# Patient Record
Sex: MALE | Race: WHITE | Employment: OTHER | URBAN - NONMETROPOLITAN AREA
[De-identification: names, ages, dates, MRNs, and addresses within clinical notes are randomized per-mention and may not be internally consistent; named-entity substitution may affect disease eponyms.]

---

## 2017-11-07 ENCOUNTER — HOSPITAL ENCOUNTER (OUTPATIENT)
Age: 77
Discharge: HOME | End: 2017-11-07
Payer: MEDICARE

## 2017-11-07 LAB
ANION GAP SERPL CALCULATED.3IONS-SCNC: 18 MEQ/L (ref 8–16)
BASOPHILS # BLD: 0.4 %
BASOPHILS ABSOLUTE: 0 THOU/MM3 (ref 0–0.1)
BUN BLDV-MCNC: 15 MG/DL (ref 7–22)
CALCIUM SERPL-MCNC: 9.4 MG/DL (ref 8.5–10.5)
CHLORIDE BLD-SCNC: 105 MEQ/L (ref 98–111)
CHOLESTEROL, TOTAL: 162 MG/DL (ref 100–199)
CO2: 22 MEQ/L (ref 23–33)
CREAT SERPL-MCNC: 0.8 MG/DL (ref 0.4–1.2)
EOSINOPHIL # BLD: 1.6 %
EOSINOPHILS ABSOLUTE: 0.1 THOU/MM3 (ref 0–0.4)
GFR SERPL CREATININE-BSD FRML MDRD: > 90 ML/MIN/1.73M2
GLUCOSE BLD-MCNC: 96 MG/DL (ref 70–108)
HCT VFR BLD CALC: 41.5 % (ref 42–52)
HDLC SERPL-MCNC: 53 MG/DL
HEMOGLOBIN: 14.4 GM/DL (ref 14–18)
LDL CHOLESTEROL CALCULATED: 81 MG/DL
LYMPHOCYTES # BLD: 24.4 %
LYMPHOCYTES ABSOLUTE: 1.8 THOU/MM3 (ref 1–4.8)
MACROCYTES: ABNORMAL
MCH RBC QN AUTO: 35.3 PG (ref 27–31)
MCHC RBC AUTO-ENTMCNC: 34.7 GM/DL (ref 33–37)
MCV RBC AUTO: 101.7 FL (ref 80–94)
MONOCYTES # BLD: 9.2 %
MONOCYTES ABSOLUTE: 0.7 THOU/MM3 (ref 0.4–1.3)
NUCLEATED RED BLOOD CELLS: 0 /100 WBC
PDW BLD-RTO: 13.3 % (ref 11.5–14.5)
PLATELET # BLD: 188 THOU/MM3 (ref 130–400)
PMV BLD AUTO: 9 MCM (ref 7.4–10.4)
POTASSIUM SERPL-SCNC: 4.4 MEQ/L (ref 3.5–5.2)
RBC # BLD: 4.08 MILL/MM3 (ref 4.7–6.1)
SEG NEUTROPHILS: 64.4 %
SEGMENTED NEUTROPHILS ABSOLUTE COUNT: 4.8 THOU/MM3 (ref 1.8–7.7)
SODIUM BLD-SCNC: 145 MEQ/L (ref 135–145)
TRIGL SERPL-MCNC: 139 MG/DL (ref 0–199)
WBC # BLD: 7.5 THOU/MM3 (ref 4.8–10.8)

## 2017-11-07 PROCEDURE — 80048 BASIC METABOLIC PNL TOTAL CA: CPT

## 2017-11-07 PROCEDURE — 80061 LIPID PANEL: CPT

## 2017-11-07 PROCEDURE — 36415 COLL VENOUS BLD VENIPUNCTURE: CPT

## 2017-11-07 PROCEDURE — 85025 COMPLETE CBC W/AUTO DIFF WBC: CPT

## 2018-03-01 ENCOUNTER — HOSPITAL ENCOUNTER (OUTPATIENT)
Age: 78
Discharge: HOME | End: 2018-03-01
Payer: MEDICARE

## 2018-03-01 LAB
ANION GAP SERPL CALCULATED.3IONS-SCNC: 14 MEQ/L (ref 8–16)
BUN BLDV-MCNC: 18 MG/DL (ref 7–22)
CALCIUM SERPL-MCNC: 10 MG/DL (ref 8.5–10.5)
CHLORIDE BLD-SCNC: 100 MEQ/L (ref 98–111)
CO2: 27 MEQ/L (ref 23–33)
CREAT SERPL-MCNC: 0.8 MG/DL (ref 0.4–1.2)
EKG ATRIAL RATE: 86 BPM
EKG P AXIS: 63 DEGREES
EKG P-R INTERVAL: 164 MS
EKG Q-T INTERVAL: 374 MS
EKG QRS DURATION: 102 MS
EKG QTC CALCULATION (BAZETT): 447 MS
EKG R AXIS: -43 DEGREES
EKG T AXIS: 0 DEGREES
EKG VENTRICULAR RATE: 86 BPM
GFR SERPL CREATININE-BSD FRML MDRD: > 90 ML/MIN/1.73M2
GLUCOSE BLD-MCNC: 89 MG/DL (ref 70–108)
HCT VFR BLD CALC: 43.6 % (ref 42–52)
HEMOGLOBIN: 15.1 GM/DL (ref 14–18)
MCH RBC QN AUTO: 35.3 PG (ref 27–31)
MCHC RBC AUTO-ENTMCNC: 34.6 GM/DL (ref 33–37)
MCV RBC AUTO: 102 FL (ref 80–94)
PDW BLD-RTO: 12.9 % (ref 11.5–14.5)
PLATELET # BLD: 193 THOU/MM3 (ref 130–400)
PMV BLD AUTO: 8.6 FL (ref 7.4–10.4)
POTASSIUM SERPL-SCNC: 4.8 MEQ/L (ref 3.5–5.2)
RBC # BLD: 4.28 MILL/MM3 (ref 4.7–6.1)
SODIUM BLD-SCNC: 141 MEQ/L (ref 135–145)
WBC # BLD: 9.3 THOU/MM3 (ref 4.8–10.8)

## 2018-03-01 PROCEDURE — 36415 COLL VENOUS BLD VENIPUNCTURE: CPT

## 2018-03-01 PROCEDURE — 80048 BASIC METABOLIC PNL TOTAL CA: CPT

## 2018-03-01 PROCEDURE — 85027 COMPLETE CBC AUTOMATED: CPT

## 2018-03-01 PROCEDURE — 93010 ELECTROCARDIOGRAM REPORT: CPT | Performed by: NUCLEAR MEDICINE

## 2018-03-01 PROCEDURE — 93005 ELECTROCARDIOGRAM TRACING: CPT | Performed by: PHYSICIAN ASSISTANT

## 2018-03-08 RX ORDER — AMLODIPINE BESYLATE 5 MG/1
5 TABLET ORAL DAILY
COMMUNITY
End: 2022-02-01 | Stop reason: SDUPTHER

## 2018-03-08 RX ORDER — LOSARTAN POTASSIUM 100 MG/1
100 TABLET ORAL DAILY
COMMUNITY
End: 2022-02-01 | Stop reason: SDUPTHER

## 2018-03-08 NOTE — PROGRESS NOTES
In preparation for their surgical procedure above patient was screened for Obstructive Sleep Apnea (JOHN) using the STOP-Bang Questionnaire by the Pre-Admission Testing department. This is a pre-surgical screening tool for patient safety and serves as a recommendation, this WILL NOT cause cancellation of surgery. STOP-Bang Questionnaire  * Do you currently see a pulmonologist?  No     If yes STOP, do not complete. Patient follows with Dr. Magaly Perez (Optional):[id number]:  1. Do you snore loudly (able to be heard in the next room)? No    2. Do you often feel tired or sleepy during the daytime? No       3. Has anyone ever told you that you stop breathing during your sleep? No    4. Do you have or are you being treated for high blood pressure? Yes      5. BMI more than 35? BMI (Calculated): 26.9        No    6. Age over 48 years? 65 y.o. Yes    7. Neck Circumference greater than 17 inches for male or 16 inches for female? Measured           (visits only)            Not Applicable    8. Gender Male? Yes      TOTAL SCORE: 3    JOHN - Low Risk : Yes to 0 - 2 questions  JOHN - Intermediate Risk : Yes to 3 - 4 questions  JOHN - High Risk : Yes to 5 - 8 questions    Adapted from:   STOP Questionnaire: A Tool to Screen Patients for Obstructive Sleep Apnea   YINA Fink.C.P.C., Anthony Pitt M.B.B.S., Emil Dancer, M.D., Martha Daly. Teresita Saucedo, Ph.D., Windy Merino M.B.B.S., Garry Fitzgerald, M.Sc., Best Mccabe M.D., Christina Yañez. REUBEN McculloughC.P.C.    Anesthesiology 2008; 190:660-55 Copyright 2008, the [address] [employer], [address].   ----------------------------------------------------------------------------------------------------------------

## 2018-03-08 NOTE — PROGRESS NOTES
Patient instructed not to eat or drink anything after midnight the day before surgery. Take heart & blood pressure medications in the morning with a small sip of water. Please bring list of medications with the dosages & when you take them. If you do not  have a list bring the medications bottles with you. If having a MAC or general anesthetic you MUST have a . Bring photo ID & insurance information. Leave jewelry (watch ,rings, peircings) & other valuables, including extra cash, at home. Wear comfortable clean clothing. When showering or bathing the night before or morning of surgery please use  antibacterial soap.

## 2018-03-16 ENCOUNTER — ANESTHESIA EVENT (OUTPATIENT)
Dept: OPERATING ROOM | Age: 78
End: 2018-03-16
Payer: MEDICARE

## 2018-03-16 ENCOUNTER — ANESTHESIA (OUTPATIENT)
Dept: OPERATING ROOM | Age: 78
End: 2018-03-16
Payer: MEDICARE

## 2018-03-16 ENCOUNTER — HOSPITAL ENCOUNTER (OUTPATIENT)
Age: 78
Setting detail: OUTPATIENT SURGERY
Discharge: HOME | End: 2018-03-16
Attending: SPECIALIST | Admitting: SPECIALIST
Payer: MEDICARE

## 2018-03-16 VITALS
DIASTOLIC BLOOD PRESSURE: 69 MMHG | OXYGEN SATURATION: 97 % | RESPIRATION RATE: 16 BRPM | HEIGHT: 70 IN | HEART RATE: 60 BPM | TEMPERATURE: 97.6 F | BODY MASS INDEX: 28.09 KG/M2 | WEIGHT: 196.2 LBS | SYSTOLIC BLOOD PRESSURE: 148 MMHG

## 2018-03-16 VITALS
DIASTOLIC BLOOD PRESSURE: 87 MMHG | SYSTOLIC BLOOD PRESSURE: 138 MMHG | OXYGEN SATURATION: 99 % | RESPIRATION RATE: 3 BRPM

## 2018-03-16 PROCEDURE — 7100000000 HC PACU RECOVERY - FIRST 15 MIN: Performed by: SPECIALIST

## 2018-03-16 PROCEDURE — 6360000002 HC RX W HCPCS: Performed by: SPECIALIST

## 2018-03-16 PROCEDURE — 6360000002 HC RX W HCPCS

## 2018-03-16 PROCEDURE — 7100000011 HC PHASE II RECOVERY - ADDTL 15 MIN: Performed by: SPECIALIST

## 2018-03-16 PROCEDURE — 3700000000 HC ANESTHESIA ATTENDED CARE: Performed by: SPECIALIST

## 2018-03-16 PROCEDURE — 3700000001 HC ADD 15 MINUTES (ANESTHESIA): Performed by: SPECIALIST

## 2018-03-16 PROCEDURE — 6360000002 HC RX W HCPCS: Performed by: ANESTHESIOLOGY

## 2018-03-16 PROCEDURE — 88305 TISSUE EXAM BY PATHOLOGIST: CPT

## 2018-03-16 PROCEDURE — 2580000003 HC RX 258: Performed by: SPECIALIST

## 2018-03-16 PROCEDURE — 3600000012 HC SURGERY LEVEL 2 ADDTL 15MIN: Performed by: SPECIALIST

## 2018-03-16 PROCEDURE — 7100000001 HC PACU RECOVERY - ADDTL 15 MIN: Performed by: SPECIALIST

## 2018-03-16 PROCEDURE — 88311 DECALCIFY TISSUE: CPT

## 2018-03-16 PROCEDURE — 3600000002 HC SURGERY LEVEL 2 BASE: Performed by: SPECIALIST

## 2018-03-16 PROCEDURE — 7100000010 HC PHASE II RECOVERY - FIRST 15 MIN: Performed by: SPECIALIST

## 2018-03-16 RX ORDER — MIDAZOLAM HYDROCHLORIDE 1 MG/ML
INJECTION INTRAMUSCULAR; INTRAVENOUS PRN
Status: DISCONTINUED | OUTPATIENT
Start: 2018-03-16 | End: 2018-03-16 | Stop reason: SDUPTHER

## 2018-03-16 RX ORDER — PROPOFOL 10 MG/ML
INJECTION, EMULSION INTRAVENOUS PRN
Status: DISCONTINUED | OUTPATIENT
Start: 2018-03-16 | End: 2018-03-16 | Stop reason: SDUPTHER

## 2018-03-16 RX ORDER — FENTANYL CITRATE 50 UG/ML
INJECTION, SOLUTION INTRAMUSCULAR; INTRAVENOUS PRN
Status: DISCONTINUED | OUTPATIENT
Start: 2018-03-16 | End: 2018-03-16 | Stop reason: SDUPTHER

## 2018-03-16 RX ORDER — SODIUM CHLORIDE 9 MG/ML
INJECTION, SOLUTION INTRAVENOUS CONTINUOUS
Status: DISCONTINUED | OUTPATIENT
Start: 2018-03-16 | End: 2018-03-16 | Stop reason: HOSPADM

## 2018-03-16 RX ADMIN — CEFAZOLIN SODIUM 1 G: 1 INJECTION, SOLUTION INTRAVENOUS at 11:08

## 2018-03-16 RX ADMIN — PROPOFOL 110 MG: 10 INJECTION, EMULSION INTRAVENOUS at 11:04

## 2018-03-16 RX ADMIN — FENTANYL CITRATE 100 MCG: 50 INJECTION INTRAMUSCULAR; INTRAVENOUS at 11:04

## 2018-03-16 RX ADMIN — MIDAZOLAM HYDROCHLORIDE 2 MG: 1 INJECTION, SOLUTION INTRAMUSCULAR; INTRAVENOUS at 11:04

## 2018-03-16 RX ADMIN — SODIUM CHLORIDE: 9 INJECTION, SOLUTION INTRAVENOUS at 11:02

## 2018-03-16 ASSESSMENT — PULMONARY FUNCTION TESTS
PIF_VALUE: 13
PIF_VALUE: 14
PIF_VALUE: 9
PIF_VALUE: 40
PIF_VALUE: 14
PIF_VALUE: 13
PIF_VALUE: 0
PIF_VALUE: 10
PIF_VALUE: 13
PIF_VALUE: 14
PIF_VALUE: 15
PIF_VALUE: 14
PIF_VALUE: 0
PIF_VALUE: 15
PIF_VALUE: 14
PIF_VALUE: 15
PIF_VALUE: 0
PIF_VALUE: 15
PIF_VALUE: 41
PIF_VALUE: 0
PIF_VALUE: 13
PIF_VALUE: 1
PIF_VALUE: 22
PIF_VALUE: 14
PIF_VALUE: 13
PIF_VALUE: 16
PIF_VALUE: 15
PIF_VALUE: 15
PIF_VALUE: 0
PIF_VALUE: 13
PIF_VALUE: 14
PIF_VALUE: 15
PIF_VALUE: 0
PIF_VALUE: 14
PIF_VALUE: 29
PIF_VALUE: 13
PIF_VALUE: 0
PIF_VALUE: 14
PIF_VALUE: 11
PIF_VALUE: 14
PIF_VALUE: 14
PIF_VALUE: 27
PIF_VALUE: 14
PIF_VALUE: 24
PIF_VALUE: 15
PIF_VALUE: 14
PIF_VALUE: 9
PIF_VALUE: 18
PIF_VALUE: 21
PIF_VALUE: 14
PIF_VALUE: 10
PIF_VALUE: 15
PIF_VALUE: 15
PIF_VALUE: 33

## 2018-03-16 ASSESSMENT — PAIN SCALES - WONG BAKER: WONGBAKER_NUMERICALRESPONSE: 0

## 2018-03-16 ASSESSMENT — PAIN - FUNCTIONAL ASSESSMENT: PAIN_FUNCTIONAL_ASSESSMENT: 0-10

## 2018-03-16 ASSESSMENT — PAIN DESCRIPTION - DESCRIPTORS: DESCRIPTORS: ACHING

## 2018-03-16 NOTE — PROGRESS NOTES
1200-Patient arrived to PACU bay 1 via cart. Report received from [facility] and Dr. Gabriel Mehta. Patient with LMA in place. Vitals obtained and stable with respirations even and unlabored on room air. Dressing in place on scalp with chin wrap. No drainage noted. 1205-Patient opens eyes on command. LMA removed with no issues. Patient verbalized no pain or nausea at this time. Respirations remain even and unlabored on room air. Adriana ALVAREZ to bedside to assess tightness of dressing. Patient denies it being to tight. 1210-Patient awake and alert. Drowsy but answers questions appropriately. 1215-Patient remains awake and alert. Denies needs at this time. No complaints of pain or nausea. 1220-Patient vitals remains stable with respirations even and unlabored on room air. Resting with eyes closed. Does awake to verbal command. 1225-Patient with stable vital signs. Continues to deny pain and nausea at this time. 1230-Patient meets criteria to move to Phase II.   1235-Patient provided with snack and drink. Family brought to bedside. Denies needs at this time. Call light in reach. 1245-Patient resting in bed with family at bedside. Patient denies needs at this time. 1300-Patient meets criteria for discharge. IV removed with no complications. Patient getting dressed with family at bedside. 1315-Discharge instructions reviewed. Verbalized understanding. Patient ambulated to bathroom without difficulty.    1320-Patient discharged in stable condition with family

## 2018-03-16 NOTE — H&P
[address] [address]  History and Physical Update    Pt Name: Delmer Lopez  MRN: [id number]  YOB: 1940  Date of evaluation: 3/16/2018    I have examined the patient and reviewed the H&P/Consult and there are no changes to the patient or plans.       Julee Number  Electronically signed 3/16/2018 at 6:46 AM

## 2018-03-16 NOTE — OP NOTE
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 Lilibeth Pedraza PA-C    Estimated Blood Loss: 5ml     Complications: none immediately appreciated    Procedure: With the patient lying in the supine position and under adequate anesthesia per the anesthesia team.   The positive deep margin periosteum and thin layer of cortical bone was excised (using scalpel and chisel) widely at 4cm and sent to pathology for permanent pathologic evaluation. A Bacitracin adaptic and Xeroform and moist cotton ball tie over bolster was secured using the tails of the silk sutures. Final dressings was a head wrap. Patient is to return to office next week to review pathology report and hopefully his closure will occur next. The patient tolerated the procedure well and remained hemodynamically stable throughout the procedure and was quite comfortable throughout the operative course.     Clinical staging for cancer cases:  Ct  Theresa Villaseñor  Electronically signed by me on 3/16/2018 at 2:14 PM

## 2018-03-16 NOTE — ANESTHESIA PRE PROCEDURE
Encounters:   03/16/18 (!) 171/80       NPO Status: Time of last liquid consumption: 0800 (sip with med)                        Time of last solid consumption: 2330                        Date of last liquid consumption: 03/16/18                        Date of last solid food consumption: 03/15/18    BMI:   Wt Readings from Last 3 Encounters:   03/16/18 196 lb 3.2 oz (89 kg)     Body mass index is 28.15 kg/m². CBC:   Lab Results   Component Value Date    WBC 9.3 03/01/2018    RBC 4.28 03/01/2018    HGB 15.1 03/01/2018    HCT 43.6 03/01/2018    .0 03/01/2018    RDW 12.9 03/01/2018     03/01/2018       CMP:   Lab Results   Component Value Date     03/01/2018    K 4.8 03/01/2018     03/01/2018    CO2 27 03/01/2018    BUN 18 03/01/2018    CREATININE 0.8 03/01/2018    LABGLOM >90 03/01/2018    GLUCOSE 89 03/01/2018    PROT 7.1 09/27/2013    CALCIUM 10.0 03/01/2018    BILITOT 0.7 09/27/2013    ALKPHOS 72 09/27/2013    AST 23 09/27/2013    ALT 13 09/27/2013       POC Tests: No results for input(s): POCGLU, POCNA, POCK, POCCL, POCBUN, POCHEMO, POCHCT in the last 72 hours. Coags: No results found for: PROTIME, INR, APTT    HCG (If Applicable): No results found for: PREGTESTUR, PREGSERUM, HCG, HCGQUANT     ABGs: No results found for: PHART, PO2ART, SCT8LJF, GYM3IKY, BEART, A6NUXTZH     Type & Screen (If Applicable):  No results found for: LABABO, LABRH    Anesthesia Evaluation    Airway: Mallampati: II       Mouth opening: > = 3 FB Dental:          Pulmonary:       (-) COPD                           Cardiovascular:    (+) hypertension:,     (-) CAD      Rhythm: regular                      Neuro/Psych:               GI/Hepatic/Renal:             Endo/Other:        (-) diabetes mellitus               Abdominal:           Vascular:                                        Anesthesia Plan      general     ASA 2       Induction: intravenous.       Anesthetic plan and risks discussed with

## 2018-03-21 ENCOUNTER — ANESTHESIA (OUTPATIENT)
Dept: OPERATING ROOM | Age: 78
DRG: 578 | End: 2018-03-21
Payer: MEDICARE

## 2018-03-21 ENCOUNTER — HOSPITAL ENCOUNTER (INPATIENT)
Age: 78
LOS: 1 days | Discharge: HOME | DRG: 578 | End: 2018-03-22
Attending: SPECIALIST | Admitting: SPECIALIST
Payer: MEDICARE

## 2018-03-21 ENCOUNTER — ANESTHESIA EVENT (OUTPATIENT)
Dept: OPERATING ROOM | Age: 78
DRG: 578 | End: 2018-03-21
Payer: MEDICARE

## 2018-03-21 VITALS
OXYGEN SATURATION: 98 % | TEMPERATURE: 98.6 F | DIASTOLIC BLOOD PRESSURE: 59 MMHG | SYSTOLIC BLOOD PRESSURE: 115 MMHG | RESPIRATION RATE: 19 BRPM

## 2018-03-21 PROBLEM — C44.42 SQUAMOUS CELL CANCER OF SKIN OF CROWN: Status: ACTIVE | Noted: 2018-03-21

## 2018-03-21 PROCEDURE — 7100000000 HC PACU RECOVERY - FIRST 15 MIN: Performed by: SPECIALIST

## 2018-03-21 PROCEDURE — 2500000003 HC RX 250 WO HCPCS: Performed by: SPECIALIST

## 2018-03-21 PROCEDURE — 3600000012 HC SURGERY LEVEL 2 ADDTL 15MIN: Performed by: SPECIALIST

## 2018-03-21 PROCEDURE — 6360000002 HC RX W HCPCS: Performed by: PHYSICIAN ASSISTANT

## 2018-03-21 PROCEDURE — 3700000001 HC ADD 15 MINUTES (ANESTHESIA): Performed by: SPECIALIST

## 2018-03-21 PROCEDURE — 6360000002 HC RX W HCPCS: Performed by: NURSE ANESTHETIST, CERTIFIED REGISTERED

## 2018-03-21 PROCEDURE — 1200000000 HC SEMI PRIVATE

## 2018-03-21 PROCEDURE — 6360000002 HC RX W HCPCS

## 2018-03-21 PROCEDURE — 3700000000 HC ANESTHESIA ATTENDED CARE: Performed by: SPECIALIST

## 2018-03-21 PROCEDURE — 2580000003 HC RX 258: Performed by: SPECIALIST

## 2018-03-21 PROCEDURE — 7100000001 HC PACU RECOVERY - ADDTL 15 MIN: Performed by: SPECIALIST

## 2018-03-21 PROCEDURE — 6360000002 HC RX W HCPCS: Performed by: SPECIALIST

## 2018-03-21 PROCEDURE — 2500000003 HC RX 250 WO HCPCS

## 2018-03-21 PROCEDURE — 3600000002 HC SURGERY LEVEL 2 BASE: Performed by: SPECIALIST

## 2018-03-21 PROCEDURE — 7100000011 HC PHASE II RECOVERY - ADDTL 15 MIN: Performed by: SPECIALIST

## 2018-03-21 PROCEDURE — 2500000003 HC RX 250 WO HCPCS: Performed by: NURSE ANESTHETIST, CERTIFIED REGISTERED

## 2018-03-21 PROCEDURE — 6370000000 HC RX 637 (ALT 250 FOR IP): Performed by: PHYSICIAN ASSISTANT

## 2018-03-21 PROCEDURE — 7100000010 HC PHASE II RECOVERY - FIRST 15 MIN: Performed by: SPECIALIST

## 2018-03-21 RX ORDER — EPHEDRINE SULFATE 50 MG/ML
INJECTION INTRAVENOUS PRN
Status: DISCONTINUED | OUTPATIENT
Start: 2018-03-21 | End: 2018-03-21 | Stop reason: SDUPTHER

## 2018-03-21 RX ORDER — HYDROCODONE BITARTRATE AND ACETAMINOPHEN 5; 325 MG/1; MG/1
1 TABLET ORAL EVERY 4 HOURS PRN
Status: DISCONTINUED | OUTPATIENT
Start: 2018-03-21 | End: 2018-03-22 | Stop reason: HOSPADM

## 2018-03-21 RX ORDER — NEOSTIGMINE METHYLSULFATE 1 MG/ML
INJECTION, SOLUTION INTRAVENOUS PRN
Status: DISCONTINUED | OUTPATIENT
Start: 2018-03-21 | End: 2018-03-21 | Stop reason: SDUPTHER

## 2018-03-21 RX ORDER — PROPOFOL 10 MG/ML
INJECTION, EMULSION INTRAVENOUS PRN
Status: DISCONTINUED | OUTPATIENT
Start: 2018-03-21 | End: 2018-03-21 | Stop reason: SDUPTHER

## 2018-03-21 RX ORDER — SODIUM CHLORIDE 9 MG/ML
INJECTION, SOLUTION INTRAVENOUS CONTINUOUS
Status: DISCONTINUED | OUTPATIENT
Start: 2018-03-21 | End: 2018-03-22 | Stop reason: HOSPADM

## 2018-03-21 RX ORDER — SUCCINYLCHOLINE CHLORIDE 20 MG/ML
INJECTION INTRAMUSCULAR; INTRAVENOUS PRN
Status: DISCONTINUED | OUTPATIENT
Start: 2018-03-21 | End: 2018-03-21 | Stop reason: SDUPTHER

## 2018-03-21 RX ORDER — IBUPROFEN 200 MG
200 TABLET ORAL EVERY 8 HOURS PRN
Status: ON HOLD | COMMUNITY
End: 2018-03-22 | Stop reason: HOSPADM

## 2018-03-21 RX ORDER — FENTANYL CITRATE 50 UG/ML
INJECTION, SOLUTION INTRAMUSCULAR; INTRAVENOUS PRN
Status: DISCONTINUED | OUTPATIENT
Start: 2018-03-21 | End: 2018-03-21 | Stop reason: SDUPTHER

## 2018-03-21 RX ORDER — ONDANSETRON 2 MG/ML
INJECTION INTRAMUSCULAR; INTRAVENOUS PRN
Status: DISCONTINUED | OUTPATIENT
Start: 2018-03-21 | End: 2018-03-21 | Stop reason: SDUPTHER

## 2018-03-21 RX ORDER — GLYCOPYRROLATE 1 MG/5 ML
SYRINGE (ML) INTRAVENOUS PRN
Status: DISCONTINUED | OUTPATIENT
Start: 2018-03-21 | End: 2018-03-21 | Stop reason: SDUPTHER

## 2018-03-21 RX ORDER — ACETAMINOPHEN 325 MG/1
650 TABLET ORAL EVERY 6 HOURS PRN
COMMUNITY
End: 2018-04-10

## 2018-03-21 RX ORDER — HYDROCODONE BITARTRATE AND ACETAMINOPHEN 5; 325 MG/1; MG/1
2 TABLET ORAL EVERY 4 HOURS PRN
Status: DISCONTINUED | OUTPATIENT
Start: 2018-03-21 | End: 2018-03-22 | Stop reason: HOSPADM

## 2018-03-21 RX ORDER — ROCURONIUM BROMIDE 10 MG/ML
INJECTION, SOLUTION INTRAVENOUS PRN
Status: DISCONTINUED | OUTPATIENT
Start: 2018-03-21 | End: 2018-03-21 | Stop reason: SDUPTHER

## 2018-03-21 RX ORDER — LIDOCAINE HYDROCHLORIDE AND EPINEPHRINE 10; 10 MG/ML; UG/ML
INJECTION, SOLUTION INFILTRATION; PERINEURAL PRN
Status: DISCONTINUED | OUTPATIENT
Start: 2018-03-21 | End: 2018-03-22 | Stop reason: HOSPADM

## 2018-03-21 RX ORDER — DEXAMETHASONE SODIUM PHOSPHATE 4 MG/ML
INJECTION, SOLUTION INTRA-ARTICULAR; INTRALESIONAL; INTRAMUSCULAR; INTRAVENOUS; SOFT TISSUE PRN
Status: DISCONTINUED | OUTPATIENT
Start: 2018-03-21 | End: 2018-03-21 | Stop reason: SDUPTHER

## 2018-03-21 RX ADMIN — Medication 10 MG: at 15:50

## 2018-03-21 RX ADMIN — PHENYLEPHRINE HYDROCHLORIDE 100 MCG: 10 INJECTION INTRAVENOUS at 15:25

## 2018-03-21 RX ADMIN — EPHEDRINE SULFATE 10 MG: 50 INJECTION, SOLUTION INTRAVENOUS at 15:13

## 2018-03-21 RX ADMIN — PHENYLEPHRINE HYDROCHLORIDE 200 MCG: 10 INJECTION INTRAVENOUS at 15:00

## 2018-03-21 RX ADMIN — CEFAZOLIN SODIUM 1 G: 1 INJECTION, SOLUTION INTRAVENOUS at 23:11

## 2018-03-21 RX ADMIN — FENTANYL CITRATE 100 MCG: 50 INJECTION INTRAMUSCULAR; INTRAVENOUS at 14:45

## 2018-03-21 RX ADMIN — EPHEDRINE SULFATE 10 MG: 50 INJECTION, SOLUTION INTRAVENOUS at 15:25

## 2018-03-21 RX ADMIN — ONDANSETRON 4 MG: 2 INJECTION INTRAMUSCULAR; INTRAVENOUS at 14:49

## 2018-03-21 RX ADMIN — PHENYLEPHRINE HYDROCHLORIDE 200 MCG: 10 INJECTION INTRAVENOUS at 14:57

## 2018-03-21 RX ADMIN — HYDROCODONE BITARTRATE AND ACETAMINOPHEN 1 TABLET: 5; 325 TABLET ORAL at 17:59

## 2018-03-21 RX ADMIN — DEXAMETHASONE SODIUM PHOSPHATE 8 MG: 4 INJECTION, SOLUTION INTRAMUSCULAR; INTRAVENOUS at 14:49

## 2018-03-21 RX ADMIN — PROPOFOL 140 MG: 10 INJECTION, EMULSION INTRAVENOUS at 14:45

## 2018-03-21 RX ADMIN — CEFAZOLIN SODIUM 1 G: 1 INJECTION, SOLUTION INTRAVENOUS at 14:49

## 2018-03-21 RX ADMIN — HYDROMORPHONE HYDROCHLORIDE 0.5 MG: 1 INJECTION, SOLUTION INTRAMUSCULAR; INTRAVENOUS; SUBCUTANEOUS at 17:38

## 2018-03-21 RX ADMIN — NEOSTIGMINE METHYLSULFATE 3 MG: 1 INJECTION, SOLUTION INTRAVENOUS at 17:00

## 2018-03-21 RX ADMIN — SUCCINYLCHOLINE CHLORIDE 160 MG: 20 INJECTION, SOLUTION INTRAMUSCULAR; INTRAVENOUS at 14:46

## 2018-03-21 RX ADMIN — Medication 0.4 MG: at 17:00

## 2018-03-21 RX ADMIN — EPHEDRINE SULFATE 10 MG: 50 INJECTION, SOLUTION INTRAVENOUS at 15:00

## 2018-03-21 RX ADMIN — Medication 0.2 MG: at 15:00

## 2018-03-21 RX ADMIN — SODIUM CHLORIDE: 9 INJECTION, SOLUTION INTRAVENOUS at 14:45

## 2018-03-21 RX ADMIN — Medication 25 MG: at 15:02

## 2018-03-21 RX ADMIN — EPHEDRINE SULFATE 10 MG: 50 INJECTION, SOLUTION INTRAVENOUS at 16:35

## 2018-03-21 ASSESSMENT — PULMONARY FUNCTION TESTS
PIF_VALUE: 18
PIF_VALUE: 18
PIF_VALUE: 17
PIF_VALUE: 22
PIF_VALUE: 0
PIF_VALUE: 19
PIF_VALUE: 16
PIF_VALUE: 17
PIF_VALUE: 19
PIF_VALUE: 18
PIF_VALUE: 19
PIF_VALUE: 17
PIF_VALUE: 16
PIF_VALUE: 17
PIF_VALUE: 18
PIF_VALUE: 17
PIF_VALUE: 18
PIF_VALUE: 4
PIF_VALUE: 18
PIF_VALUE: 15
PIF_VALUE: 18
PIF_VALUE: 3
PIF_VALUE: 17
PIF_VALUE: 18
PIF_VALUE: 17
PIF_VALUE: 18
PIF_VALUE: 17
PIF_VALUE: 18
PIF_VALUE: 16
PIF_VALUE: 19
PIF_VALUE: 17
PIF_VALUE: 19
PIF_VALUE: 17
PIF_VALUE: 19
PIF_VALUE: 19
PIF_VALUE: 20
PIF_VALUE: 17
PIF_VALUE: 18
PIF_VALUE: 18
PIF_VALUE: 17
PIF_VALUE: 43
PIF_VALUE: 17
PIF_VALUE: 19
PIF_VALUE: 18
PIF_VALUE: 17
PIF_VALUE: 18
PIF_VALUE: 19
PIF_VALUE: 17
PIF_VALUE: 19
PIF_VALUE: 17
PIF_VALUE: 18
PIF_VALUE: 18
PIF_VALUE: 19
PIF_VALUE: 16
PIF_VALUE: 18
PIF_VALUE: 17
PIF_VALUE: 17
PIF_VALUE: 18
PIF_VALUE: 17
PIF_VALUE: 15
PIF_VALUE: 19
PIF_VALUE: 18
PIF_VALUE: 17
PIF_VALUE: 16
PIF_VALUE: 18
PIF_VALUE: 19
PIF_VALUE: 19
PIF_VALUE: 18
PIF_VALUE: 19
PIF_VALUE: 19
PIF_VALUE: 14
PIF_VALUE: 18
PIF_VALUE: 18
PIF_VALUE: 16
PIF_VALUE: 17
PIF_VALUE: 18
PIF_VALUE: 19
PIF_VALUE: 19
PIF_VALUE: 17
PIF_VALUE: 17
PIF_VALUE: 20
PIF_VALUE: 18
PIF_VALUE: 19
PIF_VALUE: 18
PIF_VALUE: 19
PIF_VALUE: 17
PIF_VALUE: 0
PIF_VALUE: 17
PIF_VALUE: 18
PIF_VALUE: 19
PIF_VALUE: 19
PIF_VALUE: 17
PIF_VALUE: 18
PIF_VALUE: 24
PIF_VALUE: 19
PIF_VALUE: 17
PIF_VALUE: 18
PIF_VALUE: 19
PIF_VALUE: 18
PIF_VALUE: 19
PIF_VALUE: 20
PIF_VALUE: 19
PIF_VALUE: 17
PIF_VALUE: 18
PIF_VALUE: 19
PIF_VALUE: 19
PIF_VALUE: 17
PIF_VALUE: 18
PIF_VALUE: 3
PIF_VALUE: 19
PIF_VALUE: 18
PIF_VALUE: 18
PIF_VALUE: 17
PIF_VALUE: 18
PIF_VALUE: 18
PIF_VALUE: 19
PIF_VALUE: 19
PIF_VALUE: 18
PIF_VALUE: 17
PIF_VALUE: 19
PIF_VALUE: 18
PIF_VALUE: 19
PIF_VALUE: 18
PIF_VALUE: 8
PIF_VALUE: 17
PIF_VALUE: 18

## 2018-03-21 ASSESSMENT — PAIN SCALES - GENERAL
PAINLEVEL_OUTOF10: 0
PAINLEVEL_OUTOF10: 7
PAINLEVEL_OUTOF10: 3
PAINLEVEL_OUTOF10: 5
PAINLEVEL_OUTOF10: 3

## 2018-03-21 ASSESSMENT — PAIN - FUNCTIONAL ASSESSMENT: PAIN_FUNCTIONAL_ASSESSMENT: 0-10

## 2018-03-21 ASSESSMENT — PAIN DESCRIPTION - DESCRIPTORS: DESCRIPTORS: ACHING

## 2018-03-21 ASSESSMENT — PAIN DESCRIPTION - LOCATION: LOCATION: HEAD

## 2018-03-21 ASSESSMENT — PAIN DESCRIPTION - PAIN TYPE: TYPE: ACUTE PAIN;SURGICAL PAIN

## 2018-03-21 NOTE — ANESTHESIA POSTPROCEDURE EVALUATION
Department of Anesthesiology  Postprocedure Note    Patient: Mat Sommers  MRN: [id number]  YOB: 1940  Date of evaluation: 3/21/2018  Time:  5:37 PM     Procedure Summary     Date:  03/21/18 Room / Location:  [facility] OR [address] [place] [address]    Anesthesia Start:  9890 Anesthesia Stop:  8891    Procedure:  MOHS REPAIR FOR SQUAMOUS CELL CARCINOMA OF APEX OF SCALP (N/A Face) Diagnosis:  (SQUAMOUS CELL CARCINOMA OF APEX OF SCALP)    Surgeon:  Terra Trevino MD Responsible Provider:  Alysa Oropeza MD    Anesthesia Type:  MAC ASA Status:  2          Anesthesia Type: MAC    Jose Phase I: Jose Score: 8    Jose Phase II:      Last vitals: Reviewed and per EMR flowsheets. Anesthesia Post Evaluation    Patient location during evaluation: PACU  Patient participation: complete - patient participated  Level of consciousness: awake and alert  Airway patency: patent  Nausea & Vomiting: no nausea and no vomiting  Complications: no  Cardiovascular status: hemodynamically stable  Respiratory status: acceptable  Hydration status: euvolemic      [facility] [facility]  POST-ANESTHESIA NOTE       Name:  Mat Sommers                                         Age:  65 y.o.   MRN:  [id number]      Last Vitals:  /72   Pulse 84   Temp 97 °F (36.1 °C) (Temporal)   Resp 10   Ht 5' 10\" (1.778 m)   Wt 195 lb (88.5 kg)   SpO2 98%   BMI 27.98 kg/m²   Patient Vitals for the past 4 hrs:   BP Temp Temp src Pulse Resp SpO2   03/21/18 1730 135/72 - - 84 10 98 %   03/21/18 1725 134/68 - - 84 16 98 %   03/21/18 1720 133/67 - - 83 13 99 %   03/21/18 1715 139/68 - - 91 16 98 %   03/21/18 1709 (!) 146/70 97 °F (36.1 °C) Temporal 95 17 92 %       Level of Consciousness:  Awake    Respiratory:  Stable    Oxygen Saturation:  Stable    Cardiovascular:  Stable    Hydration:  Adequate    PONV:  Stable    Post-op Pain:  Adequate analgesia    Post-op Assessment:  No apparent anesthetic complications    Additional

## 2018-03-21 NOTE — ANESTHESIA PRE PROCEDURE
Department of Anesthesiology  Preprocedure Note       Name:  Camillo Crigler   Age:  65 y.o.  :  1940                                          MRN:  [id number]         Date:  3/21/2018      Surgeon: Deloris Ho):  Travis Mackey MD    Procedure: Procedure(s):  MOHS REPAIR FOR SQUAMOUS CELL CARCINOMA OF APEX OF SCALP    Medications prior to admission:   Prior to Admission medications    Medication Sig Start Date End Date Taking? Authorizing Provider   amLODIPine (NORVASC) 5 MG tablet Take 5 mg by mouth daily   Yes Historical Provider, MD   losartan (COZAAR) 100 MG tablet Take 100 mg by mouth daily   Yes Historical Provider, MD       Current medications:    Current Facility-Administered Medications   Medication Dose Route Frequency Provider Last Rate Last Dose    ceFAZolin (ANCEF) 1 g in dextrose 5 % 50 mL IVPB (premix)  1 g Intravenous On Call to Nakita Gilmore MD        0.9 % sodium chloride infusion   Intravenous Continuous Travis Mackey MD           Allergies:  No Known Allergies    Problem List:  There is no problem list on file for this patient.       Past Medical History:        Diagnosis Date    Arthritis     Cancer [facility])     kidney, right - surgery & radiation    Cancer ([facility] [address]     skin    Hypertension        Past Surgical History:        Procedure Laterality Date    COLONOSCOPY      last one     KIDNEY REMOVAL      right    MOHS SURGERY  2018    MOHS repair SCC at apex of scalp    [place] OFFICE/OUTPT VISIT,PROCEDURE ONLY N/A 3/16/2018    SCC OF APEX OF SCALP radical removal of bone performed by Travis Mackey MD at [facility]         Social History:    Social History   Substance Use Topics    Smoking status: Never Smoker    Smokeless tobacco: Never Used    Alcohol use Yes      Comment: social                                Counseling given: Not Answered      Vital Signs (Current):   Vitals:    18 1330   BP: (!) 171/79   Pulse: 76

## 2018-03-22 VITALS
TEMPERATURE: 98.9 F | OXYGEN SATURATION: 94 % | HEIGHT: 70 IN | BODY MASS INDEX: 27.92 KG/M2 | WEIGHT: 195 LBS | SYSTOLIC BLOOD PRESSURE: 130 MMHG | RESPIRATION RATE: 18 BRPM | HEART RATE: 88 BPM | DIASTOLIC BLOOD PRESSURE: 63 MMHG

## 2018-03-22 LAB
ANION GAP SERPL CALCULATED.3IONS-SCNC: 15 MEQ/L (ref 8–16)
BASOPHILS # BLD: 0.4 %
BASOPHILS ABSOLUTE: 0.1 THOU/MM3 (ref 0–0.1)
BUN BLDV-MCNC: 17 MG/DL (ref 7–22)
CALCIUM IONIZED: 1.1 MMOL/L (ref 1.12–1.32)
CALCIUM SERPL-MCNC: 8.6 MG/DL (ref 8.5–10.5)
CHLORIDE BLD-SCNC: 97 MEQ/L (ref 98–111)
CO2: 21 MEQ/L (ref 23–33)
CREAT SERPL-MCNC: 0.8 MG/DL (ref 0.4–1.2)
EOSINOPHIL # BLD: 0 %
EOSINOPHILS ABSOLUTE: 0 THOU/MM3 (ref 0–0.4)
GFR SERPL CREATININE-BSD FRML MDRD: > 90 ML/MIN/1.73M2
GLUCOSE BLD-MCNC: 152 MG/DL (ref 70–108)
HCT VFR BLD CALC: 33.3 % (ref 42–52)
HEMOGLOBIN: 11.4 GM/DL (ref 14–18)
LYMPHOCYTES # BLD: 5 %
LYMPHOCYTES ABSOLUTE: 0.8 THOU/MM3 (ref 1–4.8)
MACROCYTES: ABNORMAL
MCH RBC QN AUTO: 34.7 PG (ref 27–31)
MCHC RBC AUTO-ENTMCNC: 34.3 GM/DL (ref 33–37)
MCV RBC AUTO: 101.3 FL (ref 80–94)
MONOCYTES # BLD: 7.3 %
MONOCYTES ABSOLUTE: 1.2 THOU/MM3 (ref 0.4–1.3)
NUCLEATED RED BLOOD CELLS: 0 /100 WBC
PDW BLD-RTO: 12.7 % (ref 11.5–14.5)
PLATELET # BLD: 194 THOU/MM3 (ref 130–400)
PMV BLD AUTO: 7.8 FL (ref 7.4–10.4)
POTASSIUM SERPL-SCNC: 4.6 MEQ/L (ref 3.5–5.2)
RBC # BLD: 3.29 MILL/MM3 (ref 4.7–6.1)
SEG NEUTROPHILS: 87.3 %
SEGMENTED NEUTROPHILS ABSOLUTE COUNT: 14.8 THOU/MM3 (ref 1.8–7.7)
SODIUM BLD-SCNC: 133 MEQ/L (ref 135–145)
WBC # BLD: 16.9 THOU/MM3 (ref 4.8–10.8)

## 2018-03-22 PROCEDURE — 85025 COMPLETE CBC W/AUTO DIFF WBC: CPT

## 2018-03-22 PROCEDURE — 80048 BASIC METABOLIC PNL TOTAL CA: CPT

## 2018-03-22 PROCEDURE — 82330 ASSAY OF CALCIUM: CPT

## 2018-03-22 PROCEDURE — 6360000002 HC RX W HCPCS: Performed by: PHYSICIAN ASSISTANT

## 2018-03-22 PROCEDURE — 0JX00ZC TRANSFER SCALP SUBCUTANEOUS TISSUE AND FASCIA WITH SKIN, SUBCUTANEOUS TISSUE AND FASCIA, OPEN APPROACH: ICD-10-PCS | Performed by: INTERNAL MEDICINE

## 2018-03-22 PROCEDURE — 36415 COLL VENOUS BLD VENIPUNCTURE: CPT

## 2018-03-22 RX ADMIN — CEFAZOLIN SODIUM 1 G: 1 INJECTION, SOLUTION INTRAVENOUS at 09:34

## 2018-03-22 ASSESSMENT — PAIN SCALES - GENERAL: PAINLEVEL_OUTOF10: 2

## 2018-03-22 ASSESSMENT — PAIN DESCRIPTION - LOCATION: LOCATION: HEAD

## 2018-03-22 ASSESSMENT — PAIN DESCRIPTION - PAIN TYPE: TYPE: ACUTE PAIN;SURGICAL PAIN

## 2018-03-23 ENCOUNTER — ANESTHESIA (OUTPATIENT)
Dept: OPERATING ROOM | Age: 78
End: 2018-03-23
Payer: MEDICARE

## 2018-03-23 ENCOUNTER — HOSPITAL ENCOUNTER (OUTPATIENT)
Age: 78
Setting detail: OUTPATIENT SURGERY
Discharge: HOME | End: 2018-03-23
Attending: SPECIALIST | Admitting: SPECIALIST
Payer: MEDICARE

## 2018-03-23 ENCOUNTER — ANESTHESIA EVENT (OUTPATIENT)
Dept: OPERATING ROOM | Age: 78
End: 2018-03-23
Payer: MEDICARE

## 2018-03-23 VITALS
WEIGHT: 193.6 LBS | BODY MASS INDEX: 27.72 KG/M2 | RESPIRATION RATE: 10 BRPM | DIASTOLIC BLOOD PRESSURE: 70 MMHG | TEMPERATURE: 97 F | HEIGHT: 70 IN | HEART RATE: 80 BPM | SYSTOLIC BLOOD PRESSURE: 128 MMHG | OXYGEN SATURATION: 96 %

## 2018-03-23 VITALS
RESPIRATION RATE: 12 BRPM | DIASTOLIC BLOOD PRESSURE: 58 MMHG | SYSTOLIC BLOOD PRESSURE: 100 MMHG | OXYGEN SATURATION: 96 %

## 2018-03-23 PROCEDURE — 6370000000 HC RX 637 (ALT 250 FOR IP): Performed by: SPECIALIST

## 2018-03-23 PROCEDURE — 2500000003 HC RX 250 WO HCPCS

## 2018-03-23 PROCEDURE — 3700000001 HC ADD 15 MINUTES (ANESTHESIA): Performed by: SPECIALIST

## 2018-03-23 PROCEDURE — 7100000000 HC PACU RECOVERY - FIRST 15 MIN: Performed by: SPECIALIST

## 2018-03-23 PROCEDURE — 6360000002 HC RX W HCPCS: Performed by: SPECIALIST

## 2018-03-23 PROCEDURE — 3600000012 HC SURGERY LEVEL 2 ADDTL 15MIN: Performed by: SPECIALIST

## 2018-03-23 PROCEDURE — 7100000011 HC PHASE II RECOVERY - ADDTL 15 MIN: Performed by: SPECIALIST

## 2018-03-23 PROCEDURE — 6360000002 HC RX W HCPCS: Performed by: NURSE ANESTHETIST, CERTIFIED REGISTERED

## 2018-03-23 PROCEDURE — 2580000003 HC RX 258: Performed by: NURSE ANESTHETIST, CERTIFIED REGISTERED

## 2018-03-23 PROCEDURE — 3700000000 HC ANESTHESIA ATTENDED CARE: Performed by: SPECIALIST

## 2018-03-23 PROCEDURE — 2500000003 HC RX 250 WO HCPCS: Performed by: NURSE ANESTHETIST, CERTIFIED REGISTERED

## 2018-03-23 PROCEDURE — 7100000001 HC PACU RECOVERY - ADDTL 15 MIN: Performed by: SPECIALIST

## 2018-03-23 PROCEDURE — 6360000002 HC RX W HCPCS

## 2018-03-23 PROCEDURE — 7100000010 HC PHASE II RECOVERY - FIRST 15 MIN: Performed by: SPECIALIST

## 2018-03-23 PROCEDURE — 6360000002 HC RX W HCPCS: Performed by: ANESTHESIOLOGY

## 2018-03-23 PROCEDURE — 6370000000 HC RX 637 (ALT 250 FOR IP)

## 2018-03-23 PROCEDURE — 3600000002 HC SURGERY LEVEL 2 BASE: Performed by: SPECIALIST

## 2018-03-23 RX ORDER — MEPERIDINE HYDROCHLORIDE 25 MG/ML
12.5 INJECTION INTRAMUSCULAR; INTRAVENOUS; SUBCUTANEOUS EVERY 5 MIN PRN
Status: DISCONTINUED | OUTPATIENT
Start: 2018-03-23 | End: 2018-03-23 | Stop reason: HOSPADM

## 2018-03-23 RX ORDER — LIDOCAINE HYDROCHLORIDE 20 MG/ML
INJECTION, SOLUTION INFILTRATION; PERINEURAL PRN
Status: DISCONTINUED | OUTPATIENT
Start: 2018-03-23 | End: 2018-03-23 | Stop reason: SDUPTHER

## 2018-03-23 RX ORDER — ONDANSETRON 2 MG/ML
4 INJECTION INTRAMUSCULAR; INTRAVENOUS
Status: DISCONTINUED | OUTPATIENT
Start: 2018-03-23 | End: 2018-03-23 | Stop reason: HOSPADM

## 2018-03-23 RX ORDER — CEFADROXIL 500 MG/1
500 CAPSULE ORAL 2 TIMES DAILY
Status: ON HOLD | COMMUNITY
End: 2018-04-03 | Stop reason: ALTCHOICE

## 2018-03-23 RX ORDER — GINSENG 100 MG
CAPSULE ORAL PRN
Status: DISCONTINUED | OUTPATIENT
Start: 2018-03-23 | End: 2018-03-23 | Stop reason: HOSPADM

## 2018-03-23 RX ORDER — LABETALOL HYDROCHLORIDE 5 MG/ML
5 INJECTION, SOLUTION INTRAVENOUS EVERY 10 MIN PRN
Status: DISCONTINUED | OUTPATIENT
Start: 2018-03-23 | End: 2018-03-23 | Stop reason: HOSPADM

## 2018-03-23 RX ORDER — SODIUM CHLORIDE 9 MG/ML
INJECTION, SOLUTION INTRAVENOUS CONTINUOUS PRN
Status: DISCONTINUED | OUTPATIENT
Start: 2018-03-23 | End: 2018-03-23 | Stop reason: SDUPTHER

## 2018-03-23 RX ORDER — FENTANYL CITRATE 50 UG/ML
INJECTION, SOLUTION INTRAMUSCULAR; INTRAVENOUS PRN
Status: DISCONTINUED | OUTPATIENT
Start: 2018-03-23 | End: 2018-03-23 | Stop reason: SDUPTHER

## 2018-03-23 RX ORDER — EPHEDRINE SULFATE 50 MG/ML
INJECTION INTRAVENOUS PRN
Status: DISCONTINUED | OUTPATIENT
Start: 2018-03-23 | End: 2018-03-23 | Stop reason: SDUPTHER

## 2018-03-23 RX ORDER — SUCCINYLCHOLINE CHLORIDE 20 MG/ML
INJECTION INTRAMUSCULAR; INTRAVENOUS PRN
Status: DISCONTINUED | OUTPATIENT
Start: 2018-03-23 | End: 2018-03-23 | Stop reason: SDUPTHER

## 2018-03-23 RX ORDER — FENTANYL CITRATE 50 UG/ML
50 INJECTION, SOLUTION INTRAMUSCULAR; INTRAVENOUS EVERY 5 MIN PRN
Status: DISCONTINUED | OUTPATIENT
Start: 2018-03-23 | End: 2018-03-23 | Stop reason: HOSPADM

## 2018-03-23 RX ORDER — PROPOFOL 10 MG/ML
INJECTION, EMULSION INTRAVENOUS PRN
Status: DISCONTINUED | OUTPATIENT
Start: 2018-03-23 | End: 2018-03-23 | Stop reason: SDUPTHER

## 2018-03-23 RX ORDER — HYDROCODONE BITARTRATE AND ACETAMINOPHEN 5; 325 MG/1; MG/1
TABLET ORAL
Status: COMPLETED
Start: 2018-03-23 | End: 2018-03-23

## 2018-03-23 RX ORDER — HYDROCODONE BITARTRATE AND ACETAMINOPHEN 5; 325 MG/1; MG/1
1 TABLET ORAL ONCE
Status: COMPLETED | OUTPATIENT
Start: 2018-03-23 | End: 2018-03-23

## 2018-03-23 RX ORDER — GENTAMICIN SULFATE 40 MG/ML
INJECTION, SOLUTION INTRAMUSCULAR; INTRAVENOUS PRN
Status: DISCONTINUED | OUTPATIENT
Start: 2018-03-23 | End: 2018-03-23 | Stop reason: HOSPADM

## 2018-03-23 RX ADMIN — PHENYLEPHRINE HYDROCHLORIDE 200 MCG: 10 INJECTION INTRAVENOUS at 15:31

## 2018-03-23 RX ADMIN — SUCCINYLCHOLINE CHLORIDE 120 MG: 20 INJECTION, SOLUTION INTRAMUSCULAR; INTRAVENOUS at 14:55

## 2018-03-23 RX ADMIN — PHENYLEPHRINE HYDROCHLORIDE 100 MCG: 10 INJECTION INTRAVENOUS at 15:55

## 2018-03-23 RX ADMIN — HYDROCODONE BITARTRATE AND ACETAMINOPHEN 1 TABLET: 5; 325 TABLET ORAL at 17:52

## 2018-03-23 RX ADMIN — PHENYLEPHRINE HYDROCHLORIDE 100 MCG: 10 INJECTION INTRAVENOUS at 15:14

## 2018-03-23 RX ADMIN — EPHEDRINE SULFATE 10 MG: 50 INJECTION, SOLUTION INTRAVENOUS at 15:05

## 2018-03-23 RX ADMIN — FENTANYL CITRATE 50 MCG: 50 INJECTION INTRAMUSCULAR; INTRAVENOUS at 17:08

## 2018-03-23 RX ADMIN — PROPOFOL 150 MG: 10 INJECTION, EMULSION INTRAVENOUS at 14:37

## 2018-03-23 RX ADMIN — FENTANYL CITRATE 50 MCG: 50 INJECTION INTRAMUSCULAR; INTRAVENOUS at 14:55

## 2018-03-23 RX ADMIN — PHENYLEPHRINE HYDROCHLORIDE 100 MCG: 10 INJECTION INTRAVENOUS at 15:16

## 2018-03-23 RX ADMIN — PHENYLEPHRINE HYDROCHLORIDE 150 MCG: 10 INJECTION INTRAVENOUS at 15:22

## 2018-03-23 RX ADMIN — LIDOCAINE HYDROCHLORIDE 60 MG: 20 INJECTION, SOLUTION INFILTRATION; PERINEURAL at 14:37

## 2018-03-23 RX ADMIN — PROPOFOL 50 MG: 10 INJECTION, EMULSION INTRAVENOUS at 14:54

## 2018-03-23 RX ADMIN — CEFAZOLIN SODIUM 1 G: 1 INJECTION, SOLUTION INTRAVENOUS at 14:46

## 2018-03-23 RX ADMIN — PHENYLEPHRINE HYDROCHLORIDE 100 MCG: 10 INJECTION INTRAVENOUS at 14:46

## 2018-03-23 RX ADMIN — FENTANYL CITRATE 50 MCG: 50 INJECTION INTRAMUSCULAR; INTRAVENOUS at 14:37

## 2018-03-23 RX ADMIN — FENTANYL CITRATE 50 MCG: 50 INJECTION INTRAMUSCULAR; INTRAVENOUS at 16:35

## 2018-03-23 RX ADMIN — FENTANYL CITRATE 50 MCG: 50 INJECTION INTRAMUSCULAR; INTRAVENOUS at 15:10

## 2018-03-23 RX ADMIN — PHENYLEPHRINE HYDROCHLORIDE 100 MCG: 10 INJECTION INTRAVENOUS at 14:57

## 2018-03-23 RX ADMIN — SODIUM CHLORIDE: 9 INJECTION, SOLUTION INTRAVENOUS at 14:34

## 2018-03-23 ASSESSMENT — PULMONARY FUNCTION TESTS
PIF_VALUE: 21
PIF_VALUE: 20
PIF_VALUE: 5
PIF_VALUE: 20
PIF_VALUE: 20
PIF_VALUE: 24
PIF_VALUE: 21
PIF_VALUE: 4
PIF_VALUE: 21
PIF_VALUE: 21
PIF_VALUE: 4
PIF_VALUE: 31
PIF_VALUE: 25
PIF_VALUE: 2
PIF_VALUE: 1
PIF_VALUE: 23
PIF_VALUE: 3
PIF_VALUE: 1
PIF_VALUE: 5
PIF_VALUE: 21
PIF_VALUE: 20
PIF_VALUE: 4
PIF_VALUE: 20
PIF_VALUE: 3
PIF_VALUE: 20
PIF_VALUE: 21
PIF_VALUE: 4
PIF_VALUE: 29
PIF_VALUE: 21
PIF_VALUE: 19
PIF_VALUE: 20
PIF_VALUE: 19
PIF_VALUE: 19
PIF_VALUE: 21
PIF_VALUE: 22
PIF_VALUE: 0
PIF_VALUE: 20
PIF_VALUE: 20
PIF_VALUE: 21
PIF_VALUE: 19
PIF_VALUE: 4
PIF_VALUE: 20
PIF_VALUE: 21
PIF_VALUE: 19
PIF_VALUE: 20
PIF_VALUE: 19
PIF_VALUE: 19
PIF_VALUE: 21
PIF_VALUE: 20
PIF_VALUE: 21
PIF_VALUE: 1
PIF_VALUE: 19
PIF_VALUE: 2
PIF_VALUE: 3
PIF_VALUE: 21
PIF_VALUE: 20
PIF_VALUE: 22
PIF_VALUE: 4
PIF_VALUE: 0
PIF_VALUE: 20
PIF_VALUE: 22
PIF_VALUE: 22
PIF_VALUE: 0
PIF_VALUE: 21
PIF_VALUE: 21
PIF_VALUE: 20
PIF_VALUE: 21
PIF_VALUE: 24
PIF_VALUE: 24
PIF_VALUE: 4
PIF_VALUE: 1
PIF_VALUE: 20
PIF_VALUE: 21
PIF_VALUE: 1
PIF_VALUE: 23
PIF_VALUE: 2
PIF_VALUE: 0
PIF_VALUE: 22
PIF_VALUE: 2
PIF_VALUE: 20
PIF_VALUE: 19
PIF_VALUE: 1
PIF_VALUE: 21
PIF_VALUE: 21
PIF_VALUE: 20
PIF_VALUE: 19
PIF_VALUE: 19
PIF_VALUE: 6
PIF_VALUE: 0
PIF_VALUE: 21
PIF_VALUE: 20
PIF_VALUE: 21
PIF_VALUE: 0
PIF_VALUE: 1
PIF_VALUE: 22
PIF_VALUE: 4
PIF_VALUE: 21
PIF_VALUE: 28
PIF_VALUE: 20
PIF_VALUE: 21
PIF_VALUE: 4
PIF_VALUE: 19
PIF_VALUE: 4
PIF_VALUE: 21
PIF_VALUE: 2
PIF_VALUE: 4
PIF_VALUE: 20
PIF_VALUE: 20
PIF_VALUE: 1
PIF_VALUE: 20
PIF_VALUE: 4
PIF_VALUE: 13
PIF_VALUE: 20
PIF_VALUE: 20
PIF_VALUE: 19
PIF_VALUE: 20
PIF_VALUE: 20
PIF_VALUE: 19
PIF_VALUE: 20
PIF_VALUE: 22
PIF_VALUE: 11
PIF_VALUE: 22
PIF_VALUE: 19
PIF_VALUE: 3
PIF_VALUE: 5
PIF_VALUE: 20
PIF_VALUE: 19
PIF_VALUE: 2
PIF_VALUE: 23
PIF_VALUE: 1

## 2018-03-23 ASSESSMENT — PAIN DESCRIPTION - DESCRIPTORS: DESCRIPTORS: OTHER (COMMENT)

## 2018-03-23 ASSESSMENT — PAIN SCALES - GENERAL
PAINLEVEL_OUTOF10: 5
PAINLEVEL_OUTOF10: 7

## 2018-03-23 ASSESSMENT — PAIN - FUNCTIONAL ASSESSMENT: PAIN_FUNCTIONAL_ASSESSMENT: 0-10

## 2018-03-23 ASSESSMENT — PAIN SCALES - WONG BAKER: WONGBAKER_NUMERICALRESPONSE: 0

## 2018-03-23 NOTE — ANESTHESIA PRE PROCEDURE
Department of Anesthesiology  Preprocedure Note       Name:  Nisha Castellanos   Age:  65 y.o.  :  1940                                          MRN:  [id number]         Date:  3/23/2018      Surgeon: Indra Jose):  Mima Velasquez MD    Procedure: Procedure(s):  CLOSURE OF MOHS OF THE SCALP    Medications prior to admission:   Prior to Admission medications    Medication Sig Start Date End Date Taking?  Authorizing Provider   cefadroxil (DURICEF) 500 MG capsule Take 500 mg by mouth 2 times daily   Yes Historical Provider, MD   acetaminophen (TYLENOL) 325 MG tablet Take 650 mg by mouth every 6 hours as needed for Pain   Yes Historical Provider, MD   amLODIPine (NORVASC) 5 MG tablet Take 5 mg by mouth daily   Yes Historical Provider, MD   losartan (COZAAR) 100 MG tablet Take 100 mg by mouth daily   Yes Historical Provider, MD       Current medications:    Current Facility-Administered Medications   Medication Dose Route Frequency Provider Last Rate Last Dose    ceFAZolin (ANCEF) 1 g in dextrose 5 % 50 mL IVPB (premix)  1 g Intravenous On Call to 56 Michelle Albert MD           Allergies:  No Known Allergies    Problem List:    Patient Active Problem List   Diagnosis Code    Squamous cell cancer of skin of crown C44.42       Past Medical History:        Diagnosis Date    Arthritis     Cancer ([facility] 1977    kidney, right - surgery & radiation    Cancer ([facility]     skin    GERD (gastroesophageal reflux disease)     Hypertension        Past Surgical History:        Procedure Laterality Date    COLONOSCOPY      last one     KIDNEY REMOVAL  1977    right    MOHS SURGERY  2018    MOHS repair SCC at apex of scalp    MOHS SURGERY N/A 2018    Mohs repair of SCC of apex scalp    [place] OFFICE/OUTPT VISIT,PROCEDURE ONLY N/A 3/16/2018    SCC OF APEX OF SCALP radical removal of bone performed by Mima Velasquez MD at [address] [place] OFFICE/OUTPT VISIT,PROCEDURE ONLY N/A 3/21/2018    MOHS

## 2018-04-03 ENCOUNTER — HOSPITAL ENCOUNTER (INPATIENT)
Age: 78
LOS: 3 days | Discharge: HOME | DRG: 863 | End: 2018-04-06
Attending: INTERNAL MEDICINE | Admitting: INTERNAL MEDICINE
Payer: MEDICARE

## 2018-04-03 ENCOUNTER — HOSPITAL ENCOUNTER (OUTPATIENT)
Age: 78
Setting detail: SPECIMEN
Discharge: HOME | DRG: 863 | End: 2018-04-03
Payer: MEDICARE

## 2018-04-03 DIAGNOSIS — L03.811 CELLULITIS OF SCALP: Primary | ICD-10-CM

## 2018-04-03 PROBLEM — T81.328A: Status: ACTIVE | Noted: 2018-04-03

## 2018-04-03 PROBLEM — I96 MYOCUTANEOUS FLAP NECROSIS (HCC): Status: ACTIVE | Noted: 2018-04-03

## 2018-04-03 PROBLEM — T84.89XA MYOCUTANEOUS FLAP NECROSIS (HCC): Status: ACTIVE | Noted: 2018-04-03

## 2018-04-03 PROBLEM — T81.32XA: Status: ACTIVE | Noted: 2018-04-03

## 2018-04-03 LAB
ALBUMIN SERPL-MCNC: 3.7 G/DL (ref 3.5–5.1)
ALP BLD-CCNC: 99 U/L (ref 38–126)
ALT SERPL-CCNC: 8 U/L (ref 11–66)
ANION GAP SERPL CALCULATED.3IONS-SCNC: 11 MEQ/L (ref 8–16)
AST SERPL-CCNC: 23 U/L (ref 5–40)
BILIRUB SERPL-MCNC: 0.3 MG/DL (ref 0.3–1.2)
BILIRUBIN DIRECT: < 0.2 MG/DL (ref 0–0.3)
BUN BLDV-MCNC: 18 MG/DL (ref 7–22)
CALCIUM SERPL-MCNC: 9.5 MG/DL (ref 8.5–10.5)
CHLORIDE BLD-SCNC: 101 MEQ/L (ref 98–111)
CO2: 29 MEQ/L (ref 23–33)
CREAT SERPL-MCNC: 0.9 MG/DL (ref 0.4–1.2)
GFR SERPL CREATININE-BSD FRML MDRD: 81 ML/MIN/1.73M2
GLUCOSE BLD-MCNC: 110 MG/DL (ref 70–108)
HCT VFR BLD CALC: 35.2 % (ref 42–52)
HEMOGLOBIN: 11.9 GM/DL (ref 14–18)
MCH RBC QN AUTO: 34.3 PG (ref 27–31)
MCHC RBC AUTO-ENTMCNC: 33.9 GM/DL (ref 33–37)
MCV RBC AUTO: 101.1 FL (ref 80–94)
PDW BLD-RTO: 12.6 % (ref 11.5–14.5)
PLATELET # BLD: 492 THOU/MM3 (ref 130–400)
PMV BLD AUTO: 7.1 FL (ref 7.4–10.4)
POTASSIUM SERPL-SCNC: 5.3 MEQ/L (ref 3.5–5.2)
RBC # BLD: 3.48 MILL/MM3 (ref 4.7–6.1)
SODIUM BLD-SCNC: 141 MEQ/L (ref 135–145)
TOTAL PROTEIN: 7.4 G/DL (ref 6.1–8)
WBC # BLD: 9.5 THOU/MM3 (ref 4.8–10.8)

## 2018-04-03 PROCEDURE — 87070 CULTURE OTHR SPECIMN AEROBIC: CPT

## 2018-04-03 PROCEDURE — 87205 SMEAR GRAM STAIN: CPT

## 2018-04-03 PROCEDURE — 87106 FUNGI IDENTIFICATION YEAST: CPT

## 2018-04-03 PROCEDURE — 80053 COMPREHEN METABOLIC PANEL: CPT

## 2018-04-03 PROCEDURE — 87186 SC STD MICRODIL/AGAR DIL: CPT

## 2018-04-03 PROCEDURE — 87075 CULTR BACTERIA EXCEPT BLOOD: CPT

## 2018-04-03 PROCEDURE — 1200000000 HC SEMI PRIVATE

## 2018-04-03 PROCEDURE — 87184 SC STD DISK METHOD PER PLATE: CPT

## 2018-04-03 PROCEDURE — 87077 CULTURE AEROBIC IDENTIFY: CPT

## 2018-04-03 PROCEDURE — 82248 BILIRUBIN DIRECT: CPT

## 2018-04-03 PROCEDURE — 6370000000 HC RX 637 (ALT 250 FOR IP): Performed by: INTERNAL MEDICINE

## 2018-04-03 PROCEDURE — 36415 COLL VENOUS BLD VENIPUNCTURE: CPT

## 2018-04-03 PROCEDURE — 6360000002 HC RX W HCPCS: Performed by: INTERNAL MEDICINE

## 2018-04-03 PROCEDURE — 2580000003 HC RX 258: Performed by: INTERNAL MEDICINE

## 2018-04-03 PROCEDURE — 87147 CULTURE TYPE IMMUNOLOGIC: CPT

## 2018-04-03 PROCEDURE — 85027 COMPLETE CBC AUTOMATED: CPT

## 2018-04-03 RX ORDER — DOXYCYCLINE HYCLATE 100 MG
100 TABLET ORAL 2 TIMES DAILY
Status: ON HOLD | COMMUNITY
Start: 2018-03-26 | End: 2018-04-06 | Stop reason: HOSPADM

## 2018-04-03 RX ORDER — CIPROFLOXACIN 500 MG/1
500 TABLET, FILM COATED ORAL 2 TIMES DAILY
Status: DISCONTINUED | OUTPATIENT
Start: 2018-04-03 | End: 2018-04-03

## 2018-04-03 RX ORDER — ACETAMINOPHEN 325 MG/1
650 TABLET ORAL EVERY 6 HOURS PRN
Status: DISCONTINUED | OUTPATIENT
Start: 2018-04-03 | End: 2018-04-06 | Stop reason: HOSPADM

## 2018-04-03 RX ORDER — SODIUM CHLORIDE 0.9 % (FLUSH) 0.9 %
10 SYRINGE (ML) INJECTION EVERY 12 HOURS SCHEDULED
Status: DISCONTINUED | OUTPATIENT
Start: 2018-04-03 | End: 2018-04-06 | Stop reason: HOSPADM

## 2018-04-03 RX ORDER — AMLODIPINE BESYLATE 5 MG/1
5 TABLET ORAL DAILY
Status: DISCONTINUED | OUTPATIENT
Start: 2018-04-03 | End: 2018-04-06 | Stop reason: HOSPADM

## 2018-04-03 RX ORDER — LOSARTAN POTASSIUM 100 MG/1
100 TABLET ORAL DAILY
Status: DISCONTINUED | OUTPATIENT
Start: 2018-04-03 | End: 2018-04-06 | Stop reason: HOSPADM

## 2018-04-03 RX ORDER — CIPROFLOXACIN 500 MG/1
500 TABLET, FILM COATED ORAL 2 TIMES DAILY
Status: ON HOLD | COMMUNITY
Start: 2018-03-26 | End: 2018-04-06 | Stop reason: HOSPADM

## 2018-04-03 RX ORDER — DOXYCYCLINE HYCLATE 100 MG
100 TABLET ORAL 2 TIMES DAILY
Status: DISCONTINUED | OUTPATIENT
Start: 2018-04-03 | End: 2018-04-03

## 2018-04-03 RX ORDER — SODIUM CHLORIDE 0.9 % (FLUSH) 0.9 %
10 SYRINGE (ML) INJECTION PRN
Status: DISCONTINUED | OUTPATIENT
Start: 2018-04-03 | End: 2018-04-06 | Stop reason: HOSPADM

## 2018-04-03 RX ORDER — LIDOCAINE HYDROCHLORIDE 10 MG/ML
5 INJECTION, SOLUTION EPIDURAL; INFILTRATION; INTRACAUDAL; PERINEURAL ONCE
Status: DISCONTINUED | OUTPATIENT
Start: 2018-04-03 | End: 2018-04-06 | Stop reason: HOSPADM

## 2018-04-03 RX ADMIN — PIPERACILLIN SODIUM,TAZOBACTAM SODIUM 3.38 G: 3; .375 INJECTION, POWDER, FOR SOLUTION INTRAVENOUS at 19:33

## 2018-04-03 RX ADMIN — AMLODIPINE BESYLATE 5 MG: 5 TABLET ORAL at 21:10

## 2018-04-03 RX ADMIN — LOSARTAN POTASSIUM 100 MG: 100 TABLET, FILM COATED ORAL at 21:10

## 2018-04-03 RX ADMIN — ENOXAPARIN SODIUM 40 MG: 40 INJECTION SUBCUTANEOUS at 21:12

## 2018-04-03 RX ADMIN — Medication 10 ML: at 23:40

## 2018-04-03 ASSESSMENT — PAIN SCALES - GENERAL: PAINLEVEL_OUTOF10: 0

## 2018-04-04 ENCOUNTER — APPOINTMENT (OUTPATIENT)
Dept: GENERAL RADIOLOGY | Age: 78
DRG: 863 | End: 2018-04-04
Attending: INTERNAL MEDICINE
Payer: MEDICARE

## 2018-04-04 LAB
ANION GAP SERPL CALCULATED.3IONS-SCNC: 16 MEQ/L (ref 8–16)
BUN BLDV-MCNC: 16 MG/DL (ref 7–22)
CALCIUM SERPL-MCNC: 8.8 MG/DL (ref 8.5–10.5)
CHLORIDE BLD-SCNC: 99 MEQ/L (ref 98–111)
CO2: 23 MEQ/L (ref 23–33)
CREAT SERPL-MCNC: 1 MG/DL (ref 0.4–1.2)
GFR SERPL CREATININE-BSD FRML MDRD: 72 ML/MIN/1.73M2
GLUCOSE BLD-MCNC: 188 MG/DL (ref 70–108)
POTASSIUM SERPL-SCNC: 4 MEQ/L (ref 3.5–5.2)
SODIUM BLD-SCNC: 138 MEQ/L (ref 135–145)

## 2018-04-04 PROCEDURE — 71045 X-RAY EXAM CHEST 1 VIEW: CPT

## 2018-04-04 PROCEDURE — 36415 COLL VENOUS BLD VENIPUNCTURE: CPT

## 2018-04-04 PROCEDURE — 76937 US GUIDE VASCULAR ACCESS: CPT

## 2018-04-04 PROCEDURE — 6370000000 HC RX 637 (ALT 250 FOR IP): Performed by: INTERNAL MEDICINE

## 2018-04-04 PROCEDURE — 1200000000 HC SEMI PRIVATE

## 2018-04-04 PROCEDURE — 80048 BASIC METABOLIC PNL TOTAL CA: CPT

## 2018-04-04 PROCEDURE — 02H633Z INSERTION OF INFUSION DEVICE INTO RIGHT ATRIUM, PERCUTANEOUS APPROACH: ICD-10-PCS | Performed by: RADIOLOGY

## 2018-04-04 PROCEDURE — A4212 NON CORING NEEDLE OR STYLET: HCPCS

## 2018-04-04 PROCEDURE — 36569 INSJ PICC 5 YR+ W/O IMAGING: CPT

## 2018-04-04 PROCEDURE — C1751 CATH, INF, PER/CENT/MIDLINE: HCPCS

## 2018-04-04 PROCEDURE — 6360000002 HC RX W HCPCS: Performed by: INTERNAL MEDICINE

## 2018-04-04 PROCEDURE — 2580000003 HC RX 258: Performed by: INTERNAL MEDICINE

## 2018-04-04 RX ADMIN — PIPERACILLIN SODIUM,TAZOBACTAM SODIUM 3.38 G: 3; .375 INJECTION, POWDER, FOR SOLUTION INTRAVENOUS at 13:32

## 2018-04-04 RX ADMIN — VANCOMYCIN HYDROCHLORIDE 1250 MG: 10 INJECTION, POWDER, LYOPHILIZED, FOR SOLUTION INTRAVENOUS at 00:58

## 2018-04-04 RX ADMIN — LOSARTAN POTASSIUM 100 MG: 100 TABLET, FILM COATED ORAL at 08:26

## 2018-04-04 RX ADMIN — VANCOMYCIN HYDROCHLORIDE 1250 MG: 10 INJECTION, POWDER, LYOPHILIZED, FOR SOLUTION INTRAVENOUS at 13:06

## 2018-04-04 RX ADMIN — ENOXAPARIN SODIUM 40 MG: 40 INJECTION SUBCUTANEOUS at 13:07

## 2018-04-04 RX ADMIN — PIPERACILLIN SODIUM,TAZOBACTAM SODIUM 3.38 G: 3; .375 INJECTION, POWDER, FOR SOLUTION INTRAVENOUS at 18:07

## 2018-04-04 RX ADMIN — PIPERACILLIN SODIUM,TAZOBACTAM SODIUM 3.38 G: 3; .375 INJECTION, POWDER, FOR SOLUTION INTRAVENOUS at 04:22

## 2018-04-04 RX ADMIN — Medication 10 ML: at 20:20

## 2018-04-04 RX ADMIN — AMLODIPINE BESYLATE 5 MG: 5 TABLET ORAL at 08:26

## 2018-04-04 ASSESSMENT — PAIN SCALES - GENERAL: PAINLEVEL_OUTOF10: 0

## 2018-04-05 PROCEDURE — 6370000000 HC RX 637 (ALT 250 FOR IP): Performed by: INTERNAL MEDICINE

## 2018-04-05 PROCEDURE — 6370000000 HC RX 637 (ALT 250 FOR IP): Performed by: NURSE PRACTITIONER

## 2018-04-05 PROCEDURE — 2580000003 HC RX 258: Performed by: INTERNAL MEDICINE

## 2018-04-05 PROCEDURE — 1200000000 HC SEMI PRIVATE

## 2018-04-05 PROCEDURE — 6360000002 HC RX W HCPCS: Performed by: INTERNAL MEDICINE

## 2018-04-05 RX ORDER — FLUCONAZOLE 200 MG/1
200 TABLET ORAL DAILY
Status: DISCONTINUED | OUTPATIENT
Start: 2018-04-05 | End: 2018-04-06 | Stop reason: HOSPADM

## 2018-04-05 RX ADMIN — LOSARTAN POTASSIUM 100 MG: 100 TABLET, FILM COATED ORAL at 08:55

## 2018-04-05 RX ADMIN — FLUCONAZOLE 200 MG: 200 TABLET ORAL at 12:24

## 2018-04-05 RX ADMIN — VANCOMYCIN HYDROCHLORIDE 1250 MG: 10 INJECTION, POWDER, LYOPHILIZED, FOR SOLUTION INTRAVENOUS at 14:15

## 2018-04-05 RX ADMIN — PIPERACILLIN SODIUM,TAZOBACTAM SODIUM 3.38 G: 3; .375 INJECTION, POWDER, FOR SOLUTION INTRAVENOUS at 12:37

## 2018-04-05 RX ADMIN — VANCOMYCIN HYDROCHLORIDE 1250 MG: 10 INJECTION, POWDER, LYOPHILIZED, FOR SOLUTION INTRAVENOUS at 01:58

## 2018-04-05 RX ADMIN — PIPERACILLIN SODIUM,TAZOBACTAM SODIUM 3.38 G: 3; .375 INJECTION, POWDER, FOR SOLUTION INTRAVENOUS at 19:39

## 2018-04-05 RX ADMIN — Medication 10 ML: at 08:55

## 2018-04-05 RX ADMIN — ENOXAPARIN SODIUM 40 MG: 40 INJECTION SUBCUTANEOUS at 08:58

## 2018-04-05 RX ADMIN — AMLODIPINE BESYLATE 5 MG: 5 TABLET ORAL at 08:55

## 2018-04-05 RX ADMIN — PIPERACILLIN SODIUM,TAZOBACTAM SODIUM 3.38 G: 3; .375 INJECTION, POWDER, FOR SOLUTION INTRAVENOUS at 03:53

## 2018-04-05 RX ADMIN — Medication 10 ML: at 19:39

## 2018-04-06 VITALS
WEIGHT: 193 LBS | HEART RATE: 76 BPM | RESPIRATION RATE: 16 BRPM | SYSTOLIC BLOOD PRESSURE: 122 MMHG | HEIGHT: 70 IN | TEMPERATURE: 97.6 F | BODY MASS INDEX: 27.63 KG/M2 | DIASTOLIC BLOOD PRESSURE: 73 MMHG | OXYGEN SATURATION: 96 %

## 2018-04-06 LAB
CREAT SERPL-MCNC: 1 MG/DL (ref 0.4–1.2)
GFR SERPL CREATININE-BSD FRML MDRD: 72 ML/MIN/1.73M2
VANCOMYCIN TROUGH: 20 UG/ML (ref 5–15)

## 2018-04-06 PROCEDURE — 6360000002 HC RX W HCPCS: Performed by: NURSE PRACTITIONER

## 2018-04-06 PROCEDURE — 6360000002 HC RX W HCPCS: Performed by: INTERNAL MEDICINE

## 2018-04-06 PROCEDURE — 6370000000 HC RX 637 (ALT 250 FOR IP): Performed by: NURSE PRACTITIONER

## 2018-04-06 PROCEDURE — 6370000000 HC RX 637 (ALT 250 FOR IP): Performed by: INTERNAL MEDICINE

## 2018-04-06 PROCEDURE — 82565 ASSAY OF CREATININE: CPT

## 2018-04-06 PROCEDURE — 80202 ASSAY OF VANCOMYCIN: CPT

## 2018-04-06 PROCEDURE — 36592 COLLECT BLOOD FROM PICC: CPT

## 2018-04-06 PROCEDURE — 2580000003 HC RX 258: Performed by: NURSE PRACTITIONER

## 2018-04-06 PROCEDURE — 36415 COLL VENOUS BLD VENIPUNCTURE: CPT

## 2018-04-06 PROCEDURE — 2580000003 HC RX 258: Performed by: INTERNAL MEDICINE

## 2018-04-06 RX ORDER — CIPROFLOXACIN 750 MG/1
750 TABLET, FILM COATED ORAL EVERY 12 HOURS SCHEDULED
Qty: 28 TABLET | Refills: 0 | Status: SHIPPED | OUTPATIENT
Start: 2018-04-06 | End: 2018-04-20

## 2018-04-06 RX ORDER — FLUCONAZOLE 200 MG/1
200 TABLET ORAL DAILY
Qty: 7 TABLET | Refills: 0 | Status: SHIPPED | OUTPATIENT
Start: 2018-04-06 | End: 2018-04-13

## 2018-04-06 RX ADMIN — AMLODIPINE BESYLATE 5 MG: 5 TABLET ORAL at 09:20

## 2018-04-06 RX ADMIN — PIPERACILLIN SODIUM,TAZOBACTAM SODIUM 3.38 G: 3; .375 INJECTION, POWDER, FOR SOLUTION INTRAVENOUS at 04:44

## 2018-04-06 RX ADMIN — ENOXAPARIN SODIUM 40 MG: 40 INJECTION SUBCUTANEOUS at 09:24

## 2018-04-06 RX ADMIN — CEFTRIAXONE SODIUM 2 G: 2 INJECTION, POWDER, FOR SOLUTION INTRAMUSCULAR; INTRAVENOUS at 12:29

## 2018-04-06 RX ADMIN — VANCOMYCIN HYDROCHLORIDE 1250 MG: 10 INJECTION, POWDER, LYOPHILIZED, FOR SOLUTION INTRAVENOUS at 02:10

## 2018-04-06 RX ADMIN — Medication 10 ML: at 09:19

## 2018-04-06 RX ADMIN — LOSARTAN POTASSIUM 100 MG: 100 TABLET, FILM COATED ORAL at 09:20

## 2018-04-06 RX ADMIN — FLUCONAZOLE 200 MG: 200 TABLET ORAL at 09:20

## 2018-04-06 ASSESSMENT — PAIN SCALES - GENERAL: PAINLEVEL_OUTOF10: 0

## 2018-04-07 ENCOUNTER — HOSPITAL ENCOUNTER (OUTPATIENT)
Dept: GENERAL RADIOLOGY | Age: 78
Discharge: HOME | End: 2018-04-07
Payer: MEDICARE

## 2018-04-07 VITALS
HEART RATE: 77 BPM | OXYGEN SATURATION: 96 % | RESPIRATION RATE: 18 BRPM | TEMPERATURE: 98.1 F | SYSTOLIC BLOOD PRESSURE: 114 MMHG | DIASTOLIC BLOOD PRESSURE: 58 MMHG

## 2018-04-07 DIAGNOSIS — L03.811 CELLULITIS OF SCALP: ICD-10-CM

## 2018-04-07 LAB
AEROBIC CULTURE: ABNORMAL
GRAM STAIN RESULT: ABNORMAL
ORGANISM: ABNORMAL

## 2018-04-07 PROCEDURE — G0463 HOSPITAL OUTPT CLINIC VISIT: HCPCS

## 2018-04-07 PROCEDURE — 96365 THER/PROPH/DIAG IV INF INIT: CPT

## 2018-04-07 PROCEDURE — 6360000002 HC RX W HCPCS: Performed by: NURSE PRACTITIONER

## 2018-04-07 PROCEDURE — 2580000003 HC RX 258: Performed by: NURSE PRACTITIONER

## 2018-04-07 RX ORDER — 0.9 % SODIUM CHLORIDE 0.9 %
10 VIAL (ML) INJECTION PRN
Status: CANCELLED | OUTPATIENT
Start: 2018-04-07

## 2018-04-07 RX ORDER — 0.9 % SODIUM CHLORIDE 0.9 %
10 VIAL (ML) INJECTION PRN
Status: DISCONTINUED | OUTPATIENT
Start: 2018-04-07 | End: 2018-04-08 | Stop reason: HOSPADM

## 2018-04-07 RX ADMIN — CEFTRIAXONE SODIUM 2 G: 1 INJECTION, POWDER, FOR SOLUTION INTRAMUSCULAR; INTRAVENOUS at 11:12

## 2018-04-07 RX ADMIN — Medication 10 ML: at 11:41

## 2018-04-07 RX ADMIN — Medication 10 ML: at 11:12

## 2018-04-07 NOTE — PROGRESS NOTES
Ambulatory to [facility] room 1 for outpt IV antibiotics and dressing change. Vitals obtained, stable. Denies needs or concerns. Medicated as ordered.

## 2018-04-07 NOTE — PROGRESS NOTES
__met__ Safety   GOAL: Patient will have ID or Allergy band on in the Urgent Care       (Environmental)   [place] to environment   Ensure ID band is correct and in place/ allergy band as needed   Assess for fall risk   Initiate fall precautions as applicable (fall band, side rails, etc.)   Call light within reach   Bed in low position/ wheels locked    __met__ Pain   GOAL:  Patient pain will be under control while here in the Urgent Care      Assess pain level and characteristics   Administer analgesics as ordered   Assess effectiveness of pain management and report to MD as needed    _met__ Knowledge Deficit:   GOAL: Patient will be educated on the medication they are receiving here in the Urgent Care   Assess baseline knowledge   Provide teaching at level of understanding   Provide teaching via preferred learning method   Evaluate teaching effectiveness    __met__ Hemodynamic/Respiratory Status:   GOAL: Patient vital signs will be assessed and monitored in the Urgent Care       (Pre and Post Procedure Monitoring)   Assess/Monitor vital signs and LOC   Assess Baseline SpO2 prior to any sedation   Obtain weight/height   Assess vital signs/ LOC until patient meets discharge criteria   Monitor procedure site and notify MD of any issues    __met__ Infection-Risk of Central Venous Catheter:   GOAL: Patient will be monitored for infection while in the Urgent Care   Monitor for infection signs and symptoms (catheter site redness, temperature elevation, etc)   Assess for infection risks   Educate regarding infection prevention   Manage central venous catheter (flushes/ dressing changes per protocol)    CARE PLAN END DATE: 04/07/2018

## 2018-04-07 NOTE — PROGRESS NOTES
Old dressing removed from head. Brownish/green/yellow drainage noted to old dressing. Some drainage on skin, yellowish in color. No smell noted. Eschar area cleansed with betadine. Adaptic dressing applied to posterior head and top of head. Followed by 4x4 gauze and kerlix around head. Tube gauze applied over top of kerlix. No concerns.

## 2018-04-07 NOTE — PROGRESS NOTES
IV antibiotic completed. Tolerated well. Vitals obtained, stable. Denies needs or concerns. Ambulatory to exit with no problems.

## 2018-04-08 ENCOUNTER — HOSPITAL ENCOUNTER (OUTPATIENT)
Dept: GENERAL RADIOLOGY | Age: 78
Discharge: HOME | End: 2018-04-08
Payer: MEDICARE

## 2018-04-08 VITALS
SYSTOLIC BLOOD PRESSURE: 117 MMHG | OXYGEN SATURATION: 97 % | HEART RATE: 87 BPM | RESPIRATION RATE: 18 BRPM | DIASTOLIC BLOOD PRESSURE: 60 MMHG | TEMPERATURE: 98.3 F

## 2018-04-08 DIAGNOSIS — L03.811 CELLULITIS OF SCALP: ICD-10-CM

## 2018-04-08 LAB
AEROBIC CULTURE: ABNORMAL
ANAEROBIC CULTURE: ABNORMAL
GRAM STAIN RESULT: ABNORMAL
ORGANISM: ABNORMAL

## 2018-04-08 PROCEDURE — 96365 THER/PROPH/DIAG IV INF INIT: CPT

## 2018-04-08 PROCEDURE — 6360000002 HC RX W HCPCS: Performed by: NURSE PRACTITIONER

## 2018-04-08 PROCEDURE — 2580000003 HC RX 258: Performed by: NURSE PRACTITIONER

## 2018-04-08 PROCEDURE — G0463 HOSPITAL OUTPT CLINIC VISIT: HCPCS

## 2018-04-08 RX ORDER — 0.9 % SODIUM CHLORIDE 0.9 %
10 VIAL (ML) INJECTION PRN
Status: CANCELLED | OUTPATIENT
Start: 2018-04-08

## 2018-04-08 RX ORDER — 0.9 % SODIUM CHLORIDE 0.9 %
10 VIAL (ML) INJECTION PRN
Status: DISCONTINUED | OUTPATIENT
Start: 2018-04-08 | End: 2018-04-09 | Stop reason: HOSPADM

## 2018-04-08 RX ADMIN — CEFTRIAXONE 2 G: 1 INJECTION, POWDER, FOR SOLUTION INTRAMUSCULAR; INTRAVENOUS at 12:02

## 2018-04-08 RX ADMIN — Medication 10 ML: at 12:02

## 2018-04-08 RX ADMIN — Medication 10 ML: at 12:30

## 2018-04-08 NOTE — PROGRESS NOTES
IV infusion completed. Tolerated well. Vitals obtained, stable. Denies needs or concerns.  Ambulatory to exit with no problems

## 2018-04-08 NOTE — PROGRESS NOTES
Old dressing removed from head. Brownish/green/yellow drainage noted to old dressing. Some drainage on skin, yellowish in color. No smell noted. Eschar area cleansed with betadine. Adaptic dressing applied to posterior head as ordered. Followed by 4x4 gauze and kerlix around head. Tube gauze applied over top of kerlix. No concerns.

## 2018-04-08 NOTE — PROGRESS NOTES
__met__ Safety   GOAL: Patient will have ID or Allergy band on in the Urgent Care       (Environmental)   [place] to environment   Ensure ID band is correct and in place/ allergy band as needed   Assess for fall risk   Initiate fall precautions as applicable (fall band, side rails, etc.)   Call light within reach   Bed in low position/ wheels locked    __met__ Pain   GOAL:  Patient pain will be under control while here in the Urgent Care      Assess pain level and characteristics   Administer analgesics as ordered   Assess effectiveness of pain management and report to MD as needed    _met__ Knowledge Deficit:   GOAL: Patient will be educated on the medication they are receiving here in the Urgent Care   Assess baseline knowledge   Provide teaching at level of understanding   Provide teaching via preferred learning method   Evaluate teaching effectiveness    __met__ Hemodynamic/Respiratory Status:   GOAL: Patient vital signs will be assessed and monitored in the Urgent Care       (Pre and Post Procedure Monitoring)   Assess/Monitor vital signs and LOC   Assess Baseline SpO2 prior to any sedation   Obtain weight/height   Assess vital signs/ LOC until patient meets discharge criteria   Monitor procedure site and notify MD of any issues    __met__ Infection-Risk of Central Venous Catheter:   GOAL: Patient will be monitored for infection while in the Urgent Care   Monitor for infection signs and symptoms (catheter site redness, temperature elevation, etc)   Assess for infection risks   Educate regarding infection prevention   Manage central venous catheter (flushes/ dressing changes per protocol)    CARE PLAN END DATE: 04/08/2018

## 2018-04-08 NOTE — PROGRESS NOTES
Ambulatory to [facility] room 1 for outpt IV antibiotics and dressing change. Vitals obtained, stable. Denies needs or pain. Medicated as ordered.

## 2018-04-09 ENCOUNTER — HOSPITAL ENCOUNTER (OUTPATIENT)
Dept: GENERAL RADIOLOGY | Age: 78
Discharge: HOME | End: 2018-04-09
Payer: MEDICARE

## 2018-04-09 ENCOUNTER — HOSPITAL ENCOUNTER (OUTPATIENT)
Dept: HYPERBARIC MEDICINE | Age: 78
Discharge: HOME | End: 2018-04-09
Payer: MEDICARE

## 2018-04-09 ENCOUNTER — APPOINTMENT (OUTPATIENT)
Dept: HYPERBARIC MEDICINE | Age: 78
End: 2018-04-09
Payer: MEDICARE

## 2018-04-09 VITALS
SYSTOLIC BLOOD PRESSURE: 143 MMHG | HEART RATE: 72 BPM | TEMPERATURE: 97.2 F | DIASTOLIC BLOOD PRESSURE: 63 MMHG | OXYGEN SATURATION: 97 % | RESPIRATION RATE: 16 BRPM

## 2018-04-09 VITALS
WEIGHT: 193 LBS | HEIGHT: 70 IN | SYSTOLIC BLOOD PRESSURE: 167 MMHG | DIASTOLIC BLOOD PRESSURE: 76 MMHG | BODY MASS INDEX: 27.63 KG/M2 | TEMPERATURE: 96 F | OXYGEN SATURATION: 99 % | RESPIRATION RATE: 18 BRPM | HEART RATE: 72 BPM

## 2018-04-09 DIAGNOSIS — L03.811 CELLULITIS OF SCALP: ICD-10-CM

## 2018-04-09 LAB
ALBUMIN SERPL-MCNC: 3.6 G/DL (ref 3.5–5.1)
ALP BLD-CCNC: 87 U/L (ref 38–126)
ALT SERPL-CCNC: 13 U/L (ref 11–66)
ANION GAP SERPL CALCULATED.3IONS-SCNC: 17 MEQ/L (ref 8–16)
AST SERPL-CCNC: 23 U/L (ref 5–40)
BILIRUB SERPL-MCNC: 0.3 MG/DL (ref 0.3–1.2)
BUN BLDV-MCNC: 15 MG/DL (ref 7–22)
CALCIUM SERPL-MCNC: 8.9 MG/DL (ref 8.5–10.5)
CHLORIDE BLD-SCNC: 101 MEQ/L (ref 98–111)
CO2: 21 MEQ/L (ref 23–33)
CREAT SERPL-MCNC: 0.8 MG/DL (ref 0.4–1.2)
GFR SERPL CREATININE-BSD FRML MDRD: > 90 ML/MIN/1.73M2
GLUCOSE BLD-MCNC: 147 MG/DL (ref 70–108)
HCT VFR BLD CALC: 32.9 % (ref 42–52)
HEMOGLOBIN: 11 GM/DL (ref 14–18)
MCH RBC QN AUTO: 34.3 PG (ref 27–31)
MCHC RBC AUTO-ENTMCNC: 33.5 GM/DL (ref 33–37)
MCV RBC AUTO: 102.5 FL (ref 80–94)
PDW BLD-RTO: 13.6 % (ref 11.5–14.5)
PLATELET # BLD: 286 THOU/MM3 (ref 130–400)
PMV BLD AUTO: 8.1 FL (ref 7.4–10.4)
POTASSIUM SERPL-SCNC: 4 MEQ/L (ref 3.5–5.2)
RBC # BLD: 3.21 MILL/MM3 (ref 4.7–6.1)
SODIUM BLD-SCNC: 139 MEQ/L (ref 135–145)
TOTAL PROTEIN: 6.4 G/DL (ref 6.1–8)
WBC # BLD: 9.3 THOU/MM3 (ref 4.8–10.8)

## 2018-04-09 PROCEDURE — 85027 COMPLETE CBC AUTOMATED: CPT

## 2018-04-09 PROCEDURE — 6360000002 HC RX W HCPCS: Performed by: NURSE PRACTITIONER

## 2018-04-09 PROCEDURE — 36415 COLL VENOUS BLD VENIPUNCTURE: CPT

## 2018-04-09 PROCEDURE — 96365 THER/PROPH/DIAG IV INF INIT: CPT

## 2018-04-09 PROCEDURE — 2580000003 HC RX 258: Performed by: NURSE PRACTITIONER

## 2018-04-09 PROCEDURE — G0463 HOSPITAL OUTPT CLINIC VISIT: HCPCS

## 2018-04-09 PROCEDURE — 80053 COMPREHEN METABOLIC PANEL: CPT

## 2018-04-09 PROCEDURE — G0277 HBOT, FULL BODY CHAMBER, 30M: HCPCS

## 2018-04-09 RX ORDER — 0.9 % SODIUM CHLORIDE 0.9 %
10 VIAL (ML) INJECTION PRN
Status: CANCELLED | OUTPATIENT
Start: 2018-04-09

## 2018-04-09 RX ORDER — 0.9 % SODIUM CHLORIDE 0.9 %
10 VIAL (ML) INJECTION PRN
Status: DISCONTINUED | OUTPATIENT
Start: 2018-04-09 | End: 2018-04-10 | Stop reason: HOSPADM

## 2018-04-09 RX ADMIN — Medication 10 ML: at 10:15

## 2018-04-09 RX ADMIN — Medication 10 ML: at 10:52

## 2018-04-09 RX ADMIN — CEFTRIAXONE SODIUM 2 G: 1 INJECTION, POWDER, FOR SOLUTION INTRAMUSCULAR; INTRAVENOUS at 10:24

## 2018-04-09 ASSESSMENT — PAIN SCALES - GENERAL
PAINLEVEL_OUTOF10: 0
PAINLEVEL_OUTOF10: 0

## 2018-04-09 NOTE — PROGRESS NOTES
No change in patients condition. No adverse medication reaction noted with meds given. Discharge instructions discussed with pt and pt verbalized understanding of info given. Pt left stable and ambulated to exit.

## 2018-04-09 NOTE — PROGRESS NOTES
__met__ Safety   GOAL: Patient will have ID or Allergy band on in the Urgent Care       (Environmental)   [place] to environment   Ensure ID band is correct and in place/ allergy band as needed   Assess for fall risk   Initiate fall precautions as applicable (fall band, side rails, etc.)   Call light within reach   Bed in low position/ wheels locked    __met__ Pain   GOAL:  Patient pain will be under control while here in the Urgent Care      Assess pain level and characteristics   Administer analgesics as ordered   Assess effectiveness of pain management and report to MD as needed    _met__ Knowledge Deficit:   GOAL: Patient will be educated on the medication they are receiving here in the Urgent Care   Assess baseline knowledge   Provide teaching at level of understanding   Provide teaching via preferred learning method   Evaluate teaching effectiveness    __met__ Hemodynamic/Respiratory Status:   GOAL: Patient vital signs will be assessed and monitored in the Urgent Care       (Pre and Post Procedure Monitoring)   Assess/Monitor vital signs and LOC   Assess Baseline SpO2 prior to any sedation   Obtain weight/height   Assess vital signs/ LOC until patient meets discharge criteria   Monitor procedure site and notify MD of any issues    __met__ Infection-Risk of Central Venous Catheter:   GOAL: Patient will be monitored for infection while in the Urgent Care   Monitor for infection signs and symptoms (catheter site redness, temperature elevation, etc)   Assess for infection risks   Educate regarding infection prevention   Manage central venous catheter (flushes/ dressing changes per protocol)    CARE PLAN END DATE: 04/09/18

## 2018-04-09 NOTE — PROGRESS NOTES
Pt ambulated to triage room for assessment and care. Pulse is regular. Respirations are regular and unlabored. Mucous membranes are pink and moist. Alert and oriented to x 3. No c/o nausea or vomiting. ROM within pt limits. Calm and cooperative.

## 2018-04-09 NOTE — PROGRESS NOTES
Dressing to scalp was removed, mod amount of old, dried pink drainage to the adaptic dressing and 4 x 4 gauze pads on the back of scalp. Eschar wound to the top and sides of the scalp were wiped clean with betadine swabs x 2. Pt tolerated well. Adaptic dressing placed atop of the back of scalp wound, then placed 2,   4 x 4 gauze pads over top and wrapped head with Kerlix dressing and Stocking-net to hold dressing firmly in place.

## 2018-04-10 ENCOUNTER — HOSPITAL ENCOUNTER (OUTPATIENT)
Dept: HYPERBARIC MEDICINE | Age: 78
Discharge: HOME | End: 2018-04-10
Payer: MEDICARE

## 2018-04-10 ENCOUNTER — HOSPITAL ENCOUNTER (OUTPATIENT)
Dept: GENERAL RADIOLOGY | Age: 78
Discharge: HOME | End: 2018-04-10
Payer: MEDICARE

## 2018-04-10 VITALS
SYSTOLIC BLOOD PRESSURE: 147 MMHG | RESPIRATION RATE: 18 BRPM | HEART RATE: 77 BPM | DIASTOLIC BLOOD PRESSURE: 67 MMHG | OXYGEN SATURATION: 99 % | TEMPERATURE: 96.6 F

## 2018-04-10 VITALS
SYSTOLIC BLOOD PRESSURE: 124 MMHG | TEMPERATURE: 97.7 F | DIASTOLIC BLOOD PRESSURE: 59 MMHG | HEART RATE: 64 BPM | RESPIRATION RATE: 18 BRPM | OXYGEN SATURATION: 96 %

## 2018-04-10 DIAGNOSIS — L03.811 CELLULITIS OF SCALP: ICD-10-CM

## 2018-04-10 PROCEDURE — 6360000002 HC RX W HCPCS: Performed by: NURSE PRACTITIONER

## 2018-04-10 PROCEDURE — G0463 HOSPITAL OUTPT CLINIC VISIT: HCPCS

## 2018-04-10 PROCEDURE — G0277 HBOT, FULL BODY CHAMBER, 30M: HCPCS

## 2018-04-10 PROCEDURE — 2580000003 HC RX 258: Performed by: NURSE PRACTITIONER

## 2018-04-10 RX ORDER — 0.9 % SODIUM CHLORIDE 0.9 %
10 VIAL (ML) INJECTION PRN
Status: DISCONTINUED | OUTPATIENT
Start: 2018-04-10 | End: 2018-04-11 | Stop reason: HOSPADM

## 2018-04-10 RX ORDER — 0.9 % SODIUM CHLORIDE 0.9 %
10 VIAL (ML) INJECTION PRN
Status: CANCELLED | OUTPATIENT
Start: 2018-04-10

## 2018-04-10 RX ADMIN — CEFTRIAXONE SODIUM 2 G: 1 INJECTION, POWDER, FOR SOLUTION INTRAMUSCULAR; INTRAVENOUS at 10:05

## 2018-04-10 RX ADMIN — Medication 10 ML: at 10:05

## 2018-04-10 RX ADMIN — Medication 10 ML: at 10:39

## 2018-04-10 ASSESSMENT — PAIN SCALES - GENERAL
PAINLEVEL_OUTOF10: 0
PAINLEVEL_OUTOF10: 0

## 2018-04-10 NOTE — PROGRESS NOTES
__met__ Safety   GOAL: Patient will have ID or Allergy band on in the Urgent Care       (Environmental)   [place] to environment   Ensure ID band is correct and in place/ allergy band as needed   Assess for fall risk   Initiate fall precautions as applicable (fall band, side rails, etc.)   Call light within reach   Bed in low position/ wheels locked    __met__ Pain   GOAL:  Patient pain will be under control while here in the Urgent Care      Assess pain level and characteristics   Administer analgesics as ordered   Assess effectiveness of pain management and report to MD as needed    _met__ Knowledge Deficit:   GOAL: Patient will be educated on the medication they are receiving here in the Urgent Care   Assess baseline knowledge   Provide teaching at level of understanding   Provide teaching via preferred learning method   Evaluate teaching effectiveness    __met__ Hemodynamic/Respiratory Status:   GOAL: Patient vital signs will be assessed and monitored in the Urgent Care       (Pre and Post Procedure Monitoring)   Assess/Monitor vital signs and LOC   Assess Baseline SpO2 prior to any sedation   Obtain weight/height   Assess vital signs/ LOC until patient meets discharge criteria   Monitor procedure site and notify MD of any issues    __met__ Infection-Risk of Central Venous Catheter:   GOAL: Patient will be monitored for infection while in the Urgent Care   Monitor for infection signs and symptoms (catheter site redness, temperature elevation, etc)   Assess for infection risks   Educate regarding infection prevention   Manage central venous catheter (flushes/ dressing changes per protocol)    CARE PLAN END DATE: 04/10/2018

## 2018-04-10 NOTE — PROGRESS NOTES
Dressing to scalp was removed, small amount of old, dried pink drainage to the adaptic dressing and 4 x 4 gauze pads on the back of scalp. Eschar wound to the top and sides of the scalp were wiped clean with betadine swabs x 2. Pt tolerated well. Adaptic dressing placed atop of the back of scalp wound, then placed 2,   4 x 4 gauze pads over top and wrapped head with Kerlix dressing and Stocking-net to hold dressing firmly in place.

## 2018-04-10 NOTE — PROGRESS NOTES
IV infusion completed. Tolerated well. Vitals obtained, stable. Denies needs.  Ambulatory to exit with wife with no problems

## 2018-04-10 NOTE — PROGRESS NOTES
Ambulatory to [address] [place] [address] room for outpt IV antibiotics and dressing change. Vitals obtianed, stable. Denies needs or concerns. Medicated as ordered.

## 2018-04-11 ENCOUNTER — HOSPITAL ENCOUNTER (OUTPATIENT)
Dept: GENERAL RADIOLOGY | Age: 78
Discharge: HOME | End: 2018-04-11
Payer: MEDICARE

## 2018-04-11 ENCOUNTER — HOSPITAL ENCOUNTER (OUTPATIENT)
Dept: HYPERBARIC MEDICINE | Age: 78
Discharge: HOME | End: 2018-04-11
Payer: MEDICARE

## 2018-04-11 VITALS
HEART RATE: 72 BPM | DIASTOLIC BLOOD PRESSURE: 70 MMHG | TEMPERATURE: 96.3 F | RESPIRATION RATE: 18 BRPM | OXYGEN SATURATION: 99 % | SYSTOLIC BLOOD PRESSURE: 149 MMHG

## 2018-04-11 VITALS
RESPIRATION RATE: 18 BRPM | TEMPERATURE: 97.8 F | DIASTOLIC BLOOD PRESSURE: 65 MMHG | SYSTOLIC BLOOD PRESSURE: 142 MMHG | OXYGEN SATURATION: 98 % | HEART RATE: 68 BPM

## 2018-04-11 DIAGNOSIS — L03.811 CELLULITIS OF SCALP: ICD-10-CM

## 2018-04-11 PROCEDURE — G0277 HBOT, FULL BODY CHAMBER, 30M: HCPCS

## 2018-04-11 PROCEDURE — 2580000003 HC RX 258

## 2018-04-11 PROCEDURE — G0463 HOSPITAL OUTPT CLINIC VISIT: HCPCS

## 2018-04-11 PROCEDURE — 96365 THER/PROPH/DIAG IV INF INIT: CPT

## 2018-04-11 PROCEDURE — 6360000002 HC RX W HCPCS

## 2018-04-11 RX ORDER — DEXTROSE MONOHYDRATE 50 MG/ML
INJECTION, SOLUTION INTRAVENOUS
Status: COMPLETED
Start: 2018-04-11 | End: 2018-04-11

## 2018-04-11 RX ORDER — CEFTRIAXONE 1 G/1
INJECTION, POWDER, FOR SOLUTION INTRAMUSCULAR; INTRAVENOUS
Status: COMPLETED
Start: 2018-04-11 | End: 2018-04-11

## 2018-04-11 RX ORDER — 0.9 % SODIUM CHLORIDE 0.9 %
10 VIAL (ML) INJECTION PRN
Status: CANCELLED | OUTPATIENT
Start: 2018-04-11

## 2018-04-11 RX ADMIN — CEFTRIAXONE SODIUM 2 G: 1 INJECTION, POWDER, FOR SOLUTION INTRAMUSCULAR; INTRAVENOUS at 10:25

## 2018-04-11 RX ADMIN — DEXTROSE MONOHYDRATE 50 ML: 5 INJECTION INTRAVENOUS at 10:55

## 2018-04-11 ASSESSMENT — PAIN SCALES - GENERAL: PAINLEVEL_OUTOF10: 0

## 2018-04-11 NOTE — OP NOTE
PICC dressing changed per order. Mild skin redness noted while taking off occlusive dressing. Did not reapply 4x4  to monitor skin breakdown/sensitivity. Catheter intact infusing and flushed easily.

## 2018-04-12 ENCOUNTER — HOSPITAL ENCOUNTER (OUTPATIENT)
Dept: HYPERBARIC MEDICINE | Age: 78
Discharge: HOME | End: 2018-04-12
Payer: MEDICARE

## 2018-04-12 ENCOUNTER — HOSPITAL ENCOUNTER (OUTPATIENT)
Dept: GENERAL RADIOLOGY | Age: 78
Discharge: HOME | End: 2018-04-12
Payer: MEDICARE

## 2018-04-12 VITALS
OXYGEN SATURATION: 100 % | HEART RATE: 93 BPM | RESPIRATION RATE: 16 BRPM | DIASTOLIC BLOOD PRESSURE: 64 MMHG | SYSTOLIC BLOOD PRESSURE: 140 MMHG | TEMPERATURE: 95.7 F

## 2018-04-12 VITALS
SYSTOLIC BLOOD PRESSURE: 138 MMHG | OXYGEN SATURATION: 99 % | DIASTOLIC BLOOD PRESSURE: 65 MMHG | BODY MASS INDEX: 27.63 KG/M2 | HEIGHT: 70 IN | TEMPERATURE: 97.1 F | RESPIRATION RATE: 16 BRPM | HEART RATE: 71 BPM | WEIGHT: 193 LBS

## 2018-04-12 DIAGNOSIS — L03.811 CELLULITIS OF SCALP: ICD-10-CM

## 2018-04-12 LAB
ANION GAP SERPL CALCULATED.3IONS-SCNC: 11 MEQ/L (ref 8–16)
BUN BLDV-MCNC: 19 MG/DL (ref 7–22)
CALCIUM SERPL-MCNC: 8.6 MG/DL (ref 8.5–10.5)
CHLORIDE BLD-SCNC: 102 MEQ/L (ref 98–111)
CO2: 25 MEQ/L (ref 23–33)
CREAT SERPL-MCNC: 0.8 MG/DL (ref 0.4–1.2)
GFR SERPL CREATININE-BSD FRML MDRD: > 90 ML/MIN/1.73M2
GLUCOSE BLD-MCNC: 131 MG/DL (ref 70–108)
HCT VFR BLD CALC: 31.9 % (ref 42–52)
HEMOGLOBIN: 10.9 GM/DL (ref 14–18)
MCH RBC QN AUTO: 34.6 PG (ref 27–31)
MCHC RBC AUTO-ENTMCNC: 34.3 GM/DL (ref 33–37)
MCV RBC AUTO: 100.8 FL (ref 80–94)
PDW BLD-RTO: 13.5 % (ref 11.5–14.5)
PLATELET # BLD: 252 THOU/MM3 (ref 130–400)
PMV BLD AUTO: 8.7 FL (ref 7.4–10.4)
POTASSIUM SERPL-SCNC: 4.1 MEQ/L (ref 3.5–5.2)
RBC # BLD: 3.16 MILL/MM3 (ref 4.7–6.1)
SODIUM BLD-SCNC: 138 MEQ/L (ref 135–145)
WBC # BLD: 7.3 THOU/MM3 (ref 4.8–10.8)

## 2018-04-12 PROCEDURE — 80048 BASIC METABOLIC PNL TOTAL CA: CPT

## 2018-04-12 PROCEDURE — G0277 HBOT, FULL BODY CHAMBER, 30M: HCPCS

## 2018-04-12 PROCEDURE — G0463 HOSPITAL OUTPT CLINIC VISIT: HCPCS

## 2018-04-12 PROCEDURE — 96365 THER/PROPH/DIAG IV INF INIT: CPT

## 2018-04-12 PROCEDURE — 36415 COLL VENOUS BLD VENIPUNCTURE: CPT

## 2018-04-12 PROCEDURE — 85027 COMPLETE CBC AUTOMATED: CPT

## 2018-04-12 PROCEDURE — 6360000002 HC RX W HCPCS: Performed by: NURSE PRACTITIONER

## 2018-04-12 PROCEDURE — 2580000003 HC RX 258: Performed by: NURSE PRACTITIONER

## 2018-04-12 RX ORDER — 0.9 % SODIUM CHLORIDE 0.9 %
10 VIAL (ML) INJECTION PRN
Status: CANCELLED | OUTPATIENT
Start: 2018-04-12

## 2018-04-12 RX ORDER — DEXTROSE MONOHYDRATE 50 MG/ML
INJECTION, SOLUTION INTRAVENOUS
Status: DISPENSED
Start: 2018-04-12 | End: 2018-04-12

## 2018-04-12 RX ORDER — 0.9 % SODIUM CHLORIDE 0.9 %
10 VIAL (ML) INJECTION PRN
Status: DISCONTINUED | OUTPATIENT
Start: 2018-04-12 | End: 2018-04-13 | Stop reason: HOSPADM

## 2018-04-12 RX ADMIN — Medication 10 ML: at 10:51

## 2018-04-12 RX ADMIN — Medication 10 ML: at 10:18

## 2018-04-12 RX ADMIN — CEFTRIAXONE SODIUM 2 G: 1 INJECTION, POWDER, FOR SOLUTION INTRAMUSCULAR; INTRAVENOUS at 10:18

## 2018-04-12 ASSESSMENT — PAIN SCALES - GENERAL
PAINLEVEL_OUTOF10: 0
PAINLEVEL_OUTOF10: 0

## 2018-04-12 NOTE — PROGRESS NOTES
__met__ Safety   GOAL: Patient will have ID or Allergy band on in the Urgent Care       (Environmental)   [place] to environment   Ensure ID band is correct and in place/ allergy band as needed   Assess for fall risk   Initiate fall precautions as applicable (fall band, side rails, etc.)   Call light within reach   Bed in low position/ wheels locked    __met__ Pain   GOAL:  Patient pain will be under control while here in the Urgent Care      Assess pain level and characteristics   Administer analgesics as ordered   Assess effectiveness of pain management and report to MD as needed    _met__ Knowledge Deficit:   GOAL: Patient will be educated on the medication they are receiving here in the Urgent Care   Assess baseline knowledge   Provide teaching at level of understanding   Provide teaching via preferred learning method   Evaluate teaching effectiveness    __met__ Hemodynamic/Respiratory Status:   GOAL: Patient vital signs will be assessed and monitored in the Urgent Care       (Pre and Post Procedure Monitoring)   Assess/Monitor vital signs and LOC   Assess Baseline SpO2 prior to any sedation   Obtain weight/height   Assess vital signs/ LOC until patient meets discharge criteria   Monitor procedure site and notify MD of any issues    __met__ Infection-Risk of Central Venous Catheter:   GOAL: Patient will be monitored for infection while in the Urgent Care   Monitor for infection signs and symptoms (catheter site redness, temperature elevation, etc)   Assess for infection risks   Educate regarding infection prevention   Manage central venous catheter (flushes/ dressing changes per protocol)    CARE PLAN END DATE:   4-12-18

## 2018-04-13 ENCOUNTER — HOSPITAL ENCOUNTER (OUTPATIENT)
Dept: HYPERBARIC MEDICINE | Age: 78
Discharge: HOME | End: 2018-04-13
Payer: MEDICARE

## 2018-04-13 ENCOUNTER — HOSPITAL ENCOUNTER (OUTPATIENT)
Dept: GENERAL RADIOLOGY | Age: 78
Discharge: HOME | End: 2018-04-13
Payer: MEDICARE

## 2018-04-13 VITALS
DIASTOLIC BLOOD PRESSURE: 63 MMHG | RESPIRATION RATE: 18 BRPM | OXYGEN SATURATION: 99 % | TEMPERATURE: 96.7 F | HEART RATE: 65 BPM | SYSTOLIC BLOOD PRESSURE: 137 MMHG

## 2018-04-13 VITALS
TEMPERATURE: 96.6 F | SYSTOLIC BLOOD PRESSURE: 149 MMHG | HEART RATE: 68 BPM | DIASTOLIC BLOOD PRESSURE: 65 MMHG | RESPIRATION RATE: 18 BRPM | OXYGEN SATURATION: 99 %

## 2018-04-13 DIAGNOSIS — L03.811 CELLULITIS OF SCALP: ICD-10-CM

## 2018-04-13 PROCEDURE — 96365 THER/PROPH/DIAG IV INF INIT: CPT

## 2018-04-13 PROCEDURE — 2580000003 HC RX 258: Performed by: NURSE PRACTITIONER

## 2018-04-13 PROCEDURE — 6360000002 HC RX W HCPCS: Performed by: NURSE PRACTITIONER

## 2018-04-13 PROCEDURE — G0277 HBOT, FULL BODY CHAMBER, 30M: HCPCS

## 2018-04-13 RX ORDER — 0.9 % SODIUM CHLORIDE 0.9 %
10 VIAL (ML) INJECTION PRN
Status: CANCELLED | OUTPATIENT
Start: 2018-04-13

## 2018-04-13 RX ORDER — 0.9 % SODIUM CHLORIDE 0.9 %
10 VIAL (ML) INJECTION PRN
Status: DISCONTINUED | OUTPATIENT
Start: 2018-04-13 | End: 2018-04-14 | Stop reason: HOSPADM

## 2018-04-13 RX ADMIN — Medication 10 ML: at 09:56

## 2018-04-13 RX ADMIN — Medication 10 ML: at 09:28

## 2018-04-13 RX ADMIN — CEFTRIAXONE SODIUM 2 G: 2 INJECTION, POWDER, FOR SOLUTION INTRAMUSCULAR; INTRAVENOUS at 09:30

## 2018-04-13 ASSESSMENT — PAIN SCALES - GENERAL
PAINLEVEL_OUTOF10: 0
PAINLEVEL_OUTOF10: 0

## 2018-04-13 NOTE — PROGRESS NOTES
__met__ Safety   GOAL: Patient will have ID or Allergy band on in the Urgent Care       (Environmental)   [place] to environment   Ensure ID band is correct and in place/ allergy band as needed   Assess for fall risk   Initiate fall precautions as applicable (fall band, side rails, etc.)   Call light within reach   Bed in low position/ wheels locked    __met__ Pain   GOAL:  Patient pain will be under control while here in the Urgent Care      Assess pain level and characteristics   Administer analgesics as ordered   Assess effectiveness of pain management and report to MD as needed    _met__ Knowledge Deficit:   GOAL: Patient will be educated on the medication they are receiving here in the Urgent Care   Assess baseline knowledge   Provide teaching at level of understanding   Provide teaching via preferred learning method   Evaluate teaching effectiveness    __met__ Hemodynamic/Respiratory Status:   GOAL: Patient vital signs will be assessed and monitored in the Urgent Care       (Pre and Post Procedure Monitoring)   Assess/Monitor vital signs and LOC   Assess Baseline SpO2 prior to any sedation   Obtain weight/height   Assess vital signs/ LOC until patient meets discharge criteria   Monitor procedure site and notify MD of any issues    __met__ Infection-Risk of Central Venous Catheter:   GOAL: Patient will be monitored for infection while in the Urgent Care   Monitor for infection signs and symptoms (catheter site redness, temperature elevation, etc)   Assess for infection risks   Educate regarding infection prevention   Manage central venous catheter (flushes/ dressing changes per protocol)    CARE PLAN END DATE: 04/13/2018

## 2018-04-13 NOTE — PROGRESS NOTES
No adverse medication reaction noted with meds given. Discharge instructions discussed with pt and pt verbalized understanding of info given. Pt left stable and ambulated to exit.

## 2018-04-14 ENCOUNTER — HOSPITAL ENCOUNTER (OUTPATIENT)
Dept: GENERAL RADIOLOGY | Age: 78
Discharge: HOME | End: 2018-04-14
Payer: MEDICARE

## 2018-04-14 VITALS
OXYGEN SATURATION: 98 % | TEMPERATURE: 97.7 F | DIASTOLIC BLOOD PRESSURE: 62 MMHG | BODY MASS INDEX: 27.69 KG/M2 | SYSTOLIC BLOOD PRESSURE: 122 MMHG | RESPIRATION RATE: 18 BRPM | WEIGHT: 193 LBS | HEART RATE: 70 BPM

## 2018-04-14 DIAGNOSIS — L03.811 CELLULITIS OF SCALP: ICD-10-CM

## 2018-04-14 PROCEDURE — 2580000003 HC RX 258: Performed by: NURSE PRACTITIONER

## 2018-04-14 PROCEDURE — G0463 HOSPITAL OUTPT CLINIC VISIT: HCPCS

## 2018-04-14 PROCEDURE — 96365 THER/PROPH/DIAG IV INF INIT: CPT

## 2018-04-14 PROCEDURE — 6360000002 HC RX W HCPCS: Performed by: NURSE PRACTITIONER

## 2018-04-14 RX ORDER — 0.9 % SODIUM CHLORIDE 0.9 %
10 VIAL (ML) INJECTION PRN
Status: DISCONTINUED | OUTPATIENT
Start: 2018-04-14 | End: 2018-04-15 | Stop reason: HOSPADM

## 2018-04-14 RX ORDER — 0.9 % SODIUM CHLORIDE 0.9 %
10 VIAL (ML) INJECTION PRN
Status: CANCELLED | OUTPATIENT
Start: 2018-04-14

## 2018-04-14 RX ADMIN — Medication 10 ML: at 11:04

## 2018-04-14 RX ADMIN — Medication 10 ML: at 11:40

## 2018-04-14 RX ADMIN — CEFTRIAXONE SODIUM 2 G: 1 INJECTION, POWDER, FOR SOLUTION INTRAMUSCULAR; INTRAVENOUS at 11:03

## 2018-04-14 NOTE — PROGRESS NOTES
Infusion completed. Patient tolerated well. No adverse reactions noted at this time. Vital signs remain within normal limits. Discharge instructions reviewed with patient. Patient verbalized understanding. Ambulated to private transportation at this time.

## 2018-04-14 NOTE — PROGRESS NOTES
Patient ambulated to room for antibiotic infusion and dressing change. Denies concerns. Denies pain. Right arm PICC dressing remains clean, dry, and intact. Flushes with ease. No redness, edema, or discharge noted to PICC site. Dressing to head changed, as ordered. Wound beds remain pink, moist. Sutures remain well aligned. No drainage noted at this time.

## 2018-04-14 NOTE — PROGRESS NOTES
_Met__ Safety   GOAL: Patient will not experience fall during visit. (Environmental)   [place] to environment  [employer] ID band is correct and in place/ allergy band as needed   Assess for fall risk   Initiate fall precautions as applicable (fall band, side rails, etc.)   Call light within reach   Bed in low position/ wheels locked    __Met__ Pain   GOAL:   Patient will verbalize controlled pain.  Assess pain level and characteristics   Administer analgesics as ordered   Assess effectiveness of pain management and report to MD as needed    __Met__ Knowledge Deficit:   GOAL:  Patient will verbalize understanding of therapy plan.  Assess baseline knowledge   Provide teaching at level of understanding   Provide teaching via preferred learning method   Evaluate teaching effectiveness    __Met__ Hemodynamic/Respiratory Status:   GOAL:  Vital signs remain within normal limits prior and post treatment. (Pre and Post Procedure Monitoring)   Assess/Monitor vital signs and LOC   Assess Baseline SpO2 prior to any sedation   Obtain weight/height   Assess vital signs/ LOC until patient meets discharge criteria   Monitor procedure site and notify MD of any issues    __Met__ Infection-Risk of Central Venous Catheter:   GOAL:  Prevent infection to PICC line.     Monitor for infection signs and symptoms (catheter site redness, temperature elevation, etc)   Assess for infection risks   Educate regarding infection prevention   Manage central venous catheter (flushes/ dressing changes per protocol)    CARE PLAN END DATE:     4/27/2018

## 2018-04-15 ENCOUNTER — HOSPITAL ENCOUNTER (OUTPATIENT)
Dept: GENERAL RADIOLOGY | Age: 78
Discharge: HOME | End: 2018-04-15
Payer: MEDICARE

## 2018-04-15 VITALS
BODY MASS INDEX: 27.69 KG/M2 | WEIGHT: 193 LBS | RESPIRATION RATE: 16 BRPM | SYSTOLIC BLOOD PRESSURE: 130 MMHG | OXYGEN SATURATION: 98 % | TEMPERATURE: 97.1 F | HEART RATE: 68 BPM | DIASTOLIC BLOOD PRESSURE: 72 MMHG

## 2018-04-15 DIAGNOSIS — L03.811 CELLULITIS OF SCALP: ICD-10-CM

## 2018-04-15 PROCEDURE — 6360000002 HC RX W HCPCS: Performed by: NURSE PRACTITIONER

## 2018-04-15 PROCEDURE — G0463 HOSPITAL OUTPT CLINIC VISIT: HCPCS

## 2018-04-15 PROCEDURE — 2580000003 HC RX 258: Performed by: NURSE PRACTITIONER

## 2018-04-15 PROCEDURE — 96365 THER/PROPH/DIAG IV INF INIT: CPT

## 2018-04-15 RX ORDER — 0.9 % SODIUM CHLORIDE 0.9 %
10 VIAL (ML) INJECTION PRN
Status: CANCELLED | OUTPATIENT
Start: 2018-04-15

## 2018-04-15 RX ORDER — 0.9 % SODIUM CHLORIDE 0.9 %
10 VIAL (ML) INJECTION PRN
Status: DISCONTINUED | OUTPATIENT
Start: 2018-04-15 | End: 2018-04-16 | Stop reason: HOSPADM

## 2018-04-15 RX ADMIN — CEFTRIAXONE SODIUM 2 G: 2 INJECTION, POWDER, FOR SOLUTION INTRAMUSCULAR; INTRAVENOUS at 10:37

## 2018-04-15 RX ADMIN — Medication 10 ML: at 10:39

## 2018-04-15 RX ADMIN — Medication 10 ML: at 11:12

## 2018-04-15 NOTE — PROGRESS NOTES
met__ Safety  GOAL: Patient will have ID or Allergy band on in the Urgent Care  (Environmental)  · [place] to environment  · Ensure ID band is correct and in place/ allergy band as needed  · Assess for fall risk  · Initiate fall precautions as applicable (fall band, side rails, etc.)  · Call light within reach  · Bed in low position/ wheels locked     __met__ Pain  GOAL: Patient pain will be under control while here in the Urgent Care   · Assess pain level and characteristics  · Administer analgesics as ordered  · Assess effectiveness of pain management and report to MD as needed     _met__ Knowledge Deficit:  GOAL: Patient will be educated on the medication they are receiving here in the Urgent Care  · Assess baseline knowledge  · Provide teaching at level of understanding  · Provide teaching via preferred learning method  · Evaluate teaching effectiveness     __met__ Hemodynamic/Respiratory Status:  GOAL: Patient vital signs will be assessed and monitored in the Urgent Care  (Pre and Post Procedure Monitoring)  · Assess/Monitor vital signs and LOC  · Assess Baseline SpO2 prior to any sedation  · Obtain weight/height  · Assess vital signs/ LOC until patient meets discharge criteria  · Monitor procedure site and notify MD of any issues     __met__ Infection-Risk of Central Venous Catheter:  GOAL: Patient will be monitored for infection while in the Urgent Care  · Monitor for infection signs and symptoms (catheter site redness, temperature elevation, etc)  · Assess for infection risks  · Educate regarding infection prevention  · Manage central venous catheter (flushes/ dressing changes per protocol)     CARE PLAN END DATE:

## 2018-04-15 NOTE — PROGRESS NOTES
Old dressing removed, small amount of yellow green drainage noted on old dressing with small amount tan drainage also. Adaptic applied over open area, sutures intact, well approximated, kerlix applied over top of adaptic, tape to hold in place with zach dressing over top. Pt tolerates well.

## 2018-04-16 ENCOUNTER — HOSPITAL ENCOUNTER (OUTPATIENT)
Dept: GENERAL RADIOLOGY | Age: 78
Discharge: HOME | End: 2018-04-16
Payer: MEDICARE

## 2018-04-16 ENCOUNTER — HOSPITAL ENCOUNTER (OUTPATIENT)
Dept: HYPERBARIC MEDICINE | Age: 78
Discharge: HOME | End: 2018-04-16
Payer: MEDICARE

## 2018-04-16 VITALS
HEART RATE: 73 BPM | RESPIRATION RATE: 16 BRPM | OXYGEN SATURATION: 98 % | SYSTOLIC BLOOD PRESSURE: 139 MMHG | TEMPERATURE: 95.5 F | DIASTOLIC BLOOD PRESSURE: 65 MMHG

## 2018-04-16 VITALS
TEMPERATURE: 97.1 F | HEART RATE: 60 BPM | SYSTOLIC BLOOD PRESSURE: 130 MMHG | DIASTOLIC BLOOD PRESSURE: 61 MMHG | OXYGEN SATURATION: 97 % | RESPIRATION RATE: 18 BRPM

## 2018-04-16 DIAGNOSIS — L03.811 CELLULITIS OF SCALP: ICD-10-CM

## 2018-04-16 DIAGNOSIS — T86.828 SKIN FLAP NECROSIS (HCC): ICD-10-CM

## 2018-04-16 DIAGNOSIS — I96 SKIN FLAP NECROSIS (HCC): ICD-10-CM

## 2018-04-16 PROCEDURE — 6360000002 HC RX W HCPCS: Performed by: NURSE PRACTITIONER

## 2018-04-16 PROCEDURE — 2580000003 HC RX 258: Performed by: NURSE PRACTITIONER

## 2018-04-16 PROCEDURE — G0463 HOSPITAL OUTPT CLINIC VISIT: HCPCS

## 2018-04-16 PROCEDURE — 99183 HYPERBARIC OXYGEN THERAPY: CPT | Performed by: NURSE PRACTITIONER

## 2018-04-16 PROCEDURE — 96365 THER/PROPH/DIAG IV INF INIT: CPT

## 2018-04-16 PROCEDURE — G0277 HBOT, FULL BODY CHAMBER, 30M: HCPCS

## 2018-04-16 RX ORDER — 0.9 % SODIUM CHLORIDE 0.9 %
10 VIAL (ML) INJECTION PRN
Status: CANCELLED | OUTPATIENT
Start: 2018-04-16

## 2018-04-16 RX ADMIN — CEFTRIAXONE SODIUM 2 G: 2 INJECTION, POWDER, FOR SOLUTION INTRAMUSCULAR; INTRAVENOUS at 10:18

## 2018-04-16 ASSESSMENT — PAIN SCALES - GENERAL
PAINLEVEL_OUTOF10: 0
PAINLEVEL_OUTOF10: 0

## 2018-04-16 NOTE — OP NOTE
Patient head dressing changed per order. Both spots on posterior head appear to be draining thick yellow mucopurulent discharge. Patient denies pain or discomfort.

## 2018-04-17 ENCOUNTER — HOSPITAL ENCOUNTER (OUTPATIENT)
Dept: HYPERBARIC MEDICINE | Age: 78
Discharge: HOME | End: 2018-04-17
Payer: MEDICARE

## 2018-04-17 ENCOUNTER — HOSPITAL ENCOUNTER (OUTPATIENT)
Dept: GENERAL RADIOLOGY | Age: 78
Discharge: HOME | End: 2018-04-17
Payer: MEDICARE

## 2018-04-17 VITALS
RESPIRATION RATE: 16 BRPM | OXYGEN SATURATION: 98 % | TEMPERATURE: 97.8 F | SYSTOLIC BLOOD PRESSURE: 124 MMHG | HEART RATE: 65 BPM | DIASTOLIC BLOOD PRESSURE: 59 MMHG

## 2018-04-17 VITALS
DIASTOLIC BLOOD PRESSURE: 69 MMHG | RESPIRATION RATE: 16 BRPM | SYSTOLIC BLOOD PRESSURE: 146 MMHG | HEART RATE: 65 BPM | OXYGEN SATURATION: 100 % | TEMPERATURE: 95.7 F

## 2018-04-17 DIAGNOSIS — I96 SKIN FLAP NECROSIS (HCC): ICD-10-CM

## 2018-04-17 DIAGNOSIS — L03.811 CELLULITIS OF SCALP: ICD-10-CM

## 2018-04-17 DIAGNOSIS — T86.828 SKIN FLAP NECROSIS (HCC): ICD-10-CM

## 2018-04-17 PROCEDURE — G0277 HBOT, FULL BODY CHAMBER, 30M: HCPCS

## 2018-04-17 PROCEDURE — 2580000003 HC RX 258: Performed by: NURSE PRACTITIONER

## 2018-04-17 PROCEDURE — 6360000002 HC RX W HCPCS: Performed by: NURSE PRACTITIONER

## 2018-04-17 PROCEDURE — 96365 THER/PROPH/DIAG IV INF INIT: CPT

## 2018-04-17 RX ORDER — 0.9 % SODIUM CHLORIDE 0.9 %
10 VIAL (ML) INJECTION PRN
Status: CANCELLED | OUTPATIENT
Start: 2018-04-17

## 2018-04-17 RX ORDER — 0.9 % SODIUM CHLORIDE 0.9 %
10 VIAL (ML) INJECTION PRN
Status: DISCONTINUED | OUTPATIENT
Start: 2018-04-17 | End: 2018-04-18 | Stop reason: HOSPADM

## 2018-04-17 RX ADMIN — Medication 10 ML: at 10:42

## 2018-04-17 RX ADMIN — Medication 10 ML: at 10:13

## 2018-04-17 RX ADMIN — CEFTRIAXONE SODIUM 2 G: 2 INJECTION, POWDER, FOR SOLUTION INTRAMUSCULAR; INTRAVENOUS at 10:15

## 2018-04-17 ASSESSMENT — PAIN SCALES - GENERAL
PAINLEVEL_OUTOF10: 0
PAINLEVEL_OUTOF10: 0

## 2018-04-17 NOTE — PROGRESS NOTES
__met__ Safety   GOAL: Patient will have ID or Allergy band on in the Urgent Care       (Environmental)   [place] to environment   Ensure ID band is correct and in place/ allergy band as needed   Assess for fall risk   Initiate fall precautions as applicable (fall band, side rails, etc.)   Call light within reach   Bed in low position/ wheels locked    __met__ Pain   GOAL:  Patient pain will be under control while here in the Urgent Care      Assess pain level and characteristics   Administer analgesics as ordered   Assess effectiveness of pain management and report to MD as needed    _met__ Knowledge Deficit:   GOAL: Patient will be educated on the medication they are receiving here in the Urgent Care   Assess baseline knowledge   Provide teaching at level of understanding   Provide teaching via preferred learning method   Evaluate teaching effectiveness    __met__ Hemodynamic/Respiratory Status:   GOAL: Patient vital signs will be assessed and monitored in the Urgent Care       (Pre and Post Procedure Monitoring)   Assess/Monitor vital signs and LOC   Assess Baseline SpO2 prior to any sedation   Obtain weight/height   Assess vital signs/ LOC until patient meets discharge criteria   Monitor procedure site and notify MD of any issues    __met__ Infection-Risk of Central Venous Catheter:   GOAL: Patient will be monitored for infection while in the Urgent Care   Monitor for infection signs and symptoms (catheter site redness, temperature elevation, etc)   Assess for infection risks   Educate regarding infection prevention   Manage central venous catheter (flushes/ dressing changes per protocol)    CARE PLAN END DATE: 04/17/2018

## 2018-04-17 NOTE — PROGRESS NOTES
IV infusion completed. Tolerated well. Vitals obtained, stable. Denies needs or concerns. Getting dressing changed at Dr Maurice Mcgraw office today. Ambulatory to exit with no problems.

## 2018-04-17 NOTE — PROGRESS NOTES
Ambulatory to [facility] needing IV antibiotic infusion. Vitals obtained, stable. Denies needs or concerns. Pt states he is going to see Dr To Chapa this afternoon and he will take the dressing off there and change it. Requests to have dressing change done there today. No other complaints. Medicated as ordered.

## 2018-04-18 ENCOUNTER — HOSPITAL ENCOUNTER (OUTPATIENT)
Dept: HYPERBARIC MEDICINE | Age: 78
Discharge: HOME | End: 2018-04-18
Payer: MEDICARE

## 2018-04-18 ENCOUNTER — HOSPITAL ENCOUNTER (OUTPATIENT)
Dept: GENERAL RADIOLOGY | Age: 78
Discharge: HOME | End: 2018-04-18
Payer: MEDICARE

## 2018-04-18 VITALS
TEMPERATURE: 96.3 F | SYSTOLIC BLOOD PRESSURE: 147 MMHG | HEART RATE: 65 BPM | DIASTOLIC BLOOD PRESSURE: 67 MMHG | RESPIRATION RATE: 16 BRPM | OXYGEN SATURATION: 100 %

## 2018-04-18 VITALS
DIASTOLIC BLOOD PRESSURE: 67 MMHG | OXYGEN SATURATION: 97 % | TEMPERATURE: 97.6 F | HEART RATE: 71 BPM | SYSTOLIC BLOOD PRESSURE: 123 MMHG | RESPIRATION RATE: 16 BRPM

## 2018-04-18 DIAGNOSIS — T86.828 SKIN FLAP NECROSIS (HCC): ICD-10-CM

## 2018-04-18 DIAGNOSIS — L03.811 CELLULITIS OF SCALP: ICD-10-CM

## 2018-04-18 DIAGNOSIS — I96 SKIN FLAP NECROSIS (HCC): ICD-10-CM

## 2018-04-18 PROCEDURE — 2580000003 HC RX 258: Performed by: NURSE PRACTITIONER

## 2018-04-18 PROCEDURE — 6360000002 HC RX W HCPCS: Performed by: NURSE PRACTITIONER

## 2018-04-18 PROCEDURE — G0277 HBOT, FULL BODY CHAMBER, 30M: HCPCS

## 2018-04-18 PROCEDURE — 96365 THER/PROPH/DIAG IV INF INIT: CPT

## 2018-04-18 PROCEDURE — G0463 HOSPITAL OUTPT CLINIC VISIT: HCPCS

## 2018-04-18 RX ORDER — 0.9 % SODIUM CHLORIDE 0.9 %
10 VIAL (ML) INJECTION PRN
Status: CANCELLED | OUTPATIENT
Start: 2018-04-18

## 2018-04-18 RX ORDER — 0.9 % SODIUM CHLORIDE 0.9 %
10 VIAL (ML) INJECTION PRN
Status: DISCONTINUED | OUTPATIENT
Start: 2018-04-18 | End: 2018-04-19 | Stop reason: HOSPADM

## 2018-04-18 RX ADMIN — Medication 10 ML: at 10:04

## 2018-04-18 RX ADMIN — Medication 10 ML: at 10:37

## 2018-04-18 RX ADMIN — CEFTRIAXONE SODIUM 2 G: 2 INJECTION, POWDER, FOR SOLUTION INTRAMUSCULAR; INTRAVENOUS at 10:04

## 2018-04-18 ASSESSMENT — PAIN SCALES - GENERAL
PAINLEVEL_OUTOF10: 0
PAINLEVEL_OUTOF10: 0

## 2018-04-18 NOTE — PROGRESS NOTES
__met__ Safety   GOAL: Patient will have ID or Allergy band on in the Urgent Care       (Environmental)   [place] to environment   Ensure ID band is correct and in place/ allergy band as needed   Assess for fall risk   Initiate fall precautions as applicable (fall band, side rails, etc.)   Call light within reach   Bed in low position/ wheels locked    __met__ Pain   GOAL:  Patient pain will be under control while here in the Urgent Care      Assess pain level and characteristics   Administer analgesics as ordered   Assess effectiveness of pain management and report to MD as needed    _met__ Knowledge Deficit:   GOAL: Patient will be educated on the medication they are receiving here in the Urgent Care   Assess baseline knowledge   Provide teaching at level of understanding   Provide teaching via preferred learning method   Evaluate teaching effectiveness    __met__ Hemodynamic/Respiratory Status:   GOAL: Patient vital signs will be assessed and monitored in the Urgent Care       (Pre and Post Procedure Monitoring)   Assess/Monitor vital signs and LOC   Assess Baseline SpO2 prior to any sedation   Obtain weight/height   Assess vital signs/ LOC until patient meets discharge criteria   Monitor procedure site and notify MD of any issues    __met__ Infection-Risk of Central Venous Catheter:   GOAL: Patient will be monitored for infection while in the Urgent Care   Monitor for infection signs and symptoms (catheter site redness, temperature elevation, etc)   Assess for infection risks   Educate regarding infection prevention   Manage central venous catheter (flushes/ dressing changes per protocol)    CARE PLAN END DATE:

## 2018-04-18 NOTE — PROGRESS NOTES
Scalp dressing changed without difficulty. Small amount of sero-sanguinous drainage noted. Frontal areas painted with betadine and adaptic replaced. Covered with 4x4's and wrapped with kerlix. Pt tolerated well without pain.

## 2018-04-19 ENCOUNTER — HOSPITAL ENCOUNTER (OUTPATIENT)
Dept: GENERAL RADIOLOGY | Age: 78
Discharge: HOME | End: 2018-04-19
Payer: MEDICARE

## 2018-04-19 ENCOUNTER — HOSPITAL ENCOUNTER (OUTPATIENT)
Dept: HYPERBARIC MEDICINE | Age: 78
Discharge: HOME | End: 2018-04-19
Payer: MEDICARE

## 2018-04-19 VITALS
HEART RATE: 60 BPM | RESPIRATION RATE: 16 BRPM | SYSTOLIC BLOOD PRESSURE: 144 MMHG | TEMPERATURE: 96.2 F | DIASTOLIC BLOOD PRESSURE: 69 MMHG | OXYGEN SATURATION: 99 %

## 2018-04-19 VITALS
OXYGEN SATURATION: 99 % | SYSTOLIC BLOOD PRESSURE: 116 MMHG | RESPIRATION RATE: 16 BRPM | DIASTOLIC BLOOD PRESSURE: 69 MMHG | TEMPERATURE: 96.8 F | HEART RATE: 68 BPM

## 2018-04-19 DIAGNOSIS — I96 SKIN FLAP NECROSIS (HCC): ICD-10-CM

## 2018-04-19 DIAGNOSIS — T86.828 SKIN FLAP NECROSIS (HCC): ICD-10-CM

## 2018-04-19 DIAGNOSIS — L03.811 CELLULITIS OF SCALP: ICD-10-CM

## 2018-04-19 PROCEDURE — 2580000003 HC RX 258: Performed by: NURSE PRACTITIONER

## 2018-04-19 PROCEDURE — 6360000002 HC RX W HCPCS: Performed by: NURSE PRACTITIONER

## 2018-04-19 PROCEDURE — 99183 HYPERBARIC OXYGEN THERAPY: CPT | Performed by: NURSE PRACTITIONER

## 2018-04-19 PROCEDURE — G0277 HBOT, FULL BODY CHAMBER, 30M: HCPCS

## 2018-04-19 PROCEDURE — 96365 THER/PROPH/DIAG IV INF INIT: CPT

## 2018-04-19 PROCEDURE — G0463 HOSPITAL OUTPT CLINIC VISIT: HCPCS

## 2018-04-19 RX ORDER — 0.9 % SODIUM CHLORIDE 0.9 %
10 VIAL (ML) INJECTION PRN
Status: DISCONTINUED | OUTPATIENT
Start: 2018-04-19 | End: 2018-04-20 | Stop reason: HOSPADM

## 2018-04-19 RX ORDER — 0.9 % SODIUM CHLORIDE 0.9 %
10 VIAL (ML) INJECTION PRN
Status: CANCELLED | OUTPATIENT
Start: 2018-04-19

## 2018-04-19 RX ADMIN — Medication 10 ML: at 10:16

## 2018-04-19 RX ADMIN — Medication 10 ML: at 10:46

## 2018-04-19 RX ADMIN — CEFTRIAXONE SODIUM 2 G: 2 INJECTION, POWDER, FOR SOLUTION INTRAMUSCULAR; INTRAVENOUS at 10:16

## 2018-04-19 ASSESSMENT — PAIN SCALES - GENERAL
PAINLEVEL_OUTOF10: 0
PAINLEVEL_OUTOF10: 0

## 2018-04-19 NOTE — OP NOTE
Patients medications would not mix correctly and draw back into the D5W bag. Tubing created to many air bubbles to start infusion. Medications and tubing was discarded and started over without incident.

## 2018-04-19 NOTE — PROGRESS NOTES
__met__ Safety   GOAL: Patient will have ID or Allergy band on in the Urgent Care       (Environmental)   [place] to environment   Ensure ID band is correct and in place/ allergy band as needed   Assess for fall risk   Initiate fall precautions as applicable (fall band, side rails, etc.)   Call light within reach   Bed in low position/ wheels locked    __met__ Pain   GOAL:  Patient pain will be under control while here in the Urgent Care      Assess pain level and characteristics   Administer analgesics as ordered   Assess effectiveness of pain management and report to MD as needed    _met__ Knowledge Deficit:   GOAL: Patient will be educated on the medication they are receiving here in the Urgent Care   Assess baseline knowledge   Provide teaching at level of understanding   Provide teaching via preferred learning method   Evaluate teaching effectiveness    __met__ Hemodynamic/Respiratory Status:   GOAL: Patient vital signs will be assessed and monitored in the Urgent Care       (Pre and Post Procedure Monitoring)   Assess/Monitor vital signs and LOC   Assess Baseline SpO2 prior to any sedation   Obtain weight/height   Assess vital signs/ LOC until patient meets discharge criteria   Monitor procedure site and notify MD of any issues    __met__ Infection-Risk of Central Venous Catheter:   GOAL: Patient will be monitored for infection while in the Urgent Care   Monitor for infection signs and symptoms (catheter site redness, temperature elevation, etc)   Assess for infection risks   Educate regarding infection prevention   Manage central venous catheter (flushes/ dressing changes per protocol)    CARE PLAN END DATE:

## 2018-04-19 NOTE — PROGRESS NOTES
Scalp dressing removed. Scant amount of yellowish drainage noted. Wounds painted with betadine and covered with adaptic and 4x4's. Wrapped with kerlix.

## 2018-04-20 ENCOUNTER — HOSPITAL ENCOUNTER (OUTPATIENT)
Dept: GENERAL RADIOLOGY | Age: 78
Discharge: HOME | End: 2018-04-20
Payer: MEDICARE

## 2018-04-20 ENCOUNTER — HOSPITAL ENCOUNTER (OUTPATIENT)
Dept: HYPERBARIC MEDICINE | Age: 78
Discharge: HOME | End: 2018-04-20
Payer: MEDICARE

## 2018-04-20 VITALS
SYSTOLIC BLOOD PRESSURE: 148 MMHG | OXYGEN SATURATION: 100 % | DIASTOLIC BLOOD PRESSURE: 67 MMHG | HEART RATE: 64 BPM | RESPIRATION RATE: 16 BRPM | TEMPERATURE: 95.9 F

## 2018-04-20 VITALS
TEMPERATURE: 96.8 F | SYSTOLIC BLOOD PRESSURE: 122 MMHG | DIASTOLIC BLOOD PRESSURE: 58 MMHG | HEART RATE: 62 BPM | OXYGEN SATURATION: 98 % | RESPIRATION RATE: 14 BRPM

## 2018-04-20 DIAGNOSIS — L03.811 CELLULITIS OF SCALP: ICD-10-CM

## 2018-04-20 DIAGNOSIS — I96 SKIN FLAP NECROSIS (HCC): ICD-10-CM

## 2018-04-20 DIAGNOSIS — T86.828 SKIN FLAP NECROSIS (HCC): ICD-10-CM

## 2018-04-20 LAB
ALBUMIN SERPL-MCNC: 3.7 G/DL (ref 3.5–5.1)
ALP BLD-CCNC: 90 U/L (ref 38–126)
ALT SERPL-CCNC: 8 U/L (ref 11–66)
ANION GAP SERPL CALCULATED.3IONS-SCNC: 12 MEQ/L (ref 8–16)
AST SERPL-CCNC: 19 U/L (ref 5–40)
BILIRUB SERPL-MCNC: 0.3 MG/DL (ref 0.3–1.2)
BUN BLDV-MCNC: 19 MG/DL (ref 7–22)
CALCIUM SERPL-MCNC: 8.7 MG/DL (ref 8.5–10.5)
CHLORIDE BLD-SCNC: 101 MEQ/L (ref 98–111)
CO2: 25 MEQ/L (ref 23–33)
CREAT SERPL-MCNC: 0.8 MG/DL (ref 0.4–1.2)
GFR SERPL CREATININE-BSD FRML MDRD: > 90 ML/MIN/1.73M2
GLUCOSE BLD-MCNC: 145 MG/DL (ref 70–108)
HCT VFR BLD CALC: 34.9 % (ref 42–52)
HEMOGLOBIN: 11.9 GM/DL (ref 14–18)
MCH RBC QN AUTO: 34.6 PG (ref 27–31)
MCHC RBC AUTO-ENTMCNC: 34.2 GM/DL (ref 33–37)
MCV RBC AUTO: 101.4 FL (ref 80–94)
PDW BLD-RTO: 13.4 % (ref 11.5–14.5)
PLATELET # BLD: 227 THOU/MM3 (ref 130–400)
PMV BLD AUTO: 8.5 FL (ref 7.4–10.4)
POTASSIUM SERPL-SCNC: 4.4 MEQ/L (ref 3.5–5.2)
RBC # BLD: 3.44 MILL/MM3 (ref 4.7–6.1)
SODIUM BLD-SCNC: 138 MEQ/L (ref 135–145)
TOTAL PROTEIN: 6.4 G/DL (ref 6.1–8)
WBC # BLD: 6.9 THOU/MM3 (ref 4.8–10.8)

## 2018-04-20 PROCEDURE — 6360000002 HC RX W HCPCS: Performed by: NURSE PRACTITIONER

## 2018-04-20 PROCEDURE — 36415 COLL VENOUS BLD VENIPUNCTURE: CPT

## 2018-04-20 PROCEDURE — 2580000003 HC RX 258: Performed by: NURSE PRACTITIONER

## 2018-04-20 PROCEDURE — 85027 COMPLETE CBC AUTOMATED: CPT

## 2018-04-20 PROCEDURE — 96365 THER/PROPH/DIAG IV INF INIT: CPT

## 2018-04-20 PROCEDURE — 80053 COMPREHEN METABOLIC PANEL: CPT

## 2018-04-20 PROCEDURE — G0277 HBOT, FULL BODY CHAMBER, 30M: HCPCS

## 2018-04-20 RX ORDER — 0.9 % SODIUM CHLORIDE 0.9 %
10 VIAL (ML) INJECTION PRN
Status: DISCONTINUED | OUTPATIENT
Start: 2018-04-20 | End: 2018-04-21 | Stop reason: HOSPADM

## 2018-04-20 RX ORDER — 0.9 % SODIUM CHLORIDE 0.9 %
10 VIAL (ML) INJECTION PRN
Status: CANCELLED | OUTPATIENT
Start: 2018-04-20

## 2018-04-20 RX ADMIN — Medication 10 ML: at 10:07

## 2018-04-20 RX ADMIN — CEFTRIAXONE SODIUM 2 G: 2 INJECTION, POWDER, FOR SOLUTION INTRAMUSCULAR; INTRAVENOUS at 10:08

## 2018-04-20 RX ADMIN — Medication 10 ML: at 10:43

## 2018-04-20 ASSESSMENT — PAIN SCALES - GENERAL
PAINLEVEL_OUTOF10: 0
PAINLEVEL_OUTOF10: 0

## 2018-04-20 NOTE — PROGRESS NOTES
__met__ Safety   GOAL: Patient will have ID or Allergy band on in the Urgent Care       (Environmental)   [place] to environment   Ensure ID band is correct and in place/ allergy band as needed   Assess for fall risk   Initiate fall precautions as applicable (fall band, side rails, etc.)   Call light within reach   Bed in low position/ wheels locked    __met__ Pain   GOAL:  Patient pain will be under control while here in the Urgent Care      Assess pain level and characteristics   Administer analgesics as ordered   Assess effectiveness of pain management and report to MD as needed    _met__ Knowledge Deficit:   GOAL: Patient will be educated on the medication they are receiving here in the Urgent Care   Assess baseline knowledge   Provide teaching at level of understanding   Provide teaching via preferred learning method   Evaluate teaching effectiveness    __met__ Hemodynamic/Respiratory Status:   GOAL: Patient vital signs will be assessed and monitored in the Urgent Care       (Pre and Post Procedure Monitoring)   Assess/Monitor vital signs and LOC   Assess Baseline SpO2 prior to any sedation   Obtain weight/height   Assess vital signs/ LOC until patient meets discharge criteria   Monitor procedure site and notify MD of any issues    __met__ Infection-Risk of Central Venous Catheter:   GOAL: Patient will be monitored for infection while in the Urgent Care   Monitor for infection signs and symptoms (catheter site redness, temperature elevation, etc)   Assess for infection risks   Educate regarding infection prevention   Manage central venous catheter (flushes/ dressing changes per protocol)    CARE PLAN END DATE:

## 2018-04-21 ENCOUNTER — HOSPITAL ENCOUNTER (OUTPATIENT)
Dept: GENERAL RADIOLOGY | Age: 78
Discharge: HOME | End: 2018-04-21
Payer: MEDICARE

## 2018-04-21 VITALS
SYSTOLIC BLOOD PRESSURE: 126 MMHG | HEIGHT: 70 IN | TEMPERATURE: 97.7 F | RESPIRATION RATE: 16 BRPM | WEIGHT: 193 LBS | OXYGEN SATURATION: 96 % | BODY MASS INDEX: 27.63 KG/M2 | HEART RATE: 83 BPM | DIASTOLIC BLOOD PRESSURE: 59 MMHG

## 2018-04-21 DIAGNOSIS — L03.811 CELLULITIS OF SCALP: ICD-10-CM

## 2018-04-21 PROCEDURE — 6360000002 HC RX W HCPCS: Performed by: NURSE PRACTITIONER

## 2018-04-21 PROCEDURE — A6402 STERILE GAUZE <= 16 SQ IN: HCPCS

## 2018-04-21 PROCEDURE — 96365 THER/PROPH/DIAG IV INF INIT: CPT

## 2018-04-21 PROCEDURE — G0463 HOSPITAL OUTPT CLINIC VISIT: HCPCS

## 2018-04-21 PROCEDURE — 2580000003 HC RX 258: Performed by: NURSE PRACTITIONER

## 2018-04-21 RX ORDER — 0.9 % SODIUM CHLORIDE 0.9 %
10 VIAL (ML) INJECTION PRN
Status: CANCELLED | OUTPATIENT
Start: 2018-04-21

## 2018-04-21 RX ORDER — 0.9 % SODIUM CHLORIDE 0.9 %
10 VIAL (ML) INJECTION PRN
Status: DISCONTINUED | OUTPATIENT
Start: 2018-04-21 | End: 2018-04-22 | Stop reason: HOSPADM

## 2018-04-21 RX ADMIN — CEFTRIAXONE SODIUM 2 G: 2 INJECTION, POWDER, FOR SOLUTION INTRAMUSCULAR; INTRAVENOUS at 14:30

## 2018-04-21 RX ADMIN — Medication 10 ML: at 15:03

## 2018-04-21 RX ADMIN — Medication 10 ML: at 14:30

## 2018-04-21 NOTE — PROGRESS NOTES
Scalp dressing removed small amount of greenish yellow drainage noted. Betadine applied covered with adaptic and 4x4's wrapped with kerlix.

## 2018-04-21 NOTE — PROGRESS NOTES
Infusion complete pt discharged to home ambulatory stable with wife. Vitals remain within normal limits. No concerns voiced at this time.

## 2018-04-21 NOTE — PROGRESS NOTES
_Met__ Safety   GOAL: Patient will not experience fall during visit. (Environmental)   [place] to environment  [employer] ID band is correct and in place/ allergy band as needed   Assess for fall risk   Initiate fall precautions as applicable (fall band, side rails, etc.)   Call light within reach   Bed in low position/ wheels locked    __Met__ Pain   GOAL:   Patient will verbalize controlled pain.  Assess pain level and characteristics   Administer analgesics as ordered   Assess effectiveness of pain management and report to MD as needed    __Met__ Knowledge Deficit:   GOAL:  Patient will verbalize understanding of therapy plan.  Assess baseline knowledge   Provide teaching at level of understanding   Provide teaching via preferred learning method   Evaluate teaching effectiveness    __Met__ Hemodynamic/Respiratory Status:   GOAL:  Vital signs remain within normal limits prior and post treatment. (Pre and Post Procedure Monitoring)   Assess/Monitor vital signs and LOC   Assess Baseline SpO2 prior to any sedation   Obtain weight/height   Assess vital signs/ LOC until patient meets discharge criteria   Monitor procedure site and notify MD of any issues    __Met__ Infection-Risk of Central Venous Catheter:   GOAL:  Prevent infection to PICC line.     Monitor for infection signs and symptoms (catheter site redness, temperature elevation, etc)   Assess for infection risks   Educate regarding infection prevention   Manage central venous catheter (flushes/ dressing changes per protocol)    CARE PLAN END DATE:

## 2018-04-22 ENCOUNTER — HOSPITAL ENCOUNTER (OUTPATIENT)
Dept: GENERAL RADIOLOGY | Age: 78
Discharge: HOME | End: 2018-04-22
Payer: MEDICARE

## 2018-04-22 VITALS
RESPIRATION RATE: 18 BRPM | DIASTOLIC BLOOD PRESSURE: 64 MMHG | TEMPERATURE: 97.6 F | SYSTOLIC BLOOD PRESSURE: 124 MMHG | HEART RATE: 74 BPM | OXYGEN SATURATION: 97 %

## 2018-04-22 DIAGNOSIS — L03.811 CELLULITIS OF SCALP: ICD-10-CM

## 2018-04-22 PROCEDURE — 6360000002 HC RX W HCPCS: Performed by: NURSE PRACTITIONER

## 2018-04-22 PROCEDURE — G0463 HOSPITAL OUTPT CLINIC VISIT: HCPCS

## 2018-04-22 PROCEDURE — 96365 THER/PROPH/DIAG IV INF INIT: CPT

## 2018-04-22 PROCEDURE — 2580000003 HC RX 258: Performed by: NURSE PRACTITIONER

## 2018-04-22 RX ORDER — 0.9 % SODIUM CHLORIDE 0.9 %
10 VIAL (ML) INJECTION PRN
Status: DISCONTINUED | OUTPATIENT
Start: 2018-04-22 | End: 2018-04-23 | Stop reason: HOSPADM

## 2018-04-22 RX ORDER — 0.9 % SODIUM CHLORIDE 0.9 %
10 VIAL (ML) INJECTION PRN
Status: CANCELLED | OUTPATIENT
Start: 2018-04-22

## 2018-04-22 RX ADMIN — CEFTRIAXONE SODIUM 2 G: 2 INJECTION, POWDER, FOR SOLUTION INTRAMUSCULAR; INTRAVENOUS at 10:41

## 2018-04-22 NOTE — OP NOTE
Dressing Change per order. Wound is clean and fleshy little drainage. Patient denies pain or discomfort at this time.

## 2018-04-22 NOTE — OP NOTE
Patient infusion complete, line flushed and capped per order. Patient denies pain or discomfort at this time. Discharged in stable condition.

## 2018-04-23 ENCOUNTER — HOSPITAL ENCOUNTER (OUTPATIENT)
Dept: HYPERBARIC MEDICINE | Age: 78
Discharge: HOME | End: 2018-04-23
Payer: MEDICARE

## 2018-04-23 ENCOUNTER — HOSPITAL ENCOUNTER (OUTPATIENT)
Dept: GENERAL RADIOLOGY | Age: 78
Discharge: HOME | End: 2018-04-23
Payer: MEDICARE

## 2018-04-23 VITALS
RESPIRATION RATE: 18 BRPM | SYSTOLIC BLOOD PRESSURE: 149 MMHG | DIASTOLIC BLOOD PRESSURE: 65 MMHG | TEMPERATURE: 97.3 F | OXYGEN SATURATION: 95 % | HEART RATE: 73 BPM

## 2018-04-23 VITALS
RESPIRATION RATE: 16 BRPM | OXYGEN SATURATION: 100 % | TEMPERATURE: 96 F | DIASTOLIC BLOOD PRESSURE: 66 MMHG | SYSTOLIC BLOOD PRESSURE: 153 MMHG | HEART RATE: 66 BPM

## 2018-04-23 DIAGNOSIS — I96 SKIN FLAP NECROSIS (HCC): ICD-10-CM

## 2018-04-23 DIAGNOSIS — L03.811 CELLULITIS OF SCALP: ICD-10-CM

## 2018-04-23 DIAGNOSIS — T86.828 SKIN FLAP NECROSIS (HCC): ICD-10-CM

## 2018-04-23 PROCEDURE — 2580000003 HC RX 258: Performed by: NURSE PRACTITIONER

## 2018-04-23 PROCEDURE — G0463 HOSPITAL OUTPT CLINIC VISIT: HCPCS

## 2018-04-23 PROCEDURE — 96365 THER/PROPH/DIAG IV INF INIT: CPT

## 2018-04-23 PROCEDURE — 6360000002 HC RX W HCPCS: Performed by: NURSE PRACTITIONER

## 2018-04-23 PROCEDURE — G0277 HBOT, FULL BODY CHAMBER, 30M: HCPCS

## 2018-04-23 RX ORDER — 0.9 % SODIUM CHLORIDE 0.9 %
10 VIAL (ML) INJECTION PRN
Status: DISCONTINUED | OUTPATIENT
Start: 2018-04-23 | End: 2018-04-24 | Stop reason: HOSPADM

## 2018-04-23 RX ORDER — 0.9 % SODIUM CHLORIDE 0.9 %
10 VIAL (ML) INJECTION PRN
Status: CANCELLED | OUTPATIENT
Start: 2018-04-23

## 2018-04-23 RX ADMIN — CEFTRIAXONE SODIUM 2 G: 2 INJECTION, POWDER, FOR SOLUTION INTRAMUSCULAR; INTRAVENOUS at 10:21

## 2018-04-23 ASSESSMENT — PAIN SCALES - GENERAL
PAINLEVEL_OUTOF10: 0
PAINLEVEL_OUTOF10: 0

## 2018-04-24 ENCOUNTER — HOSPITAL ENCOUNTER (OUTPATIENT)
Dept: HYPERBARIC MEDICINE | Age: 78
Discharge: HOME | End: 2018-04-24
Payer: MEDICARE

## 2018-04-24 ENCOUNTER — HOSPITAL ENCOUNTER (OUTPATIENT)
Dept: GENERAL RADIOLOGY | Age: 78
Discharge: HOME | End: 2018-04-24
Payer: MEDICARE

## 2018-04-24 VITALS
HEART RATE: 68 BPM | RESPIRATION RATE: 18 BRPM | DIASTOLIC BLOOD PRESSURE: 61 MMHG | TEMPERATURE: 96.6 F | SYSTOLIC BLOOD PRESSURE: 136 MMHG | OXYGEN SATURATION: 99 %

## 2018-04-24 VITALS
TEMPERATURE: 95.1 F | DIASTOLIC BLOOD PRESSURE: 70 MMHG | HEART RATE: 64 BPM | RESPIRATION RATE: 18 BRPM | OXYGEN SATURATION: 99 % | SYSTOLIC BLOOD PRESSURE: 146 MMHG

## 2018-04-24 DIAGNOSIS — L03.811 CELLULITIS OF SCALP: ICD-10-CM

## 2018-04-24 LAB
ALBUMIN SERPL-MCNC: 3.5 G/DL (ref 3.5–5.1)
ALP BLD-CCNC: 90 U/L (ref 38–126)
ALT SERPL-CCNC: 6 U/L (ref 11–66)
ANION GAP SERPL CALCULATED.3IONS-SCNC: 9 MEQ/L (ref 8–16)
AST SERPL-CCNC: 17 U/L (ref 5–40)
BILIRUB SERPL-MCNC: 0.2 MG/DL (ref 0.3–1.2)
BUN BLDV-MCNC: 18 MG/DL (ref 7–22)
CALCIUM SERPL-MCNC: 9 MG/DL (ref 8.5–10.5)
CHLORIDE BLD-SCNC: 103 MEQ/L (ref 98–111)
CO2: 26 MEQ/L (ref 23–33)
CREAT SERPL-MCNC: 0.7 MG/DL (ref 0.4–1.2)
GFR SERPL CREATININE-BSD FRML MDRD: > 90 ML/MIN/1.73M2
GLUCOSE BLD-MCNC: 132 MG/DL (ref 70–108)
HCT VFR BLD CALC: 34.3 % (ref 42–52)
HEMOGLOBIN: 11.8 GM/DL (ref 14–18)
MCH RBC QN AUTO: 34.6 PG (ref 27–31)
MCHC RBC AUTO-ENTMCNC: 34.4 GM/DL (ref 33–37)
MCV RBC AUTO: 100.6 FL (ref 80–94)
PDW BLD-RTO: 13.3 % (ref 11.5–14.5)
PLATELET # BLD: 215 THOU/MM3 (ref 130–400)
PMV BLD AUTO: 8.5 FL (ref 7.4–10.4)
POTASSIUM SERPL-SCNC: 4.3 MEQ/L (ref 3.5–5.2)
RBC # BLD: 3.41 MILL/MM3 (ref 4.7–6.1)
SODIUM BLD-SCNC: 138 MEQ/L (ref 135–145)
TOTAL PROTEIN: 6.5 G/DL (ref 6.1–8)
WBC # BLD: 6.5 THOU/MM3 (ref 4.8–10.8)

## 2018-04-24 PROCEDURE — 80053 COMPREHEN METABOLIC PANEL: CPT

## 2018-04-24 PROCEDURE — 96365 THER/PROPH/DIAG IV INF INIT: CPT

## 2018-04-24 PROCEDURE — 36415 COLL VENOUS BLD VENIPUNCTURE: CPT

## 2018-04-24 PROCEDURE — G0277 HBOT, FULL BODY CHAMBER, 30M: HCPCS

## 2018-04-24 PROCEDURE — 6360000002 HC RX W HCPCS: Performed by: NURSE PRACTITIONER

## 2018-04-24 PROCEDURE — G0463 HOSPITAL OUTPT CLINIC VISIT: HCPCS

## 2018-04-24 PROCEDURE — 2580000003 HC RX 258: Performed by: NURSE PRACTITIONER

## 2018-04-24 PROCEDURE — 85027 COMPLETE CBC AUTOMATED: CPT

## 2018-04-24 RX ORDER — 0.9 % SODIUM CHLORIDE 0.9 %
10 VIAL (ML) INJECTION PRN
Status: CANCELLED | OUTPATIENT
Start: 2018-04-24

## 2018-04-24 RX ORDER — 0.9 % SODIUM CHLORIDE 0.9 %
10 VIAL (ML) INJECTION PRN
Status: DISCONTINUED | OUTPATIENT
Start: 2018-04-24 | End: 2018-04-25 | Stop reason: HOSPADM

## 2018-04-24 RX ADMIN — Medication 10 ML: at 11:00

## 2018-04-24 RX ADMIN — Medication 10 ML: at 10:18

## 2018-04-24 RX ADMIN — Medication: at 10:51

## 2018-04-24 RX ADMIN — CEFTRIAXONE SODIUM 2 G: 2 INJECTION, POWDER, FOR SOLUTION INTRAMUSCULAR; INTRAVENOUS at 10:20

## 2018-04-24 ASSESSMENT — PAIN SCALES - GENERAL: PAINLEVEL_OUTOF10: 0

## 2018-04-24 NOTE — PROGRESS NOTES
No adverse medication reaction noted with meds given. No dressing change, pt states that he has an appointment with Dr. Ashleigh Whaley later today. Discharge instructions discussed with pt and pt verbalized understanding of info given. Pt left stable and ambulated to exit.

## 2018-04-24 NOTE — PROGRESS NOTES
__met__ Safety   GOAL: Patient will have ID or Allergy band on in the Urgent Care       (Environmental)   [place] to environment   Ensure ID band is correct and in place/ allergy band as needed   Assess for fall risk   Initiate fall precautions as applicable (fall band, side rails, etc.)   Call light within reach   Bed in low position/ wheels locked    __met__ Pain   GOAL:  Patient pain will be under control while here in the Urgent Care      Assess pain level and characteristics   Administer analgesics as ordered   Assess effectiveness of pain management and report to MD as needed    _met__ Knowledge Deficit:   GOAL: Patient will be educated on the medication they are receiving here in the Urgent Care   Assess baseline knowledge   Provide teaching at level of understanding   Provide teaching via preferred learning method   Evaluate teaching effectiveness    __met__ Hemodynamic/Respiratory Status:   GOAL: Patient vital signs will be assessed and monitored in the Urgent Care       (Pre and Post Procedure Monitoring)   Assess/Monitor vital signs and LOC   Assess Baseline SpO2 prior to any sedation   Obtain weight/height   Assess vital signs/ LOC until patient meets discharge criteria   Monitor procedure site and notify MD of any issues    __met__ Infection-Risk of Central Venous Catheter:   GOAL: Patient will be monitored for infection while in the Urgent Care   Monitor for infection signs and symptoms (catheter site redness, temperature elevation, etc)   Assess for infection risks   Educate regarding infection prevention   Manage central venous catheter (flushes/ dressing changes per protocol)    CARE PLAN END DATE: 4/24/18

## 2018-04-25 ENCOUNTER — HOSPITAL ENCOUNTER (OUTPATIENT)
Dept: HYPERBARIC MEDICINE | Age: 78
Discharge: HOME | End: 2018-04-25
Payer: MEDICARE

## 2018-04-25 ENCOUNTER — HOSPITAL ENCOUNTER (OUTPATIENT)
Dept: GENERAL RADIOLOGY | Age: 78
Discharge: HOME | End: 2018-04-25
Payer: MEDICARE

## 2018-04-25 VITALS
OXYGEN SATURATION: 99 % | DIASTOLIC BLOOD PRESSURE: 58 MMHG | SYSTOLIC BLOOD PRESSURE: 130 MMHG | TEMPERATURE: 96.9 F | HEART RATE: 63 BPM | RESPIRATION RATE: 16 BRPM

## 2018-04-25 VITALS
SYSTOLIC BLOOD PRESSURE: 141 MMHG | DIASTOLIC BLOOD PRESSURE: 66 MMHG | TEMPERATURE: 96.5 F | HEART RATE: 65 BPM | OXYGEN SATURATION: 98 % | RESPIRATION RATE: 16 BRPM

## 2018-04-25 DIAGNOSIS — L03.811 CELLULITIS OF SCALP: ICD-10-CM

## 2018-04-25 DIAGNOSIS — T86.828 SKIN FLAP NECROSIS (HCC): ICD-10-CM

## 2018-04-25 DIAGNOSIS — I96 SKIN FLAP NECROSIS (HCC): ICD-10-CM

## 2018-04-25 PROCEDURE — 96365 THER/PROPH/DIAG IV INF INIT: CPT

## 2018-04-25 PROCEDURE — G0277 HBOT, FULL BODY CHAMBER, 30M: HCPCS

## 2018-04-25 PROCEDURE — G0463 HOSPITAL OUTPT CLINIC VISIT: HCPCS

## 2018-04-25 PROCEDURE — 6360000002 HC RX W HCPCS: Performed by: NURSE PRACTITIONER

## 2018-04-25 PROCEDURE — 2580000003 HC RX 258: Performed by: NURSE PRACTITIONER

## 2018-04-25 RX ORDER — 0.9 % SODIUM CHLORIDE 0.9 %
10 VIAL (ML) INJECTION PRN
Status: DISCONTINUED | OUTPATIENT
Start: 2018-04-25 | End: 2018-04-26 | Stop reason: HOSPADM

## 2018-04-25 RX ORDER — 0.9 % SODIUM CHLORIDE 0.9 %
10 VIAL (ML) INJECTION PRN
Status: CANCELLED | OUTPATIENT
Start: 2018-04-25

## 2018-04-25 RX ADMIN — CEFTRIAXONE SODIUM 2 G: 2 INJECTION, POWDER, FOR SOLUTION INTRAMUSCULAR; INTRAVENOUS at 10:13

## 2018-04-25 RX ADMIN — Medication 10 ML: at 10:45

## 2018-04-25 RX ADMIN — Medication 10 ML: at 10:13

## 2018-04-25 ASSESSMENT — PAIN SCALES - GENERAL
PAINLEVEL_OUTOF10: 0
PAINLEVEL_OUTOF10: 0

## 2018-04-25 NOTE — PROGRESS NOTES
Scalp dressing removed, scant yellowish drainage noted. Edges painted with betadine. Covered with adaptic and 4x4's and wrapped with kerlix. picc dressing changed under sterile technique.

## 2018-04-26 ENCOUNTER — HOSPITAL ENCOUNTER (OUTPATIENT)
Dept: HYPERBARIC MEDICINE | Age: 78
Discharge: HOME | End: 2018-04-26
Payer: MEDICARE

## 2018-04-26 ENCOUNTER — HOSPITAL ENCOUNTER (OUTPATIENT)
Dept: GENERAL RADIOLOGY | Age: 78
Discharge: HOME | End: 2018-04-26
Payer: MEDICARE

## 2018-04-26 VITALS
SYSTOLIC BLOOD PRESSURE: 128 MMHG | TEMPERATURE: 96.6 F | HEART RATE: 60 BPM | DIASTOLIC BLOOD PRESSURE: 60 MMHG | RESPIRATION RATE: 18 BRPM

## 2018-04-26 VITALS
HEART RATE: 61 BPM | OXYGEN SATURATION: 99 % | SYSTOLIC BLOOD PRESSURE: 142 MMHG | TEMPERATURE: 97.8 F | DIASTOLIC BLOOD PRESSURE: 63 MMHG | RESPIRATION RATE: 16 BRPM

## 2018-04-26 DIAGNOSIS — T86.828 SKIN FLAP NECROSIS (HCC): ICD-10-CM

## 2018-04-26 DIAGNOSIS — L03.811 CELLULITIS OF SCALP: ICD-10-CM

## 2018-04-26 DIAGNOSIS — I96 SKIN FLAP NECROSIS (HCC): ICD-10-CM

## 2018-04-26 LAB
ALBUMIN SERPL-MCNC: 3.7 G/DL (ref 3.5–5.1)
ALP BLD-CCNC: 92 U/L (ref 38–126)
ALT SERPL-CCNC: 7 U/L (ref 11–66)
ANION GAP SERPL CALCULATED.3IONS-SCNC: 9 MEQ/L (ref 8–16)
AST SERPL-CCNC: 16 U/L (ref 5–40)
BILIRUB SERPL-MCNC: 0.3 MG/DL (ref 0.3–1.2)
BUN BLDV-MCNC: 20 MG/DL (ref 7–22)
CALCIUM SERPL-MCNC: 8.7 MG/DL (ref 8.5–10.5)
CHLORIDE BLD-SCNC: 102 MEQ/L (ref 98–111)
CO2: 26 MEQ/L (ref 23–33)
CREAT SERPL-MCNC: 0.7 MG/DL (ref 0.4–1.2)
GFR SERPL CREATININE-BSD FRML MDRD: > 90 ML/MIN/1.73M2
GLUCOSE BLD-MCNC: 160 MG/DL (ref 70–108)
HCT VFR BLD CALC: 34.6 % (ref 42–52)
HEMOGLOBIN: 11.8 GM/DL (ref 14–18)
MCH RBC QN AUTO: 34.4 PG (ref 27–31)
MCHC RBC AUTO-ENTMCNC: 34.2 GM/DL (ref 33–37)
MCV RBC AUTO: 100.5 FL (ref 80–94)
PDW BLD-RTO: 13.9 % (ref 11.5–14.5)
PLATELET # BLD: 204 THOU/MM3 (ref 130–400)
PMV BLD AUTO: 8.2 FL (ref 7.4–10.4)
POTASSIUM SERPL-SCNC: 4.4 MEQ/L (ref 3.5–5.2)
RBC # BLD: 3.44 MILL/MM3 (ref 4.7–6.1)
SODIUM BLD-SCNC: 137 MEQ/L (ref 135–145)
TOTAL PROTEIN: 6.3 G/DL (ref 6.1–8)
WBC # BLD: 6.8 THOU/MM3 (ref 4.8–10.8)

## 2018-04-26 PROCEDURE — 96365 THER/PROPH/DIAG IV INF INIT: CPT

## 2018-04-26 PROCEDURE — 96367 TX/PROPH/DG ADDL SEQ IV INF: CPT

## 2018-04-26 PROCEDURE — G0463 HOSPITAL OUTPT CLINIC VISIT: HCPCS

## 2018-04-26 PROCEDURE — 99183 HYPERBARIC OXYGEN THERAPY: CPT | Performed by: NURSE PRACTITIONER

## 2018-04-26 PROCEDURE — 85027 COMPLETE CBC AUTOMATED: CPT

## 2018-04-26 PROCEDURE — G0277 HBOT, FULL BODY CHAMBER, 30M: HCPCS

## 2018-04-26 PROCEDURE — 2580000003 HC RX 258: Performed by: NURSE PRACTITIONER

## 2018-04-26 PROCEDURE — 36415 COLL VENOUS BLD VENIPUNCTURE: CPT

## 2018-04-26 PROCEDURE — 80053 COMPREHEN METABOLIC PANEL: CPT

## 2018-04-26 PROCEDURE — 6360000002 HC RX W HCPCS: Performed by: NURSE PRACTITIONER

## 2018-04-26 RX ORDER — 0.9 % SODIUM CHLORIDE 0.9 %
10 VIAL (ML) INJECTION PRN
Status: DISCONTINUED | OUTPATIENT
Start: 2018-04-26 | End: 2018-04-27 | Stop reason: HOSPADM

## 2018-04-26 RX ORDER — 0.9 % SODIUM CHLORIDE 0.9 %
10 VIAL (ML) INJECTION PRN
Status: CANCELLED | OUTPATIENT
Start: 2018-04-26

## 2018-04-26 RX ADMIN — CEFTRIAXONE SODIUM 2 G: 2 INJECTION, POWDER, FOR SOLUTION INTRAMUSCULAR; INTRAVENOUS at 10:18

## 2018-04-26 RX ADMIN — Medication 10 ML: at 10:17

## 2018-04-26 ASSESSMENT — PAIN SCALES - GENERAL: PAINLEVEL_OUTOF10: 0

## 2018-04-26 NOTE — PROGRESS NOTES
Pt to triage room. VS obtained. Port flushed via 10 ml ns without difficulty. Medication infusing without difficulty. Old dressing removed from pt's head with a smals amount of dried SS drainage noted to bandage. Wound bed is noted to be pink and beefy red. Pt denies any pain. Betadine applied to eschar, Adaptic dressing applied and covered in guaze. Pt tolerated well.

## 2018-04-26 NOTE — PROGRESS NOTES
Infusion completed with no adverse reaction to medications infused. Port flushed via 10 ml ns without difficulty. Swab cap applied. VS obtained.

## 2018-04-27 ENCOUNTER — HOSPITAL ENCOUNTER (OUTPATIENT)
Dept: HYPERBARIC MEDICINE | Age: 78
Discharge: HOME | End: 2018-04-27
Payer: MEDICARE

## 2018-04-27 ENCOUNTER — HOSPITAL ENCOUNTER (OUTPATIENT)
Dept: GENERAL RADIOLOGY | Age: 78
Discharge: HOME | End: 2018-04-27
Payer: MEDICARE

## 2018-04-27 VITALS
HEART RATE: 67 BPM | OXYGEN SATURATION: 100 % | TEMPERATURE: 95.4 F | SYSTOLIC BLOOD PRESSURE: 141 MMHG | DIASTOLIC BLOOD PRESSURE: 63 MMHG | RESPIRATION RATE: 16 BRPM

## 2018-04-27 VITALS
DIASTOLIC BLOOD PRESSURE: 59 MMHG | RESPIRATION RATE: 16 BRPM | OXYGEN SATURATION: 98 % | SYSTOLIC BLOOD PRESSURE: 128 MMHG | TEMPERATURE: 97.1 F | HEART RATE: 64 BPM

## 2018-04-27 DIAGNOSIS — I96 SKIN FLAP NECROSIS (HCC): ICD-10-CM

## 2018-04-27 DIAGNOSIS — T86.828 SKIN FLAP NECROSIS (HCC): ICD-10-CM

## 2018-04-27 DIAGNOSIS — L03.811 CELLULITIS OF SCALP: ICD-10-CM

## 2018-04-27 PROCEDURE — 99183 HYPERBARIC OXYGEN THERAPY: CPT | Performed by: NURSE PRACTITIONER

## 2018-04-27 PROCEDURE — 96365 THER/PROPH/DIAG IV INF INIT: CPT

## 2018-04-27 PROCEDURE — 6360000002 HC RX W HCPCS: Performed by: NURSE PRACTITIONER

## 2018-04-27 PROCEDURE — 2580000003 HC RX 258: Performed by: NURSE PRACTITIONER

## 2018-04-27 PROCEDURE — G0277 HBOT, FULL BODY CHAMBER, 30M: HCPCS

## 2018-04-27 RX ORDER — 0.9 % SODIUM CHLORIDE 0.9 %
10 VIAL (ML) INJECTION PRN
Status: DISCONTINUED | OUTPATIENT
Start: 2018-04-27 | End: 2018-04-28 | Stop reason: HOSPADM

## 2018-04-27 RX ORDER — 0.9 % SODIUM CHLORIDE 0.9 %
10 VIAL (ML) INJECTION PRN
Status: CANCELLED | OUTPATIENT
Start: 2018-04-27

## 2018-04-27 RX ORDER — CEFTRIAXONE 1 G/1
INJECTION, POWDER, FOR SOLUTION INTRAMUSCULAR; INTRAVENOUS
Status: DISPENSED
Start: 2018-04-27 | End: 2018-04-27

## 2018-04-27 RX ADMIN — CEFTRIAXONE SODIUM 2 G: 1 INJECTION, POWDER, FOR SOLUTION INTRAMUSCULAR; INTRAVENOUS at 10:04

## 2018-04-27 RX ADMIN — Medication 10 ML: at 10:04

## 2018-04-27 RX ADMIN — Medication 10 ML: at 10:37

## 2018-04-27 ASSESSMENT — PAIN SCALES - GENERAL
PAINLEVEL_OUTOF10: 0
PAINLEVEL_OUTOF10: 0

## 2018-04-27 NOTE — PROGRESS NOTES
__met__ Safety   GOAL: Patient will have ID or Allergy band on in the Urgent Care       (Environmental)   [place] to environment   Ensure ID band is correct and in place/ allergy band as needed   Assess for fall risk   Initiate fall precautions as applicable (fall band, side rails, etc.)   Call light within reach   Bed in low position/ wheels locked    __met__ Pain   GOAL:  Patient pain will be under control while here in the Urgent Care      Assess pain level and characteristics   Administer analgesics as ordered   Assess effectiveness of pain management and report to MD as needed    _met__ Knowledge Deficit:   GOAL: Patient will be educated on the medication they are receiving here in the Urgent Care   Assess baseline knowledge   Provide teaching at level of understanding   Provide teaching via preferred learning method   Evaluate teaching effectiveness    __met__ Hemodynamic/Respiratory Status:   GOAL: Patient vital signs will be assessed and monitored in the Urgent Care       (Pre and Post Procedure Monitoring)   Assess/Monitor vital signs and LOC   Assess Baseline SpO2 prior to any sedation   Obtain weight/height   Assess vital signs/ LOC until patient meets discharge criteria   Monitor procedure site and notify MD of any issues    __met__ Infection-Risk of Central Venous Catheter:   GOAL: Patient will be monitored for infection while in the Urgent Care   Monitor for infection signs and symptoms (catheter site redness, temperature elevation, etc)   Assess for infection risks   Educate regarding infection prevention   Manage central venous catheter (flushes/ dressing changes per protocol)    CARE PLAN END DATE:

## 2018-04-27 NOTE — PROGRESS NOTES
Pt released without complications. picc site clear without redness or swelling. Pt denies complaints.

## 2018-04-30 ENCOUNTER — HOSPITAL ENCOUNTER (OUTPATIENT)
Dept: HYPERBARIC MEDICINE | Age: 78
Discharge: HOME | End: 2018-04-30
Payer: MEDICARE

## 2018-04-30 VITALS
DIASTOLIC BLOOD PRESSURE: 68 MMHG | TEMPERATURE: 96.1 F | OXYGEN SATURATION: 100 % | RESPIRATION RATE: 16 BRPM | SYSTOLIC BLOOD PRESSURE: 151 MMHG | HEART RATE: 60 BPM

## 2018-04-30 DIAGNOSIS — I96 SKIN FLAP NECROSIS (HCC): ICD-10-CM

## 2018-04-30 DIAGNOSIS — T86.828 SKIN FLAP NECROSIS (HCC): ICD-10-CM

## 2018-04-30 PROCEDURE — G0277 HBOT, FULL BODY CHAMBER, 30M: HCPCS

## 2018-04-30 ASSESSMENT — PAIN SCALES - GENERAL
PAINLEVEL_OUTOF10: 0
PAINLEVEL_OUTOF10: 0

## 2018-05-01 ENCOUNTER — HOSPITAL ENCOUNTER (OUTPATIENT)
Dept: HYPERBARIC MEDICINE | Age: 78
Setting detail: THERAPIES SERIES
Discharge: HOME | End: 2018-05-01
Payer: MEDICARE

## 2018-05-01 VITALS
HEART RATE: 58 BPM | SYSTOLIC BLOOD PRESSURE: 129 MMHG | TEMPERATURE: 95 F | RESPIRATION RATE: 16 BRPM | OXYGEN SATURATION: 100 % | DIASTOLIC BLOOD PRESSURE: 60 MMHG

## 2018-05-01 DIAGNOSIS — I96 SKIN FLAP NECROSIS (HCC): ICD-10-CM

## 2018-05-01 DIAGNOSIS — T86.828 SKIN FLAP NECROSIS (HCC): ICD-10-CM

## 2018-05-01 PROCEDURE — G0277 HBOT, FULL BODY CHAMBER, 30M: HCPCS

## 2018-05-01 ASSESSMENT — PAIN SCALES - GENERAL: PAINLEVEL_OUTOF10: 0

## 2018-05-02 ENCOUNTER — HOSPITAL ENCOUNTER (OUTPATIENT)
Dept: HYPERBARIC MEDICINE | Age: 78
Setting detail: THERAPIES SERIES
Discharge: HOME | End: 2018-05-02
Payer: MEDICARE

## 2018-05-02 VITALS
SYSTOLIC BLOOD PRESSURE: 128 MMHG | HEART RATE: 56 BPM | OXYGEN SATURATION: 100 % | TEMPERATURE: 95.6 F | DIASTOLIC BLOOD PRESSURE: 65 MMHG | RESPIRATION RATE: 16 BRPM

## 2018-05-02 DIAGNOSIS — T86.828 SKIN FLAP NECROSIS (HCC): ICD-10-CM

## 2018-05-02 DIAGNOSIS — I96 SKIN FLAP NECROSIS (HCC): ICD-10-CM

## 2018-05-02 PROCEDURE — G0277 HBOT, FULL BODY CHAMBER, 30M: HCPCS

## 2018-05-02 ASSESSMENT — PAIN SCALES - GENERAL
PAINLEVEL_OUTOF10: 0
PAINLEVEL_OUTOF10: 0

## 2018-05-03 ENCOUNTER — HOSPITAL ENCOUNTER (OUTPATIENT)
Dept: HYPERBARIC MEDICINE | Age: 78
Setting detail: THERAPIES SERIES
Discharge: HOME | End: 2018-05-03
Payer: MEDICARE

## 2018-05-03 VITALS
HEART RATE: 57 BPM | SYSTOLIC BLOOD PRESSURE: 141 MMHG | DIASTOLIC BLOOD PRESSURE: 63 MMHG | OXYGEN SATURATION: 100 % | TEMPERATURE: 96.2 F | RESPIRATION RATE: 16 BRPM

## 2018-05-03 DIAGNOSIS — I96 SKIN FLAP NECROSIS (HCC): ICD-10-CM

## 2018-05-03 DIAGNOSIS — T86.828 SKIN FLAP NECROSIS (HCC): ICD-10-CM

## 2018-05-03 PROCEDURE — G0277 HBOT, FULL BODY CHAMBER, 30M: HCPCS

## 2018-05-03 PROCEDURE — 99183 HYPERBARIC OXYGEN THERAPY: CPT | Performed by: NURSE PRACTITIONER

## 2018-05-03 ASSESSMENT — PAIN SCALES - GENERAL: PAINLEVEL_OUTOF10: 0

## 2018-05-04 ENCOUNTER — HOSPITAL ENCOUNTER (OUTPATIENT)
Dept: HYPERBARIC MEDICINE | Age: 78
Setting detail: THERAPIES SERIES
Discharge: HOME | End: 2018-05-04
Payer: MEDICARE

## 2018-05-04 VITALS
OXYGEN SATURATION: 100 % | TEMPERATURE: 96.3 F | HEART RATE: 70 BPM | RESPIRATION RATE: 16 BRPM | SYSTOLIC BLOOD PRESSURE: 135 MMHG | DIASTOLIC BLOOD PRESSURE: 66 MMHG

## 2018-05-04 DIAGNOSIS — I96 SKIN FLAP NECROSIS (HCC): ICD-10-CM

## 2018-05-04 DIAGNOSIS — T86.828 SKIN FLAP NECROSIS (HCC): ICD-10-CM

## 2018-05-04 PROCEDURE — G0277 HBOT, FULL BODY CHAMBER, 30M: HCPCS

## 2018-05-04 ASSESSMENT — PAIN SCALES - GENERAL
PAINLEVEL_OUTOF10: 0
PAINLEVEL_OUTOF10: 0

## 2018-05-07 ENCOUNTER — HOSPITAL ENCOUNTER (OUTPATIENT)
Dept: HYPERBARIC MEDICINE | Age: 78
Setting detail: THERAPIES SERIES
Discharge: HOME | End: 2018-05-07
Payer: MEDICARE

## 2018-05-07 VITALS
RESPIRATION RATE: 16 BRPM | OXYGEN SATURATION: 100 % | DIASTOLIC BLOOD PRESSURE: 65 MMHG | SYSTOLIC BLOOD PRESSURE: 144 MMHG | TEMPERATURE: 95.6 F | HEART RATE: 56 BPM

## 2018-05-07 DIAGNOSIS — T86.828 SKIN FLAP NECROSIS (HCC): ICD-10-CM

## 2018-05-07 DIAGNOSIS — I96 SKIN FLAP NECROSIS (HCC): ICD-10-CM

## 2018-05-07 PROCEDURE — 99183 HYPERBARIC OXYGEN THERAPY: CPT | Performed by: NURSE PRACTITIONER

## 2018-05-07 PROCEDURE — G0277 HBOT, FULL BODY CHAMBER, 30M: HCPCS

## 2018-05-07 ASSESSMENT — PAIN SCALES - GENERAL
PAINLEVEL_OUTOF10: 0
PAINLEVEL_OUTOF10: 0

## 2018-05-08 ENCOUNTER — HOSPITAL ENCOUNTER (OUTPATIENT)
Dept: HYPERBARIC MEDICINE | Age: 78
Setting detail: THERAPIES SERIES
Discharge: HOME | End: 2018-05-08
Payer: MEDICARE

## 2018-05-08 VITALS
SYSTOLIC BLOOD PRESSURE: 137 MMHG | OXYGEN SATURATION: 99 % | HEART RATE: 55 BPM | RESPIRATION RATE: 16 BRPM | TEMPERATURE: 96.5 F | DIASTOLIC BLOOD PRESSURE: 61 MMHG

## 2018-05-08 DIAGNOSIS — T86.828 SKIN FLAP NECROSIS (HCC): ICD-10-CM

## 2018-05-08 DIAGNOSIS — I96 SKIN FLAP NECROSIS (HCC): ICD-10-CM

## 2018-05-08 PROCEDURE — G0277 HBOT, FULL BODY CHAMBER, 30M: HCPCS

## 2018-05-08 ASSESSMENT — PAIN SCALES - GENERAL: PAINLEVEL_OUTOF10: 0

## 2018-05-09 ENCOUNTER — HOSPITAL ENCOUNTER (OUTPATIENT)
Dept: HYPERBARIC MEDICINE | Age: 78
Setting detail: THERAPIES SERIES
Discharge: HOME | End: 2018-05-09
Payer: MEDICARE

## 2018-05-09 VITALS
HEART RATE: 51 BPM | OXYGEN SATURATION: 100 % | RESPIRATION RATE: 16 BRPM | TEMPERATURE: 96.5 F | SYSTOLIC BLOOD PRESSURE: 132 MMHG | DIASTOLIC BLOOD PRESSURE: 60 MMHG

## 2018-05-09 DIAGNOSIS — T86.828 SKIN FLAP NECROSIS (HCC): ICD-10-CM

## 2018-05-09 DIAGNOSIS — I96 SKIN FLAP NECROSIS (HCC): ICD-10-CM

## 2018-05-09 PROCEDURE — G0277 HBOT, FULL BODY CHAMBER, 30M: HCPCS

## 2018-05-09 ASSESSMENT — PAIN SCALES - GENERAL
PAINLEVEL_OUTOF10: 0
PAINLEVEL_OUTOF10: 0

## 2018-05-10 ENCOUNTER — HOSPITAL ENCOUNTER (OUTPATIENT)
Dept: HYPERBARIC MEDICINE | Age: 78
Setting detail: THERAPIES SERIES
Discharge: HOME | End: 2018-05-10
Payer: MEDICARE

## 2018-05-10 VITALS
TEMPERATURE: 96.4 F | HEART RATE: 50 BPM | RESPIRATION RATE: 16 BRPM | OXYGEN SATURATION: 100 % | DIASTOLIC BLOOD PRESSURE: 59 MMHG | SYSTOLIC BLOOD PRESSURE: 129 MMHG

## 2018-05-10 DIAGNOSIS — I96 SKIN FLAP NECROSIS (HCC): ICD-10-CM

## 2018-05-10 DIAGNOSIS — T86.828 SKIN FLAP NECROSIS (HCC): ICD-10-CM

## 2018-05-10 PROCEDURE — G0277 HBOT, FULL BODY CHAMBER, 30M: HCPCS

## 2018-05-10 ASSESSMENT — PAIN SCALES - GENERAL: PAINLEVEL_OUTOF10: 0

## 2018-05-11 ENCOUNTER — HOSPITAL ENCOUNTER (OUTPATIENT)
Dept: HYPERBARIC MEDICINE | Age: 78
Setting detail: THERAPIES SERIES
Discharge: HOME | End: 2018-05-11
Payer: MEDICARE

## 2018-05-11 VITALS
RESPIRATION RATE: 18 BRPM | HEART RATE: 50 BPM | SYSTOLIC BLOOD PRESSURE: 133 MMHG | DIASTOLIC BLOOD PRESSURE: 64 MMHG | TEMPERATURE: 96.4 F | OXYGEN SATURATION: 100 %

## 2018-05-11 DIAGNOSIS — I96 SKIN FLAP NECROSIS (HCC): ICD-10-CM

## 2018-05-11 DIAGNOSIS — T86.828 SKIN FLAP NECROSIS (HCC): ICD-10-CM

## 2018-05-11 PROCEDURE — G0277 HBOT, FULL BODY CHAMBER, 30M: HCPCS

## 2018-05-11 ASSESSMENT — PAIN SCALES - GENERAL
PAINLEVEL_OUTOF10: 0
PAINLEVEL_OUTOF10: 0

## 2018-05-14 ENCOUNTER — HOSPITAL ENCOUNTER (OUTPATIENT)
Dept: HYPERBARIC MEDICINE | Age: 78
Setting detail: THERAPIES SERIES
Discharge: HOME | End: 2018-05-14
Payer: MEDICARE

## 2018-05-14 VITALS
SYSTOLIC BLOOD PRESSURE: 142 MMHG | HEART RATE: 54 BPM | RESPIRATION RATE: 18 BRPM | OXYGEN SATURATION: 100 % | TEMPERATURE: 96 F | DIASTOLIC BLOOD PRESSURE: 64 MMHG

## 2018-05-14 DIAGNOSIS — T86.828 SKIN FLAP NECROSIS (HCC): ICD-10-CM

## 2018-05-14 DIAGNOSIS — I96 SKIN FLAP NECROSIS (HCC): ICD-10-CM

## 2018-05-14 PROCEDURE — G0277 HBOT, FULL BODY CHAMBER, 30M: HCPCS

## 2018-05-14 ASSESSMENT — PAIN SCALES - GENERAL
PAINLEVEL_OUTOF10: 0
PAINLEVEL_OUTOF10: 0

## 2018-05-15 ENCOUNTER — HOSPITAL ENCOUNTER (OUTPATIENT)
Dept: HYPERBARIC MEDICINE | Age: 78
Setting detail: THERAPIES SERIES
Discharge: HOME | End: 2018-05-15
Payer: MEDICARE

## 2018-05-15 VITALS
TEMPERATURE: 95.6 F | HEART RATE: 52 BPM | SYSTOLIC BLOOD PRESSURE: 146 MMHG | RESPIRATION RATE: 16 BRPM | DIASTOLIC BLOOD PRESSURE: 69 MMHG | OXYGEN SATURATION: 96 %

## 2018-05-15 DIAGNOSIS — T86.828 SKIN FLAP NECROSIS (HCC): ICD-10-CM

## 2018-05-15 DIAGNOSIS — I96 SKIN FLAP NECROSIS (HCC): ICD-10-CM

## 2018-05-15 PROCEDURE — G0277 HBOT, FULL BODY CHAMBER, 30M: HCPCS

## 2018-05-15 ASSESSMENT — PAIN SCALES - GENERAL
PAINLEVEL_OUTOF10: 0
PAINLEVEL_OUTOF10: 0

## 2018-05-16 ENCOUNTER — HOSPITAL ENCOUNTER (OUTPATIENT)
Dept: HYPERBARIC MEDICINE | Age: 78
Setting detail: THERAPIES SERIES
Discharge: HOME | End: 2018-05-16
Payer: MEDICARE

## 2018-05-16 VITALS
TEMPERATURE: 97.6 F | SYSTOLIC BLOOD PRESSURE: 150 MMHG | HEART RATE: 51 BPM | RESPIRATION RATE: 18 BRPM | DIASTOLIC BLOOD PRESSURE: 65 MMHG | OXYGEN SATURATION: 100 %

## 2018-05-16 DIAGNOSIS — I96 SKIN FLAP NECROSIS (HCC): ICD-10-CM

## 2018-05-16 DIAGNOSIS — T86.828 SKIN FLAP NECROSIS (HCC): ICD-10-CM

## 2018-05-16 PROCEDURE — G0277 HBOT, FULL BODY CHAMBER, 30M: HCPCS

## 2018-05-16 ASSESSMENT — PAIN SCALES - GENERAL
PAINLEVEL_OUTOF10: 0
PAINLEVEL_OUTOF10: 0

## 2018-05-18 ENCOUNTER — HOSPITAL ENCOUNTER (OUTPATIENT)
Dept: HYPERBARIC MEDICINE | Age: 78
Setting detail: THERAPIES SERIES
Discharge: HOME | End: 2018-05-18
Payer: MEDICARE

## 2018-05-18 VITALS
RESPIRATION RATE: 18 BRPM | OXYGEN SATURATION: 98 % | DIASTOLIC BLOOD PRESSURE: 67 MMHG | HEART RATE: 55 BPM | TEMPERATURE: 97.6 F | SYSTOLIC BLOOD PRESSURE: 150 MMHG

## 2018-05-18 DIAGNOSIS — I96 SKIN FLAP NECROSIS (HCC): ICD-10-CM

## 2018-05-18 DIAGNOSIS — T86.828 SKIN FLAP NECROSIS (HCC): ICD-10-CM

## 2018-05-18 PROCEDURE — G0277 HBOT, FULL BODY CHAMBER, 30M: HCPCS

## 2018-05-18 ASSESSMENT — PAIN SCALES - GENERAL: PAINLEVEL_OUTOF10: 0

## 2018-05-21 ENCOUNTER — HOSPITAL ENCOUNTER (OUTPATIENT)
Dept: HYPERBARIC MEDICINE | Age: 78
Setting detail: THERAPIES SERIES
Discharge: HOME | End: 2018-05-21
Payer: MEDICARE

## 2018-05-21 VITALS
SYSTOLIC BLOOD PRESSURE: 140 MMHG | HEART RATE: 52 BPM | OXYGEN SATURATION: 100 % | TEMPERATURE: 96.3 F | DIASTOLIC BLOOD PRESSURE: 67 MMHG | RESPIRATION RATE: 16 BRPM

## 2018-05-21 DIAGNOSIS — I96 SKIN FLAP NECROSIS (HCC): ICD-10-CM

## 2018-05-21 DIAGNOSIS — T86.828 SKIN FLAP NECROSIS (HCC): ICD-10-CM

## 2018-05-21 PROCEDURE — G0277 HBOT, FULL BODY CHAMBER, 30M: HCPCS

## 2018-05-21 ASSESSMENT — PAIN SCALES - GENERAL
PAINLEVEL_OUTOF10: 0
PAINLEVEL_OUTOF10: 0

## 2018-05-22 ENCOUNTER — HOSPITAL ENCOUNTER (OUTPATIENT)
Dept: HYPERBARIC MEDICINE | Age: 78
Setting detail: THERAPIES SERIES
Discharge: HOME | End: 2018-05-22
Payer: MEDICARE

## 2018-05-22 VITALS
TEMPERATURE: 96.2 F | DIASTOLIC BLOOD PRESSURE: 62 MMHG | RESPIRATION RATE: 16 BRPM | OXYGEN SATURATION: 100 % | SYSTOLIC BLOOD PRESSURE: 140 MMHG | HEART RATE: 50 BPM

## 2018-05-22 DIAGNOSIS — I96 SKIN FLAP NECROSIS (HCC): ICD-10-CM

## 2018-05-22 DIAGNOSIS — T86.828 SKIN FLAP NECROSIS (HCC): ICD-10-CM

## 2018-05-22 PROCEDURE — G0277 HBOT, FULL BODY CHAMBER, 30M: HCPCS

## 2018-05-22 ASSESSMENT — PAIN SCALES - GENERAL
PAINLEVEL_OUTOF10: 0
PAINLEVEL_OUTOF10: 0

## 2018-05-23 ENCOUNTER — HOSPITAL ENCOUNTER (OUTPATIENT)
Dept: HYPERBARIC MEDICINE | Age: 78
Setting detail: THERAPIES SERIES
Discharge: HOME | End: 2018-05-23
Payer: MEDICARE

## 2018-05-23 VITALS
HEART RATE: 52 BPM | OXYGEN SATURATION: 99 % | TEMPERATURE: 96.8 F | DIASTOLIC BLOOD PRESSURE: 65 MMHG | RESPIRATION RATE: 18 BRPM | SYSTOLIC BLOOD PRESSURE: 139 MMHG

## 2018-05-23 DIAGNOSIS — T86.828 SKIN FLAP NECROSIS (HCC): ICD-10-CM

## 2018-05-23 DIAGNOSIS — I96 SKIN FLAP NECROSIS (HCC): ICD-10-CM

## 2018-05-23 PROCEDURE — G0277 HBOT, FULL BODY CHAMBER, 30M: HCPCS

## 2018-05-23 ASSESSMENT — PAIN SCALES - GENERAL
PAINLEVEL_OUTOF10: 0
PAINLEVEL_OUTOF10: 0

## 2018-05-24 ENCOUNTER — HOSPITAL ENCOUNTER (OUTPATIENT)
Dept: HYPERBARIC MEDICINE | Age: 78
Setting detail: THERAPIES SERIES
Discharge: HOME | End: 2018-05-24
Payer: MEDICARE

## 2018-05-24 VITALS
RESPIRATION RATE: 18 BRPM | HEART RATE: 57 BPM | SYSTOLIC BLOOD PRESSURE: 143 MMHG | TEMPERATURE: 96.8 F | OXYGEN SATURATION: 100 % | DIASTOLIC BLOOD PRESSURE: 67 MMHG

## 2018-05-24 PROCEDURE — 99183 HYPERBARIC OXYGEN THERAPY: CPT | Performed by: NURSE PRACTITIONER

## 2018-05-24 PROCEDURE — G0277 HBOT, FULL BODY CHAMBER, 30M: HCPCS

## 2018-05-24 ASSESSMENT — PAIN SCALES - GENERAL: PAINLEVEL_OUTOF10: 0

## 2018-05-25 ENCOUNTER — HOSPITAL ENCOUNTER (OUTPATIENT)
Dept: HYPERBARIC MEDICINE | Age: 78
Setting detail: THERAPIES SERIES
Discharge: HOME | End: 2018-05-25
Payer: MEDICARE

## 2018-05-25 VITALS
SYSTOLIC BLOOD PRESSURE: 136 MMHG | TEMPERATURE: 97.6 F | OXYGEN SATURATION: 99 % | HEART RATE: 53 BPM | RESPIRATION RATE: 18 BRPM | DIASTOLIC BLOOD PRESSURE: 62 MMHG

## 2018-05-25 DIAGNOSIS — T86.828 SKIN FLAP NECROSIS (HCC): ICD-10-CM

## 2018-05-25 DIAGNOSIS — I96 SKIN FLAP NECROSIS (HCC): ICD-10-CM

## 2018-05-25 PROCEDURE — G0277 HBOT, FULL BODY CHAMBER, 30M: HCPCS

## 2018-05-25 ASSESSMENT — PAIN SCALES - GENERAL
PAINLEVEL_OUTOF10: 0
PAINLEVEL_OUTOF10: 0

## 2018-05-28 ENCOUNTER — APPOINTMENT (OUTPATIENT)
Dept: HYPERBARIC MEDICINE | Age: 78
End: 2018-05-28
Payer: MEDICARE

## 2018-05-29 ENCOUNTER — HOSPITAL ENCOUNTER (OUTPATIENT)
Dept: HYPERBARIC MEDICINE | Age: 78
Setting detail: THERAPIES SERIES
Discharge: HOME | End: 2018-05-29
Payer: MEDICARE

## 2018-05-29 VITALS
RESPIRATION RATE: 16 BRPM | TEMPERATURE: 97.6 F | SYSTOLIC BLOOD PRESSURE: 142 MMHG | OXYGEN SATURATION: 100 % | DIASTOLIC BLOOD PRESSURE: 67 MMHG | HEART RATE: 55 BPM

## 2018-05-29 DIAGNOSIS — T86.828 SKIN FLAP NECROSIS (HCC): ICD-10-CM

## 2018-05-29 DIAGNOSIS — I96 SKIN FLAP NECROSIS (HCC): ICD-10-CM

## 2018-05-29 PROCEDURE — G0277 HBOT, FULL BODY CHAMBER, 30M: HCPCS

## 2018-05-29 ASSESSMENT — PAIN SCALES - GENERAL
PAINLEVEL_OUTOF10: 0
PAINLEVEL_OUTOF10: 0

## 2018-05-30 ENCOUNTER — HOSPITAL ENCOUNTER (OUTPATIENT)
Dept: HYPERBARIC MEDICINE | Age: 78
Setting detail: THERAPIES SERIES
Discharge: HOME | End: 2018-05-30
Payer: MEDICARE

## 2018-05-30 VITALS
TEMPERATURE: 96 F | SYSTOLIC BLOOD PRESSURE: 142 MMHG | RESPIRATION RATE: 16 BRPM | OXYGEN SATURATION: 98 % | HEART RATE: 51 BPM | DIASTOLIC BLOOD PRESSURE: 68 MMHG

## 2018-05-30 DIAGNOSIS — T86.828 SKIN FLAP NECROSIS (HCC): ICD-10-CM

## 2018-05-30 DIAGNOSIS — I96 SKIN FLAP NECROSIS (HCC): ICD-10-CM

## 2018-05-30 PROCEDURE — G0277 HBOT, FULL BODY CHAMBER, 30M: HCPCS

## 2018-05-30 ASSESSMENT — PAIN SCALES - GENERAL
PAINLEVEL_OUTOF10: 0
PAINLEVEL_OUTOF10: 0

## 2018-05-31 ENCOUNTER — HOSPITAL ENCOUNTER (OUTPATIENT)
Dept: HYPERBARIC MEDICINE | Age: 78
Setting detail: THERAPIES SERIES
Discharge: HOME | End: 2018-05-31
Payer: MEDICARE

## 2018-05-31 VITALS
OXYGEN SATURATION: 98 % | TEMPERATURE: 96.5 F | HEART RATE: 53 BPM | DIASTOLIC BLOOD PRESSURE: 65 MMHG | SYSTOLIC BLOOD PRESSURE: 143 MMHG | RESPIRATION RATE: 16 BRPM

## 2018-05-31 DIAGNOSIS — T86.828 SKIN FLAP NECROSIS (HCC): ICD-10-CM

## 2018-05-31 DIAGNOSIS — I96 SKIN FLAP NECROSIS (HCC): ICD-10-CM

## 2018-05-31 PROCEDURE — G0277 HBOT, FULL BODY CHAMBER, 30M: HCPCS

## 2018-05-31 ASSESSMENT — PAIN SCALES - GENERAL
PAINLEVEL_OUTOF10: 0
PAINLEVEL_OUTOF10: 0

## 2018-06-01 ENCOUNTER — HOSPITAL ENCOUNTER (OUTPATIENT)
Dept: HYPERBARIC MEDICINE | Age: 78
Setting detail: THERAPIES SERIES
Discharge: HOME | End: 2018-06-01
Payer: MEDICARE

## 2018-06-01 VITALS
OXYGEN SATURATION: 100 % | HEART RATE: 52 BPM | TEMPERATURE: 96.2 F | SYSTOLIC BLOOD PRESSURE: 146 MMHG | RESPIRATION RATE: 16 BRPM | DIASTOLIC BLOOD PRESSURE: 63 MMHG

## 2018-06-01 DIAGNOSIS — T86.828 SKIN FLAP NECROSIS (HCC): ICD-10-CM

## 2018-06-01 DIAGNOSIS — I96 SKIN FLAP NECROSIS (HCC): ICD-10-CM

## 2018-06-01 PROCEDURE — G0277 HBOT, FULL BODY CHAMBER, 30M: HCPCS

## 2018-06-01 ASSESSMENT — PAIN SCALES - GENERAL
PAINLEVEL_OUTOF10: 0
PAINLEVEL_OUTOF10: 0

## 2018-06-04 ENCOUNTER — HOSPITAL ENCOUNTER (OUTPATIENT)
Dept: HYPERBARIC MEDICINE | Age: 78
Setting detail: THERAPIES SERIES
Discharge: HOME | End: 2018-06-04
Payer: MEDICARE

## 2018-06-04 VITALS
HEART RATE: 53 BPM | DIASTOLIC BLOOD PRESSURE: 71 MMHG | RESPIRATION RATE: 17 BRPM | SYSTOLIC BLOOD PRESSURE: 148 MMHG | OXYGEN SATURATION: 100 % | TEMPERATURE: 96.1 F

## 2018-06-04 DIAGNOSIS — I96 SKIN FLAP NECROSIS (HCC): ICD-10-CM

## 2018-06-04 DIAGNOSIS — T86.828 SKIN FLAP NECROSIS (HCC): ICD-10-CM

## 2018-06-04 PROCEDURE — G0277 HBOT, FULL BODY CHAMBER, 30M: HCPCS

## 2018-06-04 ASSESSMENT — PAIN SCALES - GENERAL
PAINLEVEL_OUTOF10: 0
PAINLEVEL_OUTOF10: 0

## 2018-12-12 ENCOUNTER — HOSPITAL ENCOUNTER (OUTPATIENT)
Age: 78
Setting detail: OUTPATIENT SURGERY
Discharge: HOME | End: 2018-12-12
Attending: SPECIALIST | Admitting: SPECIALIST
Payer: MEDICARE

## 2018-12-12 VITALS
HEIGHT: 70 IN | DIASTOLIC BLOOD PRESSURE: 69 MMHG | WEIGHT: 189 LBS | OXYGEN SATURATION: 99 % | SYSTOLIC BLOOD PRESSURE: 151 MMHG | HEART RATE: 67 BPM | RESPIRATION RATE: 16 BRPM | BODY MASS INDEX: 27.06 KG/M2 | TEMPERATURE: 97.6 F

## 2018-12-12 PROCEDURE — 2709999900 HC NON-CHARGEABLE SUPPLY: Performed by: SPECIALIST

## 2018-12-12 PROCEDURE — 3600000002 HC SURGERY LEVEL 2 BASE: Performed by: SPECIALIST

## 2018-12-12 PROCEDURE — 2500000003 HC RX 250 WO HCPCS: Performed by: SPECIALIST

## 2018-12-12 PROCEDURE — 7100000010 HC PHASE II RECOVERY - FIRST 15 MIN: Performed by: SPECIALIST

## 2018-12-12 PROCEDURE — 3600000012 HC SURGERY LEVEL 2 ADDTL 15MIN: Performed by: SPECIALIST

## 2018-12-12 PROCEDURE — 88305 TISSUE EXAM BY PATHOLOGIST: CPT

## 2018-12-12 PROCEDURE — 7100000011 HC PHASE II RECOVERY - ADDTL 15 MIN: Performed by: SPECIALIST

## 2018-12-12 RX ORDER — LIDOCAINE HYDROCHLORIDE AND EPINEPHRINE 10; 10 MG/ML; UG/ML
INJECTION, SOLUTION INFILTRATION; PERINEURAL PRN
Status: DISCONTINUED | OUTPATIENT
Start: 2018-12-12 | End: 2018-12-12 | Stop reason: HOSPADM

## 2018-12-12 ASSESSMENT — PAIN - FUNCTIONAL ASSESSMENT: PAIN_FUNCTIONAL_ASSESSMENT: 0-10

## 2018-12-12 ASSESSMENT — PAIN SCALES - GENERAL: PAINLEVEL_OUTOF10: 0

## 2019-01-15 ENCOUNTER — HOSPITAL ENCOUNTER (OUTPATIENT)
Age: 79
Discharge: HOME | End: 2019-01-15
Payer: MEDICARE

## 2019-01-15 LAB
ANION GAP SERPL CALCULATED.3IONS-SCNC: 11 MEQ/L (ref 8–16)
AVERAGE GLUCOSE: 111 MG/DL (ref 70–126)
BASOPHILS # BLD: 0.5 %
BASOPHILS ABSOLUTE: 0 THOU/MM3 (ref 0–0.1)
BUN BLDV-MCNC: 21 MG/DL (ref 7–22)
CALCIUM SERPL-MCNC: 9.3 MG/DL (ref 8.5–10.5)
CHLORIDE BLD-SCNC: 102 MEQ/L (ref 98–111)
CHOLESTEROL, TOTAL: 157 MG/DL (ref 100–199)
CO2: 26 MEQ/L (ref 23–33)
CREAT SERPL-MCNC: 0.9 MG/DL (ref 0.4–1.2)
EOSINOPHIL # BLD: 2.4 %
EOSINOPHILS ABSOLUTE: 0.2 THOU/MM3 (ref 0–0.4)
ERYTHROCYTE [DISTWIDTH] IN BLOOD BY AUTOMATED COUNT: 12.4 % (ref 11.5–14.5)
ERYTHROCYTE [DISTWIDTH] IN BLOOD BY AUTOMATED COUNT: 46.6 FL (ref 35–45)
GFR SERPL CREATININE-BSD FRML MDRD: 81 ML/MIN/1.73M2
GLUCOSE BLD-MCNC: 96 MG/DL (ref 70–108)
HBA1C MFR BLD: 5.7 % (ref 4.4–6.4)
HCT VFR BLD CALC: 41.7 % (ref 42–52)
HDLC SERPL-MCNC: 51 MG/DL
HEMOGLOBIN: 13.9 GM/DL (ref 14–18)
IMMATURE GRANS (ABS): 0.02 THOU/MM3 (ref 0–0.07)
IMMATURE GRANULOCYTES: 0.2 %
LDL CHOLESTEROL CALCULATED: 85 MG/DL
LYMPHOCYTES # BLD: 22.9 %
LYMPHOCYTES ABSOLUTE: 1.8 THOU/MM3 (ref 1–4.8)
MCH RBC QN AUTO: 33.9 PG (ref 26–33)
MCHC RBC AUTO-ENTMCNC: 33.3 GM/DL (ref 32.2–35.5)
MCV RBC AUTO: 101.7 FL (ref 80–94)
MONOCYTES # BLD: 9.7 %
MONOCYTES ABSOLUTE: 0.8 THOU/MM3 (ref 0.4–1.3)
NUCLEATED RED BLOOD CELLS: 0 /100 WBC
PLATELET # BLD: 193 THOU/MM3 (ref 130–400)
PMV BLD AUTO: 10.6 FL (ref 9.4–12.4)
POTASSIUM SERPL-SCNC: 5.1 MEQ/L (ref 3.5–5.2)
RBC # BLD: 4.1 MILL/MM3 (ref 4.7–6.1)
SEG NEUTROPHILS: 64.3 %
SEGMENTED NEUTROPHILS ABSOLUTE COUNT: 5.1 THOU/MM3 (ref 1.8–7.7)
SODIUM BLD-SCNC: 139 MEQ/L (ref 135–145)
TRIGL SERPL-MCNC: 103 MG/DL (ref 0–199)
WBC # BLD: 8 THOU/MM3 (ref 4.8–10.8)

## 2019-01-15 PROCEDURE — 36415 COLL VENOUS BLD VENIPUNCTURE: CPT

## 2019-01-15 PROCEDURE — 85025 COMPLETE CBC W/AUTO DIFF WBC: CPT

## 2019-01-15 PROCEDURE — 80061 LIPID PANEL: CPT

## 2019-01-15 PROCEDURE — 80048 BASIC METABOLIC PNL TOTAL CA: CPT

## 2019-01-15 PROCEDURE — 83036 HEMOGLOBIN GLYCOSYLATED A1C: CPT

## 2019-01-23 ENCOUNTER — HOSPITAL ENCOUNTER (OUTPATIENT)
Age: 79
Discharge: HOME | End: 2019-01-23
Payer: MEDICARE

## 2019-01-23 LAB
FOLATE: > 20 NG/ML (ref 4.8–24.2)
VITAMIN B-12: 617 PG/ML (ref 211–911)

## 2019-01-23 PROCEDURE — 82607 VITAMIN B-12: CPT

## 2019-01-23 PROCEDURE — 36415 COLL VENOUS BLD VENIPUNCTURE: CPT

## 2019-01-23 PROCEDURE — 82746 ASSAY OF FOLIC ACID SERUM: CPT

## 2019-04-26 ENCOUNTER — HOSPITAL ENCOUNTER (OUTPATIENT)
Age: 79
Setting detail: OUTPATIENT SURGERY
Discharge: HOME | End: 2019-04-26
Attending: SPECIALIST | Admitting: SPECIALIST
Payer: MEDICARE

## 2019-04-26 VITALS
BODY MASS INDEX: 30.98 KG/M2 | HEIGHT: 66 IN | RESPIRATION RATE: 12 BRPM | OXYGEN SATURATION: 97 % | DIASTOLIC BLOOD PRESSURE: 67 MMHG | TEMPERATURE: 97.2 F | HEART RATE: 62 BPM | SYSTOLIC BLOOD PRESSURE: 140 MMHG | WEIGHT: 192.8 LBS

## 2019-04-26 PROCEDURE — 3600000012 HC SURGERY LEVEL 2 ADDTL 15MIN: Performed by: SPECIALIST

## 2019-04-26 PROCEDURE — 7100000010 HC PHASE II RECOVERY - FIRST 15 MIN: Performed by: SPECIALIST

## 2019-04-26 PROCEDURE — 3600000002 HC SURGERY LEVEL 2 BASE: Performed by: SPECIALIST

## 2019-04-26 PROCEDURE — 6370000000 HC RX 637 (ALT 250 FOR IP): Performed by: SPECIALIST

## 2019-04-26 PROCEDURE — 2709999900 HC NON-CHARGEABLE SUPPLY: Performed by: SPECIALIST

## 2019-04-26 PROCEDURE — 2500000003 HC RX 250 WO HCPCS: Performed by: SPECIALIST

## 2019-04-26 PROCEDURE — 7100000011 HC PHASE II RECOVERY - ADDTL 15 MIN: Performed by: SPECIALIST

## 2019-04-26 RX ORDER — GINSENG 100 MG
CAPSULE ORAL PRN
Status: DISCONTINUED | OUTPATIENT
Start: 2019-04-26 | End: 2019-04-26 | Stop reason: ALTCHOICE

## 2019-04-26 RX ORDER — LIDOCAINE HYDROCHLORIDE 10 MG/ML
INJECTION, SOLUTION INFILTRATION; PERINEURAL PRN
Status: DISCONTINUED | OUTPATIENT
Start: 2019-04-26 | End: 2019-04-26 | Stop reason: ALTCHOICE

## 2019-04-26 ASSESSMENT — PAIN SCALES - GENERAL: PAINLEVEL_OUTOF10: 0

## 2019-04-26 ASSESSMENT — PAIN - FUNCTIONAL ASSESSMENT: PAIN_FUNCTIONAL_ASSESSMENT: 0-10

## 2019-04-26 NOTE — PROGRESS NOTES
9904-  Patient arrived to phase II via transport chair. Spontaneous respirations even and unlabored. Placed on monitor--VSS. Report received from [facility].   2094-  Assessment completed. Patient is alert and oriented x4. Patient denies pain--will monitor. See incision charting. 0552-  Coffee given to patient. Family brought to room. 1201-  Belongings brought to patient. 6511-  Patient dressing. 2337-  Discharge instructions given. Understanding verbalized. Patient up to bathroom. 2708-  Patient discharged in stable condition with all belongings.

## 2019-04-26 NOTE — OP NOTE
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 Kasey Chavez PA-C/Martina Green DPM    Estimated Blood Loss: 5ml     Complications: none immediately appreciated    Procedure: With the patient lying in the supine position and under adequate local anesthesia consisting of 23 ml of 1% Lidocaine 1:100,000 with epinephrine solution of the donor site of the right preauricular sulcus as well as the right ear defect. The patient has very thin skin and a large defect which could not be closed primarily, therefore a full thickness skin graft was taken. It was defatted and secured in position with a 3-0 silk suture tie and then a 5-0 fast absorbable suture was placed in a simple running fashion. A Bacitracin Xeroform and moist cotton ball tie over bolster was secured using the tails of the silk sutures. The donor site was closed with a 4-0 Monocryl suture placed in interrupted buried fashion and then Histoacryl respectively. The patient tolerated the procedure well and remained hemodynamically stable throughout the procedure and was quite comfortable throughout the operative course.     Clinical staging for cancer cases:  Ct  Cn  Cm    Dillon Lord  Electronically signed by me on 2019 at 8:36 AM

## 2019-04-26 NOTE — H&P
[facility]  History and Physical Update    Pt Name: Eli Carson  MRN: [id number]  YOB: 1940  Date of evaluation: 4/26/2019    I have examined the patient and reviewed the H&P/Consult and there are no changes to the patient or plans.       Bjorn Steel  Electronically signed 4/26/2019 at 6:38 AM

## 2020-01-22 ENCOUNTER — HOSPITAL ENCOUNTER (OUTPATIENT)
Age: 80
Discharge: HOME | End: 2020-01-22
Payer: MEDICARE

## 2020-01-22 LAB
ANION GAP SERPL CALCULATED.3IONS-SCNC: 11 MEQ/L (ref 8–16)
AVERAGE GLUCOSE: 114 MG/DL (ref 70–126)
BASOPHILS # BLD: 0.3 %
BASOPHILS ABSOLUTE: 0 THOU/MM3 (ref 0–0.1)
BUN BLDV-MCNC: 22 MG/DL (ref 7–22)
CALCIUM SERPL-MCNC: 9.4 MG/DL (ref 8.5–10.5)
CHLORIDE BLD-SCNC: 103 MEQ/L (ref 98–111)
CHOLESTEROL, TOTAL: 151 MG/DL (ref 100–199)
CO2: 24 MEQ/L (ref 23–33)
CREAT SERPL-MCNC: 0.8 MG/DL (ref 0.4–1.2)
EOSINOPHIL # BLD: 1.8 %
EOSINOPHILS ABSOLUTE: 0.1 THOU/MM3 (ref 0–0.4)
ERYTHROCYTE [DISTWIDTH] IN BLOOD BY AUTOMATED COUNT: 12.2 % (ref 11.5–14.5)
ERYTHROCYTE [DISTWIDTH] IN BLOOD BY AUTOMATED COUNT: 46.5 FL (ref 35–45)
GFR SERPL CREATININE-BSD FRML MDRD: > 90 ML/MIN/1.73M2
GLUCOSE BLD-MCNC: 94 MG/DL (ref 70–108)
HBA1C MFR BLD: 5.8 % (ref 4.4–6.4)
HCT VFR BLD CALC: 41.3 % (ref 42–52)
HDLC SERPL-MCNC: 52 MG/DL
HEMOGLOBIN: 13.8 GM/DL (ref 14–18)
IMMATURE GRANS (ABS): 0.01 THOU/MM3 (ref 0–0.07)
IMMATURE GRANULOCYTES: 0.1 %
LDL CHOLESTEROL CALCULATED: 79 MG/DL
LYMPHOCYTES # BLD: 36.6 %
LYMPHOCYTES ABSOLUTE: 2.6 THOU/MM3 (ref 1–4.8)
MCH RBC QN AUTO: 34.6 PG (ref 26–33)
MCHC RBC AUTO-ENTMCNC: 33.4 GM/DL (ref 32.2–35.5)
MCV RBC AUTO: 103.5 FL (ref 80–94)
MONOCYTES # BLD: 9 %
MONOCYTES ABSOLUTE: 0.6 THOU/MM3 (ref 0.4–1.3)
NUCLEATED RED BLOOD CELLS: 0 /100 WBC
PLATELET # BLD: 198 THOU/MM3 (ref 130–400)
PMV BLD AUTO: 10.6 FL (ref 9.4–12.4)
POTASSIUM SERPL-SCNC: 4.4 MEQ/L (ref 3.5–5.2)
RBC # BLD: 3.99 MILL/MM3 (ref 4.7–6.1)
SEG NEUTROPHILS: 52.2 %
SEGMENTED NEUTROPHILS ABSOLUTE COUNT: 3.8 THOU/MM3 (ref 1.8–7.7)
SODIUM BLD-SCNC: 138 MEQ/L (ref 135–145)
TRIGL SERPL-MCNC: 102 MG/DL (ref 0–199)
WBC # BLD: 7.2 THOU/MM3 (ref 4.8–10.8)

## 2020-01-22 PROCEDURE — 85025 COMPLETE CBC W/AUTO DIFF WBC: CPT

## 2020-01-22 PROCEDURE — 80048 BASIC METABOLIC PNL TOTAL CA: CPT

## 2020-01-22 PROCEDURE — 83036 HEMOGLOBIN GLYCOSYLATED A1C: CPT

## 2020-01-22 PROCEDURE — 36415 COLL VENOUS BLD VENIPUNCTURE: CPT

## 2020-01-22 PROCEDURE — 80061 LIPID PANEL: CPT

## 2021-01-17 ENCOUNTER — HOSPITAL ENCOUNTER (INPATIENT)
Age: 81
LOS: 12 days | Discharge: NURSING, REHAB OR LONG-TERM CARE | DRG: 177 | End: 2021-01-29
Attending: INTERNAL MEDICINE | Admitting: INTERNAL MEDICINE
Payer: MEDICARE

## 2021-01-17 ENCOUNTER — APPOINTMENT (OUTPATIENT)
Dept: CT IMAGING | Age: 81
DRG: 177 | End: 2021-01-17
Payer: MEDICARE

## 2021-01-17 ENCOUNTER — APPOINTMENT (OUTPATIENT)
Dept: GENERAL RADIOLOGY | Age: 81
DRG: 177 | End: 2021-01-17
Payer: MEDICARE

## 2021-01-17 DIAGNOSIS — J96.01 ACUTE HYPOXEMIC RESPIRATORY FAILURE DUE TO COVID-19 (HCC): Primary | ICD-10-CM

## 2021-01-17 DIAGNOSIS — U07.1 ACUTE HYPOXEMIC RESPIRATORY FAILURE DUE TO COVID-19 (HCC): Primary | ICD-10-CM

## 2021-01-17 LAB
ABO: NORMAL
ALBUMIN SERPL-MCNC: 3.2 G/DL (ref 3.5–5.1)
ALLEN TEST: ABNORMAL
ALP BLD-CCNC: 80 U/L (ref 38–126)
ALT SERPL-CCNC: 18 U/L (ref 11–66)
ANION GAP SERPL CALCULATED.3IONS-SCNC: 15 MEQ/L (ref 8–16)
APTT: 20.5 SECONDS (ref 22–38)
AST SERPL-CCNC: 43 U/L (ref 5–40)
BASE EXCESS (CALCULATED): -3 MMOL/L (ref -2.5–2.5)
BASOPHILS # BLD: 0.1 %
BASOPHILS ABSOLUTE: 0 THOU/MM3 (ref 0–0.1)
BILIRUB SERPL-MCNC: 0.6 MG/DL (ref 0.3–1.2)
BILIRUBIN DIRECT: < 0.2 MG/DL (ref 0–0.3)
BUN BLDV-MCNC: 16 MG/DL (ref 7–22)
C-REACTIVE PROTEIN: 9.62 MG/DL (ref 0–1)
CALCIUM SERPL-MCNC: 7.9 MG/DL (ref 8.5–10.5)
CHLORIDE BLD-SCNC: 90 MEQ/L (ref 98–111)
CO2: 19 MEQ/L (ref 23–33)
COLLECTED BY:: ABNORMAL
CREAT SERPL-MCNC: 0.7 MG/DL (ref 0.4–1.2)
D-DIMER QUANTITATIVE: 925 NG/ML FEU (ref 0–500)
DEVICE: ABNORMAL
EKG ATRIAL RATE: 91 BPM
EKG P AXIS: 70 DEGREES
EKG P-R INTERVAL: 182 MS
EKG Q-T INTERVAL: 358 MS
EKG QRS DURATION: 94 MS
EKG QTC CALCULATION (BAZETT): 437 MS
EKG R AXIS: -30 DEGREES
EKG T AXIS: 63 DEGREES
EKG VENTRICULAR RATE: 90 BPM
EOSINOPHIL # BLD: 0 %
EOSINOPHILS ABSOLUTE: 0 THOU/MM3 (ref 0–0.4)
ERYTHROCYTE [DISTWIDTH] IN BLOOD BY AUTOMATED COUNT: 12.3 % (ref 11.5–14.5)
ERYTHROCYTE [DISTWIDTH] IN BLOOD BY AUTOMATED COUNT: 43.1 FL (ref 35–45)
FERRITIN: 1770 NG/ML (ref 22–322)
GFR SERPL CREATININE-BSD FRML MDRD: > 90 ML/MIN/1.73M2
GLUCOSE BLD-MCNC: 160 MG/DL (ref 70–108)
HCO3: 20 MMOL/L (ref 23–28)
HCT VFR BLD CALC: 37.1 % (ref 42–52)
HEMOGLOBIN: 13.2 GM/DL (ref 14–18)
IMMATURE GRANS (ABS): 0.03 THOU/MM3 (ref 0–0.07)
IMMATURE GRANULOCYTES: 0.4 %
INR BLD: 1.12 (ref 0.85–1.13)
LACTIC ACID: 1.5 MMOL/L (ref 0.5–2.2)
LD: 308 U/L (ref 100–190)
LIPASE: 19 U/L (ref 5.6–51.3)
LYMPHOCYTES # BLD: 4.8 %
LYMPHOCYTES ABSOLUTE: 0.4 THOU/MM3 (ref 1–4.8)
MCH RBC QN AUTO: 33.9 PG (ref 26–33)
MCHC RBC AUTO-ENTMCNC: 35.6 GM/DL (ref 32.2–35.5)
MCV RBC AUTO: 95.4 FL (ref 80–94)
MONOCYTES # BLD: 6.8 %
MONOCYTES ABSOLUTE: 0.5 THOU/MM3 (ref 0.4–1.3)
NUCLEATED RED BLOOD CELLS: 0 /100 WBC
O2 SATURATION: 93 %
OSMOLALITY CALCULATION: 254.2 MOSMOL/KG (ref 275–300)
PCO2 (TEMP CORRECTED): 29 (ref 35–45)
PCO2: 29 MMHG (ref 35–45)
PH (TEMPERATURE CORRECTED): 7.45 (ref 7.35–7.45)
PH BLOOD GAS: 7.45 (ref 7.35–7.45)
PLATELET # BLD: 207 THOU/MM3 (ref 130–400)
PMV BLD AUTO: 9.3 FL (ref 9.4–12.4)
PO2 (TEMP CORRECTED): 62 (ref 71–104)
PO2: 62 MMHG (ref 71–104)
POTASSIUM SERPL-SCNC: 3.9 MEQ/L (ref 3.5–5.2)
PROCALCITONIN: 0.2 NG/ML (ref 0.01–0.09)
RBC # BLD: 3.89 MILL/MM3 (ref 4.7–6.1)
RH FACTOR: NORMAL
SARS-COV-2, NAAT: DETECTED
SEG NEUTROPHILS: 87.9 %
SEGMENTED NEUTROPHILS ABSOLUTE COUNT: 6.8 THOU/MM3 (ref 1.8–7.7)
SODIUM BLD-SCNC: 124 MEQ/L (ref 135–145)
SOURCE, BLOOD GAS: ABNORMAL
TEMPERATURE: 37
TOTAL PROTEIN: 6.6 G/DL (ref 6.1–8)
TROPONIN T: < 0.01 NG/ML
WBC # BLD: 7.7 THOU/MM3 (ref 4.8–10.8)

## 2021-01-17 PROCEDURE — 71045 X-RAY EXAM CHEST 1 VIEW: CPT

## 2021-01-17 PROCEDURE — 83690 ASSAY OF LIPASE: CPT

## 2021-01-17 PROCEDURE — 71275 CT ANGIOGRAPHY CHEST: CPT

## 2021-01-17 PROCEDURE — 85379 FIBRIN DEGRADATION QUANT: CPT

## 2021-01-17 PROCEDURE — 94761 N-INVAS EAR/PLS OXIMETRY MLT: CPT

## 2021-01-17 PROCEDURE — U0002 COVID-19 LAB TEST NON-CDC: HCPCS

## 2021-01-17 PROCEDURE — 87040 BLOOD CULTURE FOR BACTERIA: CPT

## 2021-01-17 PROCEDURE — 36600 WITHDRAWAL OF ARTERIAL BLOOD: CPT

## 2021-01-17 PROCEDURE — 93005 ELECTROCARDIOGRAM TRACING: CPT | Performed by: EMERGENCY MEDICINE

## 2021-01-17 PROCEDURE — 85025 COMPLETE CBC W/AUTO DIFF WBC: CPT

## 2021-01-17 PROCEDURE — 2580000003 HC RX 258: Performed by: PHYSICIAN ASSISTANT

## 2021-01-17 PROCEDURE — 99284 EMERGENCY DEPT VISIT MOD MDM: CPT

## 2021-01-17 PROCEDURE — 2500000003 HC RX 250 WO HCPCS: Performed by: PHYSICIAN ASSISTANT

## 2021-01-17 PROCEDURE — 99222 1ST HOSP IP/OBS MODERATE 55: CPT | Performed by: PHYSICIAN ASSISTANT

## 2021-01-17 PROCEDURE — 80053 COMPREHEN METABOLIC PANEL: CPT

## 2021-01-17 PROCEDURE — 83605 ASSAY OF LACTIC ACID: CPT

## 2021-01-17 PROCEDURE — 36415 COLL VENOUS BLD VENIPUNCTURE: CPT

## 2021-01-17 PROCEDURE — 86901 BLOOD TYPING SEROLOGIC RH(D): CPT

## 2021-01-17 PROCEDURE — 6360000004 HC RX CONTRAST MEDICATION: Performed by: PHYSICIAN ASSISTANT

## 2021-01-17 PROCEDURE — 83615 LACTATE (LD) (LDH) ENZYME: CPT

## 2021-01-17 PROCEDURE — 82728 ASSAY OF FERRITIN: CPT

## 2021-01-17 PROCEDURE — 82248 BILIRUBIN DIRECT: CPT

## 2021-01-17 PROCEDURE — 84484 ASSAY OF TROPONIN QUANT: CPT

## 2021-01-17 PROCEDURE — 84145 PROCALCITONIN (PCT): CPT

## 2021-01-17 PROCEDURE — 96365 THER/PROPH/DIAG IV INF INIT: CPT

## 2021-01-17 PROCEDURE — 85730 THROMBOPLASTIN TIME PARTIAL: CPT

## 2021-01-17 PROCEDURE — 82803 BLOOD GASES ANY COMBINATION: CPT

## 2021-01-17 PROCEDURE — 86140 C-REACTIVE PROTEIN: CPT

## 2021-01-17 PROCEDURE — 6360000002 HC RX W HCPCS: Performed by: PHYSICIAN ASSISTANT

## 2021-01-17 PROCEDURE — 2060000000 HC ICU INTERMEDIATE R&B

## 2021-01-17 PROCEDURE — P9059 PLASMA, FRZ BETWEEN 8-24HOUR: HCPCS

## 2021-01-17 PROCEDURE — 85610 PROTHROMBIN TIME: CPT

## 2021-01-17 PROCEDURE — 86900 BLOOD TYPING SEROLOGIC ABO: CPT

## 2021-01-17 PROCEDURE — 2700000000 HC OXYGEN THERAPY PER DAY

## 2021-01-17 RX ORDER — ASPIRIN 81 MG/1
81 TABLET, CHEWABLE ORAL DAILY
Status: DISCONTINUED | OUTPATIENT
Start: 2021-01-18 | End: 2021-01-29 | Stop reason: HOSPADM

## 2021-01-17 RX ORDER — VITAMIN B COMPLEX
1000 TABLET ORAL DAILY
Status: DISCONTINUED | OUTPATIENT
Start: 2021-01-18 | End: 2021-01-29 | Stop reason: HOSPADM

## 2021-01-17 RX ORDER — SODIUM CHLORIDE 0.9 % (FLUSH) 0.9 %
10 SYRINGE (ML) INJECTION PRN
Status: DISCONTINUED | OUTPATIENT
Start: 2021-01-17 | End: 2021-01-29 | Stop reason: HOSPADM

## 2021-01-17 RX ORDER — ACETAMINOPHEN 650 MG/1
650 SUPPOSITORY RECTAL EVERY 6 HOURS PRN
Status: DISCONTINUED | OUTPATIENT
Start: 2021-01-17 | End: 2021-01-29 | Stop reason: HOSPADM

## 2021-01-17 RX ORDER — ONDANSETRON 2 MG/ML
4 INJECTION INTRAMUSCULAR; INTRAVENOUS EVERY 6 HOURS PRN
Status: DISCONTINUED | OUTPATIENT
Start: 2021-01-17 | End: 2021-01-29 | Stop reason: HOSPADM

## 2021-01-17 RX ORDER — DEXAMETHASONE 4 MG/1
6 TABLET ORAL ONCE
Status: COMPLETED | OUTPATIENT
Start: 2021-01-17 | End: 2021-01-17

## 2021-01-17 RX ORDER — LOSARTAN POTASSIUM 100 MG/1
100 TABLET ORAL DAILY
Status: DISCONTINUED | OUTPATIENT
Start: 2021-01-18 | End: 2021-01-29 | Stop reason: HOSPADM

## 2021-01-17 RX ORDER — AMLODIPINE BESYLATE 5 MG/1
5 TABLET ORAL DAILY
Status: DISCONTINUED | OUTPATIENT
Start: 2021-01-18 | End: 2021-01-29 | Stop reason: HOSPADM

## 2021-01-17 RX ORDER — MAGNESIUM SULFATE IN WATER 40 MG/ML
2 INJECTION, SOLUTION INTRAVENOUS PRN
Status: DISCONTINUED | OUTPATIENT
Start: 2021-01-17 | End: 2021-01-29 | Stop reason: HOSPADM

## 2021-01-17 RX ORDER — ZINC SULFATE 50(220)MG
50 CAPSULE ORAL DAILY
Status: DISCONTINUED | OUTPATIENT
Start: 2021-01-18 | End: 2021-01-29 | Stop reason: HOSPADM

## 2021-01-17 RX ORDER — SODIUM CHLORIDE 0.9 % (FLUSH) 0.9 %
10 SYRINGE (ML) INJECTION EVERY 12 HOURS SCHEDULED
Status: DISCONTINUED | OUTPATIENT
Start: 2021-01-17 | End: 2021-01-29 | Stop reason: HOSPADM

## 2021-01-17 RX ORDER — DEXAMETHASONE 4 MG/1
6 TABLET ORAL DAILY
Status: DISCONTINUED | OUTPATIENT
Start: 2021-01-17 | End: 2021-01-17

## 2021-01-17 RX ORDER — 0.9 % SODIUM CHLORIDE 0.9 %
1000 INTRAVENOUS SOLUTION INTRAVENOUS ONCE
Status: COMPLETED | OUTPATIENT
Start: 2021-01-17 | End: 2021-01-17

## 2021-01-17 RX ORDER — POTASSIUM CHLORIDE 7.45 MG/ML
10 INJECTION INTRAVENOUS PRN
Status: DISCONTINUED | OUTPATIENT
Start: 2021-01-17 | End: 2021-01-29 | Stop reason: HOSPADM

## 2021-01-17 RX ORDER — SODIUM CHLORIDE 9 MG/ML
INJECTION, SOLUTION INTRAVENOUS PRN
Status: DISCONTINUED | OUTPATIENT
Start: 2021-01-17 | End: 2021-01-29 | Stop reason: HOSPADM

## 2021-01-17 RX ORDER — DEXAMETHASONE 4 MG/1
6 TABLET ORAL DAILY
Status: DISCONTINUED | OUTPATIENT
Start: 2021-01-18 | End: 2021-01-26

## 2021-01-17 RX ORDER — POTASSIUM CHLORIDE 20 MEQ/1
40 TABLET, EXTENDED RELEASE ORAL PRN
Status: DISCONTINUED | OUTPATIENT
Start: 2021-01-17 | End: 2021-01-29 | Stop reason: HOSPADM

## 2021-01-17 RX ORDER — ACETAMINOPHEN 325 MG/1
650 TABLET ORAL EVERY 6 HOURS PRN
Status: DISCONTINUED | OUTPATIENT
Start: 2021-01-17 | End: 2021-01-29 | Stop reason: HOSPADM

## 2021-01-17 RX ORDER — PROMETHAZINE HYDROCHLORIDE 25 MG/1
12.5 TABLET ORAL EVERY 6 HOURS PRN
Status: DISCONTINUED | OUTPATIENT
Start: 2021-01-17 | End: 2021-01-29 | Stop reason: HOSPADM

## 2021-01-17 RX ORDER — ASCORBIC ACID 500 MG
500 TABLET ORAL DAILY
Status: DISCONTINUED | OUTPATIENT
Start: 2021-01-18 | End: 2021-01-29 | Stop reason: HOSPADM

## 2021-01-17 RX ORDER — POLYETHYLENE GLYCOL 3350 17 G/17G
17 POWDER, FOR SOLUTION ORAL DAILY PRN
Status: DISCONTINUED | OUTPATIENT
Start: 2021-01-17 | End: 2021-01-29 | Stop reason: HOSPADM

## 2021-01-17 RX ADMIN — DOXYCYCLINE 100 MG: 100 INJECTION, POWDER, LYOPHILIZED, FOR SOLUTION INTRAVENOUS at 20:36

## 2021-01-17 RX ADMIN — SODIUM CHLORIDE 1000 ML: 9 INJECTION, SOLUTION INTRAVENOUS at 19:57

## 2021-01-17 RX ADMIN — DEXAMETHASONE 6 MG: 4 TABLET ORAL at 19:12

## 2021-01-17 RX ADMIN — CEFTRIAXONE SODIUM 1 G: 1 INJECTION, POWDER, FOR SOLUTION INTRAMUSCULAR; INTRAVENOUS at 19:57

## 2021-01-17 RX ADMIN — IOPAMIDOL 80 ML: 755 INJECTION, SOLUTION INTRAVENOUS at 20:28

## 2021-01-17 ASSESSMENT — ENCOUNTER SYMPTOMS
DIARRHEA: 0
COUGH: 1
ABDOMINAL PAIN: 0
SHORTNESS OF BREATH: 1
COLOR CHANGE: 0
EYES NEGATIVE: 1
VOMITING: 0
SORE THROAT: 0
ALLERGIC/IMMUNOLOGIC NEGATIVE: 1
GASTROINTESTINAL NEGATIVE: 1

## 2021-01-17 NOTE — ED TRIAGE NOTES
Pt presents to the ED for SOB and cough x5 days. Pt states he is only SOB with exertion. Daughter states pt's O2 at home was 67% today. Pt denies pain. Upon arrival to ED pt is 67% on room.

## 2021-01-18 PROBLEM — I10 ESSENTIAL HYPERTENSION: Status: ACTIVE | Noted: 2021-01-18

## 2021-01-18 LAB
ALBUMIN SERPL-MCNC: 2.9 G/DL (ref 3.5–5.1)
ALP BLD-CCNC: 70 U/L (ref 38–126)
ALT SERPL-CCNC: 17 U/L (ref 11–66)
ANION GAP SERPL CALCULATED.3IONS-SCNC: 12 MEQ/L (ref 8–16)
AST SERPL-CCNC: 35 U/L (ref 5–40)
BASOPHILS # BLD: 0.2 %
BASOPHILS ABSOLUTE: 0 THOU/MM3 (ref 0–0.1)
BILIRUB SERPL-MCNC: 0.4 MG/DL (ref 0.3–1.2)
BILIRUBIN DIRECT: < 0.2 MG/DL (ref 0–0.3)
BUN BLDV-MCNC: 14 MG/DL (ref 7–22)
CALCIUM SERPL-MCNC: 7.5 MG/DL (ref 8.5–10.5)
CHLORIDE BLD-SCNC: 95 MEQ/L (ref 98–111)
CO2: 20 MEQ/L (ref 23–33)
CREAT SERPL-MCNC: 0.6 MG/DL (ref 0.4–1.2)
CREATININE URINE: 105.7 MG/DL
EOSINOPHIL # BLD: 0 %
EOSINOPHILS ABSOLUTE: 0 THOU/MM3 (ref 0–0.4)
ERYTHROCYTE [DISTWIDTH] IN BLOOD BY AUTOMATED COUNT: 12.4 % (ref 11.5–14.5)
ERYTHROCYTE [DISTWIDTH] IN BLOOD BY AUTOMATED COUNT: 44.6 FL (ref 35–45)
GFR SERPL CREATININE-BSD FRML MDRD: > 90 ML/MIN/1.73M2
GLUCOSE BLD-MCNC: 133 MG/DL (ref 70–108)
HCT VFR BLD CALC: 34.7 % (ref 42–52)
HEMOGLOBIN: 11.9 GM/DL (ref 14–18)
IMMATURE GRANS (ABS): 0.05 THOU/MM3 (ref 0–0.07)
IMMATURE GRANULOCYTES: 0.8 %
LYMPHOCYTES # BLD: 5.5 %
LYMPHOCYTES ABSOLUTE: 0.3 THOU/MM3 (ref 1–4.8)
MCH RBC QN AUTO: 33.6 PG (ref 26–33)
MCHC RBC AUTO-ENTMCNC: 34.3 GM/DL (ref 32.2–35.5)
MCV RBC AUTO: 98 FL (ref 80–94)
MONOCYTES # BLD: 4 %
MONOCYTES ABSOLUTE: 0.2 THOU/MM3 (ref 0.4–1.3)
NUCLEATED RED BLOOD CELLS: 0 /100 WBC
OSMOLALITY URINE: 604 MOSMOL/KG (ref 250–750)
PLATELET # BLD: 201 THOU/MM3 (ref 130–400)
PMV BLD AUTO: 9.5 FL (ref 9.4–12.4)
POTASSIUM REFLEX MAGNESIUM: 4.1 MEQ/L (ref 3.5–5.2)
PRO-BNP: 3487 PG/ML (ref 0–1800)
PROCALCITONIN: 0.18 NG/ML (ref 0.01–0.09)
RBC # BLD: 3.54 MILL/MM3 (ref 4.7–6.1)
SEG NEUTROPHILS: 89.5 %
SEGMENTED NEUTROPHILS ABSOLUTE COUNT: 5.5 THOU/MM3 (ref 1.8–7.7)
SODIUM BLD-SCNC: 123 MEQ/L (ref 135–145)
SODIUM BLD-SCNC: 124 MEQ/L (ref 135–145)
SODIUM BLD-SCNC: 127 MEQ/L (ref 135–145)
SODIUM URINE: 21 MEQ/L
TOTAL PROTEIN: 5.9 G/DL (ref 6.1–8)
WBC # BLD: 6.2 THOU/MM3 (ref 4.8–10.8)

## 2021-01-18 PROCEDURE — 2580000003 HC RX 258: Performed by: HOSPITALIST

## 2021-01-18 PROCEDURE — 84295 ASSAY OF SERUM SODIUM: CPT

## 2021-01-18 PROCEDURE — 83880 ASSAY OF NATRIURETIC PEPTIDE: CPT

## 2021-01-18 PROCEDURE — XW13325 TRANSFUSION OF CONVALESCENT PLASMA (NONAUTOLOGOUS) INTO PERIPHERAL VEIN, PERCUTANEOUS APPROACH, NEW TECHNOLOGY GROUP 5: ICD-10-PCS | Performed by: INTERNAL MEDICINE

## 2021-01-18 PROCEDURE — 99233 SBSQ HOSP IP/OBS HIGH 50: CPT | Performed by: HOSPITALIST

## 2021-01-18 PROCEDURE — 97530 THERAPEUTIC ACTIVITIES: CPT

## 2021-01-18 PROCEDURE — 82570 ASSAY OF URINE CREATININE: CPT

## 2021-01-18 PROCEDURE — 6370000000 HC RX 637 (ALT 250 FOR IP): Performed by: PHYSICIAN ASSISTANT

## 2021-01-18 PROCEDURE — XW033E5 INTRODUCTION OF REMDESIVIR ANTI-INFECTIVE INTO PERIPHERAL VEIN, PERCUTANEOUS APPROACH, NEW TECHNOLOGY GROUP 5: ICD-10-PCS | Performed by: INTERNAL MEDICINE

## 2021-01-18 PROCEDURE — 80048 BASIC METABOLIC PNL TOTAL CA: CPT

## 2021-01-18 PROCEDURE — 83935 ASSAY OF URINE OSMOLALITY: CPT

## 2021-01-18 PROCEDURE — 6370000000 HC RX 637 (ALT 250 FOR IP): Performed by: HOSPITALIST

## 2021-01-18 PROCEDURE — 6360000002 HC RX W HCPCS: Performed by: PHYSICIAN ASSISTANT

## 2021-01-18 PROCEDURE — 2580000003 HC RX 258: Performed by: PHYSICIAN ASSISTANT

## 2021-01-18 PROCEDURE — 2060000000 HC ICU INTERMEDIATE R&B

## 2021-01-18 PROCEDURE — 36415 COLL VENOUS BLD VENIPUNCTURE: CPT

## 2021-01-18 PROCEDURE — 97162 PT EVAL MOD COMPLEX 30 MIN: CPT

## 2021-01-18 PROCEDURE — 80076 HEPATIC FUNCTION PANEL: CPT

## 2021-01-18 PROCEDURE — 85025 COMPLETE CBC W/AUTO DIFF WBC: CPT

## 2021-01-18 PROCEDURE — 2500000003 HC RX 250 WO HCPCS: Performed by: INTERNAL MEDICINE

## 2021-01-18 PROCEDURE — 84300 ASSAY OF URINE SODIUM: CPT

## 2021-01-18 PROCEDURE — 2580000003 HC RX 258: Performed by: INTERNAL MEDICINE

## 2021-01-18 PROCEDURE — 2700000000 HC OXYGEN THERAPY PER DAY

## 2021-01-18 PROCEDURE — 94761 N-INVAS EAR/PLS OXIMETRY MLT: CPT

## 2021-01-18 PROCEDURE — 94660 CPAP INITIATION&MGMT: CPT

## 2021-01-18 PROCEDURE — 94669 MECHANICAL CHEST WALL OSCILL: CPT

## 2021-01-18 RX ORDER — PANTOPRAZOLE SODIUM 40 MG/1
40 TABLET, DELAYED RELEASE ORAL
Status: DISCONTINUED | OUTPATIENT
Start: 2021-01-18 | End: 2021-01-29 | Stop reason: HOSPADM

## 2021-01-18 RX ORDER — 0.9 % SODIUM CHLORIDE 0.9 %
30 INTRAVENOUS SOLUTION INTRAVENOUS PRN
Status: DISCONTINUED | OUTPATIENT
Start: 2021-01-18 | End: 2021-01-29 | Stop reason: HOSPADM

## 2021-01-18 RX ORDER — SODIUM CHLORIDE 9 MG/ML
INJECTION, SOLUTION INTRAVENOUS CONTINUOUS
Status: DISCONTINUED | OUTPATIENT
Start: 2021-01-18 | End: 2021-01-20

## 2021-01-18 RX ADMIN — ENOXAPARIN SODIUM 40 MG: 40 INJECTION SUBCUTANEOUS at 23:46

## 2021-01-18 RX ADMIN — Medication 1000 UNITS: at 08:02

## 2021-01-18 RX ADMIN — ENOXAPARIN SODIUM 40 MG: 40 INJECTION SUBCUTANEOUS at 00:32

## 2021-01-18 RX ADMIN — AZITHROMYCIN DIHYDRATE 500 MG: 500 INJECTION, POWDER, LYOPHILIZED, FOR SOLUTION INTRAVENOUS at 00:32

## 2021-01-18 RX ADMIN — Medication 50 MG: at 08:02

## 2021-01-18 RX ADMIN — OXYCODONE HYDROCHLORIDE AND ACETAMINOPHEN 500 MG: 500 TABLET ORAL at 08:02

## 2021-01-18 RX ADMIN — ASPIRIN 81 MG: 81 TABLET, CHEWABLE ORAL at 08:14

## 2021-01-18 RX ADMIN — AZITHROMYCIN DIHYDRATE 500 MG: 500 INJECTION, POWDER, LYOPHILIZED, FOR SOLUTION INTRAVENOUS at 23:41

## 2021-01-18 RX ADMIN — CEFTRIAXONE SODIUM 1 G: 1 INJECTION, POWDER, FOR SOLUTION INTRAMUSCULAR; INTRAVENOUS at 20:25

## 2021-01-18 RX ADMIN — DEXAMETHASONE 6 MG: 4 TABLET ORAL at 08:01

## 2021-01-18 RX ADMIN — AMLODIPINE BESYLATE 5 MG: 5 TABLET ORAL at 08:02

## 2021-01-18 RX ADMIN — ENOXAPARIN SODIUM 40 MG: 40 INJECTION SUBCUTANEOUS at 12:09

## 2021-01-18 RX ADMIN — LOSARTAN POTASSIUM 100 MG: 100 TABLET, FILM COATED ORAL at 08:01

## 2021-01-18 RX ADMIN — REMDESIVIR 200 MG: 100 INJECTION, POWDER, LYOPHILIZED, FOR SOLUTION INTRAVENOUS at 04:27

## 2021-01-18 RX ADMIN — PANTOPRAZOLE SODIUM 40 MG: 40 TABLET, DELAYED RELEASE ORAL at 08:14

## 2021-01-18 RX ADMIN — SODIUM CHLORIDE: 9 INJECTION, SOLUTION INTRAVENOUS at 17:48

## 2021-01-18 RX ADMIN — SODIUM CHLORIDE: 9 INJECTION, SOLUTION INTRAVENOUS at 07:46

## 2021-01-18 ASSESSMENT — ENCOUNTER SYMPTOMS
COUGH: 1
GASTROINTESTINAL NEGATIVE: 1
EYES NEGATIVE: 1
SHORTNESS OF BREATH: 1
ALLERGIC/IMMUNOLOGIC NEGATIVE: 1

## 2021-01-18 ASSESSMENT — PAIN SCALES - GENERAL
PAINLEVEL_OUTOF10: 0
PAINLEVEL_OUTOF10: 0

## 2021-01-18 NOTE — ED NOTES
Assumed care at this time. Pt swabbed for COVID and medicated per MAR. VS reassessed. Pt sitting up with non re breather on. Pt RR reg. No acute distress noted.  Will continue to monitor      Ana Nixon RN  01/17/21 1940

## 2021-01-18 NOTE — ED NOTES
ED to inpatient nurses report    Chief Complaint   Patient presents with    Shortness of Breath    Cough      Present to ED from home  LOC: alert and orientated to name, place, date  Vital signs   Vitals:    01/17/21 1911 01/17/21 1930 01/1940 01/17/21 2037   BP: 106/70   (!) 138/95   Pulse: 77   76   Resp: 24 (!) 31 18 15   Temp:       TempSrc:       SpO2: 94% 95% 99% 95%      Oxygen Baseline 95%    Current needs required high flow nasal cannula Bipap/Cpap No  LDAs:   Peripheral IV 01/17/21 Left Forearm (Active)   Site Assessment Clean;Dry; Intact 01/17/21 2038   Line Status Flushed; Infusing 01/17/21 2038   Dressing Status Clean;Dry; Intact 01/17/21 2038   Dressing Intervention New 01/17/21 1846     Mobility: Requires assistance * 1  Pending ED orders: none  Present condition: stable     Electronically signed by Ana Nixon RN on 1/17/2021 at 9:23 PM       Ana Nixno RN  01/17/21 2123

## 2021-01-18 NOTE — PROGRESS NOTES
[address] [address]  INPATIENT PHYSICAL THERAPY  EVALUATION  [facility]    Time In: 4880  Time Out: 1538  Timed Code Treatment Minutes: 12 Minutes  Minutes: 27          Date: 2021  Patient Name: Omayra Cruz,  Gender:  male        MRN: [id number]  : 1940  (79 y.o.)      Referring Practitioner: Zain Tomas MD  Diagnosis: Acute hypoxemic respiratory failure due to COVID-19  Additional Pertinent Hx: Per ED note, pt \"is a 79 y.o. male who presents to the Emergency Department for the evaluation of cough and shortness of breath. Patient reports gradual onset of symptoms over the past 5 days with significant worsening of the shortness of breath over the past 2 days. He reports unproductive cough associated with decreased sense of taste and smell and some nasal congestion over the past week. Denies any known sick contacts and has not been tested for Covid since symptom onset. He reports worsening of shortness of breath with exertion, improvement with rest.  Reports generalized weakness that occurs with activity. He has been taking Tylenol and was also started on vitamin C, vitamin D, zinc by his daughter earlier in the season. He denies any pulmonary history. He is not a smoker. He denies any associated fevers, chills, generalized body aches, headache, sore throat, vomiting, diarrhea, wheezing, edema, orthopnea or dizziness. \"     Restrictions/Precautions:  Restrictions/Precautions: General Precautions, Fall Risk, Isolation  Position Activity Restriction  Other position/activity restrictions: COVID+;  on BiPAP with settings 65%, PEEP 12    Subjective:  Chart Reviewed: Yes  Patient assessed for rehabilitation services?: Yes  Subjective: Pt resting in bed and agrees to therapy. RN approves session as O2 sats remain appropriate.     General:  Overall Orientation Status: Within Functional Limits    Vision: Within Functional Limits    Hearing: Within functional limits Pain: denies      Social/Functional History:    Lives With: Spouse  Type of Home: House  Home Layout: One level  Home Access: Stairs to enter with rails  Entrance Stairs - Number of Steps: 3-4 steps with 1 rail  Home Equipment: (none)                   Ambulation Assistance: Independent  Transfer Assistance: Independent          Additional Comments: Pt reports being Independent with all mobility in PLOF. OBJECTIVE:  Range of Motion:  Bilateral Lower Extremity: WFL    Strength:  Bilateral Lower Extremity: grossly 4/5    Balance:  Static Sitting Balance:  Supervision  Dynamic Sitting Balance: Stand By Assistance  Static Standing Balance: Contact Guard Assistance  Dynamic Standing Balance: Contact Guard Assistance    Bed Mobility:  Supine to Sit: Minimal Assistance  Sit to Supine: Contact Guard Assistance     Transfers:  Sit to Stand: Contact Guard Assistance  Stand to Sit:Contact Guard Assistance    Ambulation:  Contact Guard Assistance  Distance: 4-5 ft fwd and back x 4 trials  Surface: Level Tile  Device:Hand-Held Assist  Gait Deviations:  Decreased Step Length Bilaterally, Decreased Gait Speed, Decreased Heel Strike Bilaterally and limited to allowance of BiPAP tubing. O2 sats >89% throughout. Exercise:  Patient was guided in 1 set(s) 10 reps of exercise to both lower extremities. Ankle pumps, Glut sets, Quad sets, Heelslides, Hip abduction/adduction, Seated marches and Long arc quads. Exercises were completed for increased independence with functional mobility. Functional Outcome Measures: Completed  -PAC Inpatient Mobility Raw Score : 18  -PAC Inpatient T-Scale Score : 43.63    ASSESSMENT:  Activity Tolerance:  Patient tolerance of  treatment: good. With BiPAP support      Treatment Initiated: Treatment and education initiated within context of evaluation.   Evaluation time included review of current medical information, gathering information related to past medical, social and functional history, completion of standardized testing, formal and informal observation of tasks, assessment of data and development of plan of care and goals. Treatment time included skilled education and facilitation of tasks to increase safety and independence with functional mobility for improved independence and quality of life. Assessment: Body structures, Functions, Activity limitations: Decreased functional mobility , Decreased endurance, Decreased strength  Assessment: Pt is an 79 y.o. male that is requiring BiPAP currently and waw Independent with all PLOF. Pt participated well with mobility and at CGA to close SBA for transfers and mobility, but is limited with mobility to range of BiPAP tubing. Pt would benefit from continued skilled PT to address endurance building, strengthening, and functional mobility. Prognosis: Good    REQUIRES PT FOLLOW UP: Yes    Discharge Recommendations:  Discharge Recommendations: Continue to assess pending progress    Patient Education:  PT Education: Goals, PT Role, Plan of Care, Home Exercise Program    Equipment Recommendations: Other: monitor for needs - poss RW at discharge?     Plan:  Times per week: 4-5x GM  Current Treatment Recommendations: Strengthening, Gait Training, Patient/Caregiver Education & Training, Equipment Evaluation, Education, & procurement, Balance Training, Functional Mobility Training, Endurance Training, Transfer Training, Safety Education & Training, Home Exercise Program    Goals:  Patient goals : go home  Short term goals  Time Frame for Short term goals: at discharge  Short term goal 1: Pt to be Mod I for supine <> sit to get in/out of bed  Short term goal 2: Pt to be Mod I for sit <> stand to get up to ambulate  Short term goal 3: Pt to ambulate > 60 ft with/without AD with Supervision for household distances  Long term goals  Time Frame for Long term goals : not set due to short ELOS    Following session, patient left in safe position with all fall risk precautions in place. Karlo Press.  Fide Webb, [place] [address]

## 2021-01-18 NOTE — H&P
skin    Hypertension      SHX:    Social History     Socioeconomic History    Marital status:      Spouse name: Eli Contreras Number of children: 3    Years of education: Not on file    Highest education level: Not on file   Occupational History    Not on file   Social Needs    Financial resource strain: Not on file    Food insecurity     Worry: Not on file     Inability: Not on file   [employer] needs     Medical: Not on file     Non-medical: Not on file   Tobacco Use    Smoking status: Never Smoker    Smokeless tobacco: Never Used   Substance and Sexual Activity    Alcohol use: Yes     Comment: occasional 3-4 drinks per week    Drug use: No    Sexual activity: Not on file   Lifestyle    Physical activity     Days per week: Not on file     Minutes per session: Not on file    Stress: Not on file   Relationships    Social connections     Talks on phone: Not on file     Gets together: Not on file     Attends [employer] service: Not on file     Active member of club or organization: Not on file     Attends meetings of clubs or organizations: Not on file     Relationship status: Not on file    Intimate partner violence     Fear of current or ex partner: Not on file     Emotionally abused: Not on file     Physically abused: Not on file     Forced sexual activity: Not on file   Other Topics Concern    Not on file   Social History Narrative    Not on file     FHX:   Family History   Problem Relation Age of Onset    High Blood Pressure Mother     High Blood Pressure Father     Stroke Father     Diabetes Son     Cancer Neg Hx     Heart Disease Neg Hx      Allergies: No Known Allergies  Medications:       sodium chloride  1,000 mL Intravenous Once    doxycycline (VIBRAMYCIN) IV  100 mg Intravenous Once          Labs:   Recent Results (from the past 24 hour(s))   EKG SOB    Collection Time: 01/17/21  6:33 PM   Result Value Ref Range    Ventricular Rate 90 BPM    Atrial Rate 91 BPM    P-R Interval 182 ms    QRS Duration 94 ms    Q-T Interval 358 ms    QTc Calculation (Bazett) 437 ms    P Axis 70 degrees    R Axis -30 degrees    T Axis 63 degrees   CBC auto differential    Collection Time: 01/17/21  6:40 PM   Result Value Ref Range    WBC 7.7 4.8 - 10.8 thou/mm3    RBC 3.89 (L) 4.70 - 6.10 mill/mm3    Hemoglobin 13.2 (L) 14.0 - 18.0 gm/dl    Hematocrit 37.1 (L) 42.0 - 52.0 %    MCV 95.4 (H) 80.0 - 94.0 fL    MCH 33.9 (H) 26.0 - 33.0 pg    MCHC 35.6 (H) 32.2 - 35.5 gm/dl    RDW-CV 12.3 11.5 - 14.5 %    RDW-SD 43.1 35.0 - 45.0 fL    Platelets 474 632 - 544 thou/mm3    MPV 9.3 (L) 9.4 - 12.4 fL    Seg Neutrophils 87.9 %    Lymphocytes 4.8 %    Monocytes 6.8 %    Eosinophils 0.0 %    Basophils 0.1 %    Immature Granulocytes 0.4 %    Segs Absolute 6.8 1.8 - 7.7 thou/mm3    Lymphocytes Absolute 0.4 (L) 1.0 - 4.8 thou/mm3    Monocytes Absolute 0.5 0.4 - 1.3 thou/mm3    Eosinophils Absolute 0.0 0.0 - 0.4 thou/mm3    Basophils Absolute 0.0 0.0 - 0.1 thou/mm3    Immature Grans (Abs) 0.03 0.00 - 0.07 thou/mm3    nRBC 0 /100 wbc   Basic Metabolic Panel    Collection Time: 01/17/21  6:40 PM   Result Value Ref Range    Sodium 124 (L) 135 - 145 meq/L    Potassium 3.9 3.5 - 5.2 meq/L    Chloride 90 (L) 98 - 111 meq/L    CO2 19 (L) 23 - 33 meq/L    Glucose 160 (H) 70 - 108 mg/dL    BUN 16 7 - 22 mg/dL    CREATININE 0.7 0.4 - 1.2 mg/dL    Calcium 7.9 (L) 8.5 - 10.5 mg/dL   Lipase    Collection Time: 01/17/21  6:40 PM   Result Value Ref Range    Lipase 19.0 5.6 - 51.3 U/L   Hepatic function panel    Collection Time: 01/17/21  6:40 PM   Result Value Ref Range    Alb 3.2 (L) 3.5 - 5.1 g/dL    Total Bilirubin 0.6 0.3 - 1.2 mg/dL    Bilirubin, Direct <0.2 0.0 - 0.3 mg/dL    Alkaline Phosphatase 80 38 - 126 U/L    AST 43 (H) 5 - 40 U/L    ALT 18 11 - 66 U/L    Total Protein 6.6 6.1 - 8.0 g/dL   APTT    Collection Time: 01/17/21  6:40 PM   Result Value Ref Range    aPTT 20.5 (L) 22.0 - 38.0 seconds   Protime-INR    Collection Time: 01/17/21  6:40 PM   Result Value Ref Range    INR 1.12 0.85 - 1.13   Troponin    Collection Time: 01/17/21  6:40 PM   Result Value Ref Range    Troponin T < 0.010 ng/ml   Anion Gap    Collection Time: 01/17/21  6:40 PM   Result Value Ref Range    Anion Gap 15.0 8.0 - 16.0 meq/L   Glomerular Filtration Rate, Estimated    Collection Time: 01/17/21  6:40 PM   Result Value Ref Range    Est, Glom Filt Rate >90 ml/min/1.73m2   Osmolality    Collection Time: 01/17/21  6:40 PM   Result Value Ref Range    Osmolality Calc 254.2 (L) 275.0 - 300.0 mOsmol/kg   COVID-19    Collection Time: 01/17/21  7:15 PM   Result Value Ref Range    SARS-CoV-2, NAAT DETECTED (AA) NOT DETECTED   D-dimer, quantitative    Collection Time: 01/17/21  7:30 PM   Result Value Ref Range    D-Dimer, Quant 925.00 (H) 0.00 - 500.00 ng/ml FEU   Ferritin    Collection Time: 01/17/21  7:30 PM   Result Value Ref Range    Ferritin 1,770 (H) 22 - 322 ng/mL   C-reactive protein    Collection Time: 01/17/21  7:30 PM   Result Value Ref Range    CRP 9.62 (H) 0.00 - 1.00 mg/dl   Lactate Dehydrogenase    Collection Time: 01/17/21  7:30 PM   Result Value Ref Range     (H) 100 - 190 U/L   Lactic acid, plasma    Collection Time: 01/17/21  7:30 PM   Result Value Ref Range    Lactic Acid 1.5 0.5 - 2.2 mmol/L   Procalcitonin    Collection Time: 01/17/21  7:30 PM   Result Value Ref Range    Procalcitonin 0.20 (H) 0.01 - 0.09 ng/mL   Blood Gas, Arterial    Collection Time: 01/17/21  7:32 PM   Result Value Ref Range    pH, Blood Gas 7.45 7.35 - 7.45    PH (TEMPERATURE CORRECTED) 7.45 7.35 - 7.45    PCO2 29 (L) 35 - 45 mmhg    PCO2 (TEMP CORRECTED) 29 (L) 35 - 45    PO2 62 (L) 71 - 104 mmhg    PO2 (TEMP CORRECTED) 62 (L) 71 - 104    HCO3 20 (L) 23 - 28 mmol/l    Base Excess (Calculated) -3.0 (L) -2.5 - 2.5 mmol/l    O2 Sat 93 %    DEVICE NRB     Maciel Test N/A     Source: L Brach     COLLECTED BY: [id number]     Temperature 37.0          Vital Signs: T: 97.8F P: 76 RR: 15 B/P: 138/95: FiO2: 95%: O2 Sat:60L: I/O: No intake or output data in the 24 hours ending 01/17/21 2049      General:   No acute distress  HEENT:  normocephalic and atraumatic. No scleral icterus. PEARLA, mucous membranes moist  Neck: supple. Trachea midline. No JVD. Full ROM, no meningismus. Lungs: clear to diminished on auscultation. No retractions, no accessory muscle use. Cardiac: RRR, no murmur, 2+ pulses  Abdomen: soft. Nontender. Bowel sounds active  Extremities:  No clubbing, cyanosis x 4, no edema    Vasculature: capillary refill < 3 seconds. Skin:  warm and dry. no visible rashes  Psych:  Alert and oriented x3. Affect appropriate  Lymph:  No supraclavicular adenopathy. Neurologic:  CN II-XII grossly intact. No focal deficit. Data: (All radiographs, tracings, PFTs, and imaging are personally viewed and interpreted unless otherwise noted).  EKG: rhythm: normal sinus rhythm, rate=90 bpm, yh=409 ms, qrs=94 ms, ef=901 ms, axis=70 degrees        Electronically signed by  Olya King PA-C     Patients clinical record, labs and radiological imaging reviewed. I agree with clinical findings , provisional diagnosis and management.

## 2021-01-18 NOTE — ED NOTES
Vs reassessed. RR reg. PT back from CT pt new ATB started. Will continue to monitor. No distress noted.       Nancy Grande RN  01/17/21 4839

## 2021-01-18 NOTE — PROGRESS NOTES
Attempted to call daughter Kanwal, left voice mail at 09:30am.      Nursing staff used alcohol swabs to patient's bilateral nares this shift.

## 2021-01-18 NOTE — ED NOTES
Lab and respiratory at bedside to draw blood and place pt on high flow O2.      Celestine Carlos RN  01/1940

## 2021-01-18 NOTE — PROGRESS NOTES
Spoke with pt regarding which family member to provide updates to. Pt states he would like his daughter Kanwal to get the shift updates.

## 2021-01-18 NOTE — PROGRESS NOTES
[facility]           Progress Note    Patient Name:  Eric Pak  Medical Record Number:  [id number]  YOB: 1940    Date:  1/18/2021  Time:  1:39 PM  Completed By:  Ilene Morales RN    Reason for Palliative Care Evaluation: code status    Current Issues:  []  Pain  []  Fatigue  []  Nausea  []  Anxiety  []  Depression  [x]  Shortness of Breath  []  Constipation  []  Appetite  []  Other:    Advance Directives:none on file  [] [facility] DNR Form  [] Living Will  [] Medical POA    Current Code Status  [x] Full Resuscitation  [] DNR-Comfort Care-Arrest  [] DNR-Comfort Care  [] Limited   [] No CPR   [] No shock   [] No ET intubation/reintubation   [] No resuscitative medications   [] Other limitation:    Performance Status:  60    Family/Patient Discussion:  Patient resting in bed. Patient is currently on bipap 75%. Patient is alert and oriented to all spheres. Palliative care introduced. Discussion about code status levels and what each level entails. Discussed intubation with respiratory failure and discussed with covid, that journey is difficult with typically weeks on the ventilator (requiring trach/PEG) with months of rehab. At this time, patient wishes to maintain a full code status. Emotional support provided. Plan/Follow-Up:  Patient wishes to maintain a full code status. Please call palliative care if further needs arise.     Ilene Morales RN  1/18/2021,  1:39 PM

## 2021-01-18 NOTE — CARE COORDINATION
DISASTER CHARTING    1/18/21, 9:05 AM EST    DISCHARGE ONGOING EVALUATION:     Silvana Lombard day: 1  Location: [facility] Reason for admit: Acute hypoxemic respiratory failure due to COVID-19 ([facility] [address] [U07.1, J96.01]   Barriers to Discharge: Presented to ED, covid +. Currently on high flow 95%, 60L, planning bipap. Azithromycin. Rocephin. Decadron. Remdesivir started on 1/18, last anticipated dose on 1/23. PT/OT ordered. Palliative care consult. PCP: Erick Navarro MD  Readmission Risk Score: 13%  Patient Goals/Plan/Treatment Preferences: Patient is from home with his wife, will need meet and greet once appropriate. Spoke with nurse in report, patient is not tolerating HFO, planning bipap. well appearing

## 2021-01-18 NOTE — ED PROVIDER NOTES
[facility]  eMERGENCY dEPARTMENT eNCOUnter          [facility]       Chief Complaint   Patient presents with    Shortness of Breath    Cough       Nurses Notes reviewed and I agree except as noted inthe HPI. HISTORY OF PRESENT ILLNESS    Tammi Sheriff is a 79 y.o. male who presents to the Emergency Department for the evaluation of cough and shortness of breath. Patient reports gradual onset of symptoms over the past 5 days with significant worsening of the shortness of breath over the past 2 days. He reports unproductive cough associated with decreased sense of taste and smell and some nasal congestion over the past week. Denies any known sick contacts and has not been tested for Covid since symptom onset. He reports worsening of shortness of breath with exertion, improvement with rest.  Reports generalized weakness that occurs with activity. He has been taking Tylenol and was also started on vitamin C, vitamin D, zinc by his daughter earlier in the season. He denies any pulmonary history. He is not a smoker. He denies any associated fevers, chills, generalized body aches, headache, sore throat, vomiting, diarrhea, wheezing, edema, orthopnea or dizziness. The HPI was provided by the patient. REVIEW OF SYSTEMS     Review of Systems   Constitutional: Negative for chills and fever. HENT: Negative for sore throat. Eyes: Negative for visual disturbance. Respiratory: Positive for cough and shortness of breath. Cardiovascular: Negative for chest pain, palpitations and leg swelling. Gastrointestinal: Negative for abdominal pain, diarrhea and vomiting. Genitourinary: Negative for dysuria. Musculoskeletal: Negative for myalgias. Skin: Negative for color change. Neurological: Positive for weakness. Negative for syncope and headaches. PAST MEDICAL HISTORY    has a past medical history of Arthritis, Cancer ([address] Cancer ([facility] [address] and Hypertension.     SURGICAL HISTORY      has a past surgical history that includes Colonoscopy; skin biopsy; Kidney removal (); Mohs surgery (2018); pr office/outpt visit,procedure only (N/A, 3/16/2018); Mohs surgery (N/A, 2018); pr office/outpt visit,procedure only (N/A, 3/21/2018); Mohs surgery (2018); pr office/outpt visit,procedure only (N/A, 3/23/2018); Abdomen surgery (Bilateral, 2018); and ear surgery (Right, 2019). CURRENT MEDICATIONS       Previous Medications    AMLODIPINE (NORVASC) 5 MG TABLET    Take 5 mg by mouth daily    LOSARTAN (COZAAR) 100 MG TABLET    Take 100 mg by mouth daily    MULTIPLE VITAMINS-MINERALS (ICAPS AREDS 2 PO)    Take by mouth daily       ALLERGIES     has No Known Allergies. FAMILY HISTORY     He indicated that his mother is . He indicated that his father is . He indicated that the status of his son is unknown. He indicated that the status of his neg hx is unknown.   family history includes Diabetes in his son; High Blood Pressure in his father and mother; Stroke in his father. SOCIAL HISTORY      reports that he has never smoked. He has never used smokeless tobacco. He reports current alcohol use. He reports that he does not use drugs. PHYSICAL EXAM     INITIAL VITALS:  oral temperature is 97.8 °F (36.6 °C). His blood pressure is 138/95 (abnormal) and his pulse is 76. His respiration is 15 and oxygen saturation is 95%. Physical Exam  Vitals signs and nursing note reviewed. Constitutional:       Appearance: He is well-developed. HENT:      Head: Normocephalic and atraumatic. Neck:      Musculoskeletal: Normal range of motion. Cardiovascular:      Rate and Rhythm: Normal rate and regular rhythm. Heart sounds: Normal heart sounds. No murmur. Pulmonary:      Effort: Tachypnea and accessory muscle usage present. Breath sounds: Rhonchi present. No decreased breath sounds or wheezing.       Comments: Patient on 15 L nonrebreather mask with mild tachypnea and mild accessory muscle usage but reporting improvement in his shortness of breath. Musculoskeletal:      Right lower leg: He exhibits no tenderness. Edema (1+) present. Left lower leg: He exhibits no tenderness. Edema (1+) present. Skin:     General: Skin is warm. Neurological:      General: No focal deficit present. Mental Status: He is alert. Psychiatric:         Mood and Affect: Mood normal.         DIFFERENTIAL DIAGNOSIS:   Differential diagnoses are discussed    DIAGNOSTIC RESULTS     EKG: All EKG's are interpreted by the Emergency Department Physician who either signs or Co-signsthis chart in the absence of a cardiologist.    Vent. Rate: 90 bpm  PRinterval: 182 ms  QRS duration: 94 ms  QTc: 437 ms  P-R-T axes: 70, -30, 63  Sinus rhythm with PVC. No STEMI. Compared to old EKG on 3-1-18      RADIOLOGY: non-plain film images(s) such as CT, Ultrasound and MRI are read by the radiologist.    [address] [place] [address]   Final Result   Impression:   1. No pulmonary emboli. 2.  No thoracic aortic aneurysm or dissection   3. Bilateral perihilar and peripheral airspace disease which may    represent pulmonary edema versus atypical viral pneumonia. 4.  Small bilateral pleural fluid collections   5. Multiple hepatic hypodensities are indeterminate and may represent    cysts, hemangiomas or metastatic disease. Nonemergent multiphase imaging    of the entire liver can be performed for further evaluation      This document has been electronically signed by: Britton Paris MD on    01/17/2021 09:04 PM      All CT scans at this facility use dose modulation, iterative    reconstruction, and/or weight-based   dosing when appropriate to reduce radiation dose to as low as reasonably    achievable. XR CHEST PORTABLE   Final Result   Impression:   1. Perihilar opacities may represent pulmonary edema versus atypical    pneumonia.       This document has been electronically components within normal limits   OSMOLALITY - Abnormal; Notable for the following components:    Osmolality Calc 254.2 (*)     All other components within normal limits   CULTURE, BLOOD 1   CULTURE, BLOOD 2   LIPASE   PROTIME-INR   TROPONIN   LACTIC ACID, PLASMA   ANION GAP   GLOMERULAR FILTRATION RATE, ESTIMATED       EMERGENCY DEPARTMENT COURSE:   Vitals:    Vitals:    01/17/21 1911 01/17/21 1930 01/1939 01/17/21 2037   BP: 106/70   (!) 138/95   Pulse: 77   76   Resp: 24 (!) 31 18 15   Temp:       TempSrc:       SpO2: 94% 95% 99% 95%      7:59 PM EST: The patient was seen and evaluated. Patient presents for complaints of shortness of breath and cough that of been ongoing for the past 5 days, worsening. He arrives 67% on oxygen and quickly improved to the low 90s on 15 L nonrebreather mask. He had some persistent mild tachypnea and accessory muscle use but reported significant improvement in his symptoms. He was transferred over to high flow nasal cannula with further improvement in oxygen saturation down to 99%. Covid positive. Chest x-ray showing multifocal pneumonia. He was started on IV fluids with Decadron, Rocephin, doxycycline. Hyponatremia noted with hypocalcemia. LDH, CRP, D-dimer elevated. CTA ordered. This reveals no pulmonary embolism. It does show bilateral perihilar and peripheral airspace disease as well as small bilateral pleural fluid collections and multiple hepatic hypodensities. Discussed the case with the hospitalist service will admit the patient for further management. Results discussed with the patient who is agreeable with the above plan. CRITICAL CARE:   There was a high probability of clinically significant life threatening deterioration in this patient's condition which required the urgent intervention of the Midlevel provider. Total critical care time was 20 minutes. This excludes any time for separately reportable procedures.       CONSULTS:  Hospitalist PROCEDURES:  None    FINAL IMPRESSION      1. Acute hypoxemic respiratory failure due to COVID-19 [facility])          DISPOSITION/PLAN   Admit    PATIENT REFERRED TO:  No follow-up provider specified.     DISCHARGEMEDICATIONS:  New Prescriptions    No medications on file       (Please note that portions of this note were completedwith a voice recognition program.  Efforts were made to edit the dictations but occasionally words are mis-transcribed.)        Jenny Roman PA-C  01/17/21 1969

## 2021-01-18 NOTE — H&P
**This is a Medical/ PA/ APRN Student Note and is charted for educational purposes. The non-physician staff attested note is not to be used for billing purposes or to guide patient care. Please see the physician modifications/ attestation for treatment plan/suggestions. This note has been reviewed and feedback has been provided to the student. *      Hospitalist - History & Physical      Patient: Pancho Mendes    Unit/Bed:[facility]/005-A  YOB: 1940  MRN: [id number]   Acct: [de-identified]   PCP: Jannet Martinez MD      Assessment and Plan:        1. Acute hypoxic respiratory failure due to Covid-19: O2 titration protocol. Begin zinc, vitamin C, vitamin D, aspirin, lovenox, remdesivir, convalescent plasma - patient consent obtained. 2. Pneumonia: continue azithromycin and rocephin. 3. Essential hypertension: continue home medications, monitor for hypotension. CC:  Shortness of breath, cough  HPI: Patient is an 79year old male who presented to the ED with shortness of breath and a cough and was found to be severely hypoxic with an O2 saturation of 67%. Patient stated that he began to feel \"off\" around 1/12 and stated that he began to not want to eat due to the taste. He has also noticed a cough during the same period. Patient stated he thought he had a cold and thought nothing else of his cough and lack of desire to eat. Patient denies any fever, chills, or abdominal pain. Patient is not a smoker and has no pulmonary history. Patient has been admitted to the Covid step-down unit. ROS: Review of Systems   Constitutional: Negative for chills and fever. HENT: Negative for congestion. Eyes: Negative. Respiratory: Positive for cough and shortness of breath. Cardiovascular: Negative. Gastrointestinal: Negative. Endocrine: Negative. Genitourinary: Negative. Musculoskeletal: Negative. Skin: Negative. Allergic/Immunologic: Negative. Neurological: Negative.     Hematological: Negative. Psychiatric/Behavioral: Negative.       PMH:    Past Medical History:   Diagnosis Date    Arthritis     Cancer ([facility] [address] 1977    kidney, right - surgery & radiation    Cancer ([facility] [address]     skin    Hypertension      SHX:    Social History     Socioeconomic History    Marital status:      Spouse name: Steven Valencia Number of children: 3    Years of education: Not on file    Highest education level: Not on file   Occupational History    Not on file   Social Needs    Financial resource strain: Not on file    Food insecurity     Worry: Not on file     Inability: Not on file   [employer] needs     Medical: Not on file     Non-medical: Not on file   Tobacco Use    Smoking status: Never Smoker    Smokeless tobacco: Never Used   Substance and Sexual Activity    Alcohol use: Yes     Comment: occasional 3-4 drinks per week    Drug use: No    Sexual activity: Not on file   Lifestyle    Physical activity     Days per week: Not on file     Minutes per session: Not on file    Stress: Not on file   Relationships    Social connections     Talks on phone: Not on file     Gets together: Not on file     Attends Congregational service: Not on file     Active member of club or organization: Not on file     Attends meetings of clubs or organizations: Not on file     Relationship status: Not on file    Intimate partner violence     Fear of current or ex partner: Not on file     Emotionally abused: Not on file     Physically abused: Not on file     Forced sexual activity: Not on file   Other Topics Concern    Not on file   Social History Narrative    Not on file     FHX:   Family History   Problem Relation Age of Onset    High Blood Pressure Mother     High Blood Pressure Father     Stroke Father     Diabetes Son     Cancer Neg Hx     Heart Disease Neg Hx      Allergies: No Known Allergies  Medications:     sodium chloride        sodium chloride  1,000 mL Intravenous Once    [START ON 1/18/2021] amLODIPine  5 mg Oral Daily    [START ON 1/18/2021] losartan  100 mg Oral Daily    sodium chloride flush  10 mL Intravenous 2 times per day    enoxaparin  40 mg Subcutaneous BID    [START ON 1/18/2021] zinc sulfate  50 mg Oral Daily    [START ON 1/18/2021] Vitamin D  1,000 Units Oral Daily    [START ON 1/18/2021] ascorbic acid  500 mg Oral Daily    [START ON 1/18/2021] aspirin  81 mg Oral Daily    [START ON 1/18/2021] cefTRIAXone (ROCEPHIN) IV  1 g Intravenous Q24H    [START ON 1/18/2021] azithromycin  500 mg Intravenous Q24H    [START ON 1/18/2021] dexamethasone  6 mg Oral Daily         sodium chloride flush, 10 mL, PRN      promethazine, 12.5 mg, Q6H PRN    Or      ondansetron, 4 mg, Q6H PRN      polyethylene glycol, 17 g, Daily PRN      acetaminophen, 650 mg, Q6H PRN    Or      acetaminophen, 650 mg, Q6H PRN      sodium chloride, , PRN      potassium chloride, 40 mEq, PRN    Or      potassium alternative oral replacement, 40 mEq, PRN    Or      potassium chloride, 10 mEq, PRN      magnesium sulfate, 2 g, PRN        Labs:   Recent Results (from the past 24 hour(s))   EKG SOB    Collection Time: 01/17/21  6:33 PM   Result Value Ref Range    Ventricular Rate 90 BPM    Atrial Rate 91 BPM    P-R Interval 182 ms    QRS Duration 94 ms    Q-T Interval 358 ms    QTc Calculation (Bazett) 437 ms    P Axis 70 degrees    R Axis -30 degrees    T Axis 63 degrees   CBC auto differential    Collection Time: 01/17/21  6:40 PM   Result Value Ref Range    WBC 7.7 4.8 - 10.8 thou/mm3    RBC 3.89 (L) 4.70 - 6.10 mill/mm3    Hemoglobin 13.2 (L) 14.0 - 18.0 gm/dl    Hematocrit 37.1 (L) 42.0 - 52.0 %    MCV 95.4 (H) 80.0 - 94.0 fL    MCH 33.9 (H) 26.0 - 33.0 pg    MCHC 35.6 (H) 32.2 - 35.5 gm/dl    RDW-CV 12.3 11.5 - 14.5 %    RDW-SD 43.1 35.0 - 45.0 fL    Platelets 016 142 - 970 thou/mm3    MPV 9.3 (L) 9.4 - 12.4 fL    Seg Neutrophils 87.9 %    Lymphocytes 4.8 %    Monocytes 6.8 %    Eosinophils 0.0 %    Basophils 0.1 %    Immature Granulocytes 0.4 %    Segs Absolute 6.8 1.8 - 7.7 thou/mm3    Lymphocytes Absolute 0.4 (L) 1.0 - 4.8 thou/mm3    Monocytes Absolute 0.5 0.4 - 1.3 thou/mm3    Eosinophils Absolute 0.0 0.0 - 0.4 thou/mm3    Basophils Absolute 0.0 0.0 - 0.1 thou/mm3    Immature Grans (Abs) 0.03 0.00 - 0.07 thou/mm3    nRBC 0 /100 wbc   Basic Metabolic Panel    Collection Time: 01/17/21  6:40 PM   Result Value Ref Range    Sodium 124 (L) 135 - 145 meq/L    Potassium 3.9 3.5 - 5.2 meq/L    Chloride 90 (L) 98 - 111 meq/L    CO2 19 (L) 23 - 33 meq/L    Glucose 160 (H) 70 - 108 mg/dL    BUN 16 7 - 22 mg/dL    CREATININE 0.7 0.4 - 1.2 mg/dL    Calcium 7.9 (L) 8.5 - 10.5 mg/dL   Lipase    Collection Time: 01/17/21  6:40 PM   Result Value Ref Range    Lipase 19.0 5.6 - 51.3 U/L   Hepatic function panel    Collection Time: 01/17/21  6:40 PM   Result Value Ref Range    Alb 3.2 (L) 3.5 - 5.1 g/dL    Total Bilirubin 0.6 0.3 - 1.2 mg/dL    Bilirubin, Direct <0.2 0.0 - 0.3 mg/dL    Alkaline Phosphatase 80 38 - 126 U/L    AST 43 (H) 5 - 40 U/L    ALT 18 11 - 66 U/L    Total Protein 6.6 6.1 - 8.0 g/dL   APTT    Collection Time: 01/17/21  6:40 PM   Result Value Ref Range    aPTT 20.5 (L) 22.0 - 38.0 seconds   Protime-INR    Collection Time: 01/17/21  6:40 PM   Result Value Ref Range    INR 1.12 0.85 - 1.13   Troponin    Collection Time: 01/17/21  6:40 PM   Result Value Ref Range    Troponin T < 0.010 ng/ml   Anion Gap    Collection Time: 01/17/21  6:40 PM   Result Value Ref Range    Anion Gap 15.0 8.0 - 16.0 meq/L   Glomerular Filtration Rate, Estimated    Collection Time: 01/17/21  6:40 PM   Result Value Ref Range    Est, Glom Filt Rate >90 ml/min/1.73m2   Osmolality    Collection Time: 01/17/21  6:40 PM   Result Value Ref Range    Osmolality Calc 254.2 (L) 275.0 - 300.0 mOsmol/kg   COVID-19    Collection Time: 01/17/21  7:15 PM   Result Value Ref Range    SARS-CoV-2, NAAT DETECTED (AA) NOT DETECTED   D-dimer, quantitative Collection Time: 01/17/21  7:30 PM   Result Value Ref Range    D-Dimer, Quant 925.00 (H) 0.00 - 500.00 ng/ml FEU   Ferritin    Collection Time: 01/17/21  7:30 PM   Result Value Ref Range    Ferritin 1,770 (H) 22 - 322 ng/mL   C-reactive protein    Collection Time: 01/17/21  7:30 PM   Result Value Ref Range    CRP 9.62 (H) 0.00 - 1.00 mg/dl   Lactate Dehydrogenase    Collection Time: 01/17/21  7:30 PM   Result Value Ref Range     (H) 100 - 190 U/L   Lactic acid, plasma    Collection Time: 01/17/21  7:30 PM   Result Value Ref Range    Lactic Acid 1.5 0.5 - 2.2 mmol/L   Procalcitonin    Collection Time: 01/17/21  7:30 PM   Result Value Ref Range    Procalcitonin 0.20 (H) 0.01 - 0.09 ng/mL   Blood Gas, Arterial    Collection Time: 01/17/21  7:32 PM   Result Value Ref Range    pH, Blood Gas 7.45 7.35 - 7.45    PH (TEMPERATURE CORRECTED) 7.45 7.35 - 7.45    PCO2 29 (L) 35 - 45 mmhg    PCO2 (TEMP CORRECTED) 29 (L) 35 - 45    PO2 62 (L) 71 - 104 mmhg    PO2 (TEMP CORRECTED) 62 (L) 71 - 104    HCO3 20 (L) 23 - 28 mmol/l    Base Excess (Calculated) -3.0 (L) -2.5 - 2.5 mmol/l    O2 Sat 93 %    DEVICE NRB     Maciel Test N/A     Source: [facility]     COLLECTED BY: [id number]     Temperature 37.0          Vital Signs: T: 98.3F P: 82 RR: 34 B/P: 133/74: FiO2: 90: O2 Sat:92%: I/O: No intake or output data in the 24 hours ending 01/17/21 3472      General:   Well nourished, appears his stated age, no acute distress  HEENT:  normocephalic and atraumatic. No scleral icterus. PEARLA, mucous membranes dry  Neck: supple. Trachea midline. No JVD. Full ROM, no meningismus. Lungs: clear to diminished auscultation. No retractions, no accessory muscle use. Cardiac: RRR, no murmur, 2+ pulses  Abdomen: soft. Nontender. Bowel sounds active  Extremities:  No clubbing, cyanosis x 4, no edema    Vasculature: capillary refill < 3 seconds. Skin:  warm and dry. no visible rashes  Psych:  Alert and oriented x3.   Affect appropriate  Lymph:  No supraclavicular adenopathy. Neurologic:  CN II-XII grossly intact. No focal deficit. Data: (All radiographs, tracings, PFTs, and imaging are personally viewed and interpreted unless otherwise noted).  EKG: rhythm: normal sinus rhythm, rate=91 bpm, ex=566 ms, qrs=94 ms, gl=929 ms, axis=-30 degrees      Electronically signed by  Norberto Ross                                       **This is a Medical/ PA/ APRN Student Note and is charted for educational purposes. The non-physician staff attested note is not to be used for billing purposes or to guide patient care. Please see the physician modifications/ attestation for treatment plan/suggestions. This note has been reviewed and feedback has been provided to the student.  *

## 2021-01-18 NOTE — PROGRESS NOTES
Hospitalist Progress Note      Patient:  [facility]ammi [facility]    Unit/Bed:4B-05/005-A  YOB: 1940  MRN: [id number]   Acct: [de-identified]   PCP: Shelia Morejon MD  Date of Admission: 1/17/2021    Assessment/Plan:    1. Acute hypoxic respiratory failure due to Covid-19 PNA: on HFO fiO2 95% at 60 lpm w/ SaO2 90%. On Dexamethasone (added PPI), Remdesivir, zinc, vitamin C, vitamin D, aspirin, LMWH BID dosing, s/p convalescent plasma; procal elevated; on Azirtho/ceftriaxone for possible superimposed bacterial PNA, prothrombotic levels elevated; CTPE negative; added ASA 81 QD. IS/Acapella/PT to see. Wean supplemental O2 as tolerated to SpO2 > 90%  2. HypoNa: Na 124 on arrival, no prior to compare; likely hypovolemic d/t inadequate PO intake; pt received 1.0 L NS bolus in ED. Started on NS at 100 cc/hr; will trend serum Na to reassess response; urine lytes/Osm sent as well. 3. Essential hypertension: continue home medications, will monitor. 4. Hx of RCC: s/p R nephrectomy  5. Code status: Full code; palliative care to see  6. PT/OT to see    Chief Complaint: SOB    Initial H and P:-    Admitted for management of Acute hypoxic respiratory failure secondary to COVID 19      Subjective (past 24 hours):   Feeling improved. SOB improved. Denies CP/fever/chills/palpitation/ or GI symptoms      Past medical history, family history, social history and allergies reviewed again and is unchanged since admission. ROS (12 point review of systems completed. Pertinent positives noted.  Otherwise ROS is negative)     Medications:  Reviewed    Infusion Medications    sodium chloride       Scheduled Medications    [START ON 1/19/2021] remdesivir IVPB  100 mg Intravenous Q24H    amLODIPine  5 mg Oral Daily    losartan  100 mg Oral Daily    sodium chloride flush  10 mL Intravenous 2 times per day    enoxaparin  40 mg Subcutaneous Q12H    zinc sulfate  50 mg Oral Daily    Vitamin D  1,000 Units Oral Daily    ascorbic acid  500 mg Oral Daily    aspirin  81 mg Oral Daily    cefTRIAXone (ROCEPHIN) IV  1 g Intravenous Q24H    azithromycin  500 mg Intravenous Q24H    dexamethasone  6 mg Oral Daily     PRN Meds: sodium chloride, sodium chloride flush, promethazine **OR** ondansetron, polyethylene glycol, acetaminophen **OR** acetaminophen, sodium chloride, potassium chloride **OR** potassium alternative oral replacement **OR** potassium chloride, magnesium sulfate      Intake/Output Summary (Last 24 hours) at 1/18/2021 0707  Last data filed at 1/18/2021 0424  Gross per 24 hour   Intake 2093.09 ml   Output 450 ml   Net 1643.09 ml       Diet:  DIET GENERAL;    Exam:  /77   Pulse 72   Temp 98.6 °F (37 °C) (Oral)   Resp 24   Ht 5' 6\" (1.676 m)   Wt 182 lb 4.8 oz (82.7 kg)   SpO2 90%   BMI 29.42 kg/m²   General appearance: No apparent distress, appears stated age and cooperative. HEENT: Pupils equal, round, and reactive to light. Conjunctivae/corneas clear. Neck: Supple, with full range of motion. No jugular venous distention. Trachea midline. Respiratory:  Diminished A/E at lung bases w/ scattered upper airway sounds. Cardiovascular: Regular rate and rhythm with normal S1/S2 without murmurs, rubs or gallops. Abdomen: Soft, non-tender, non-distended with normal bowel sounds. Musculoskeletal: passive and active ROM x 4 extremities. Skin: Skin color, texture, turgor normal.  No rashes or lesions. Neurologic:  Neurovascularly intact without any focal sensory/motor deficits.  Cranial nerves: II-XII intact, grossly non-focal.  Psychiatric: Alert and oriented, thought content appropriate, normal insight  Capillary Refill: Brisk,< 3 seconds   Peripheral Pulses: +2 palpable, equal bilaterally     Labs:   Recent Labs     01/17/21  1840 01/18/21  0626   WBC 7.7 6.2   HGB 13.2* 11.9*   HCT 37.1* 34.7*    201     Recent Labs     01/17/21  1840   *   K 3.9   CL 90* CO2 19*   BUN 16   CREATININE 0.7   CALCIUM 7.9*     Recent Labs     01/17/21  1840   AST 43*   ALT 18   BILIDIR <0.2   BILITOT 0.6   ALKPHOS 80     Recent Labs     01/17/21  1840   INR 1.12     No results for input(s): Bobo Speaks in the last 72 hours. Microbiology:    Blood culture #1: No results found for: [facility]    Blood culture #2:No results found for: Marcelino Gustavo    Organism:  Lab Results   Component Value Date    ORG Enterobacter cloacae complex 04/03/2018    ORG Candida parapsilosis 04/03/2018    ORG Pseudomonas aeruginosa 04/03/2018         Lab Results   Component Value Date    LABGRAM  04/03/2018     Few segmented neutrophils observed. Rare epithelial cells observed. Rare budding yeast.         MRSA culture only:No results found for: [facility]    Urine culture: No results found for: LABURIN    Respiratory culture: No results found for: CULTRESP    Aerobic and Anaerobic :  Lab Results   Component Value Date    LABAERO  04/03/2018     No growth-preliminary  At least one isolate is resistant in vitro to current  regimen. LABAERO  04/03/2018     very light growth  Enterobacter species which may be initially susceptible to  third generation cephalosporins may develop resistance within  three to four days of initiation of this antimicrobial  therapy. LABAERO light growth 04/03/2018    LABAERO very light growth 04/03/2018     Lab Results   Component Value Date    LABANAE  04/03/2018     No anaerobes isolated- preliminary  No anaerobes isolated         Urinalysis:    No results found for: [facility] Gallus, BACTERIA, RBCUA, BLOODU, SPECGRAV, GLUCOSEU    Radiology:  CTA CHEST W WO CONTRAST   Final Result   Impression:   1. No pulmonary emboli. 2.  No thoracic aortic aneurysm or dissection   3. Bilateral perihilar and peripheral airspace disease which may    represent pulmonary edema versus atypical viral pneumonia. 4.  Small bilateral pleural fluid collections   5.   Multiple hepatic hypodensities are indeterminate and may represent    cysts, hemangiomas or metastatic disease. Nonemergent multiphase imaging    of the entire liver can be performed for further evaluation      This document has been electronically signed by: Ezra Agee MD on    01/17/2021 09:04 PM      All CT scans at this facility use dose modulation, iterative    reconstruction, and/or weight-based   dosing when appropriate to reduce radiation dose to as low as reasonably    achievable. XR CHEST PORTABLE   Final Result   Impression:   1. Perihilar opacities may represent pulmonary edema versus atypical    pneumonia. This document has been electronically signed by: Ezra Agee MD on    01/17/2021 09:03 PM           Cta Chest W Wo Contrast    Result Date: 1/17/2021  Exam:  CTA chest with IV contrast. Procedure: MIP reconstructions are performed Comparison: Chest x-ray 1/17/2021 Findings: No pulmonary emboli. No thoracic aortic aneurysm or dissection. No hilar or mediastinal adenopathy. No pericardial fluid collection. No pneumothorax or pneumomediastinum. Bilateral perihilar and peripheral airspace disease may represent pulmonary edema versus atypical viral pneumonia. Small bilateral pleural fluid collections. Small hiatal hernia. Calcifications in the spleen and right hilar lymph nodes, consistent with previous granulomatous disease. Multiple hepatic hypodensities are indeterminate and may represent cysts, hemangiomas or metastatic disease. No thoracic compression fracture. Impression: 1. No pulmonary emboli. 2.  No thoracic aortic aneurysm or dissection 3. Bilateral perihilar and peripheral airspace disease which may represent pulmonary edema versus atypical viral pneumonia. 4.  Small bilateral pleural fluid collections 5. Multiple hepatic hypodensities are indeterminate and may represent cysts, hemangiomas or metastatic disease.   Nonemergent multiphase imaging of the entire liver can be performed for further evaluation This document has been electronically signed by: Shoshana Vargas MD on 01/17/2021 09:04 PM All CT scans at this facility use dose modulation, iterative reconstruction, and/or weight-based dosing when appropriate to reduce radiation dose to as low as reasonably achievable. Xr Chest Portable    Result Date: 1/17/2021  Exam: One View of the chest Comparison: 4/4/2018 Findings: New perihilar opacities may represent pulmonary edema versus atypical pneumonia. Cardiac silhouette is not enlarged. No pneumothorax. No pleural fluid collection. Old left-sided rib fractures     Impression: 1. Perihilar opacities may represent pulmonary edema versus atypical pneumonia. This document has been electronically signed by: Shoshana Vargas MD on 01/17/2021 09:03 PM     With RN in room, patient was updated about and agreed upon the treatment plan, all the questions and concerns were addressed. Patient was seen in PPE (PERSONAL PROTECTIVE EQUIPMENT). Patient was interviewed in Strict Airborne and Droplet Precautions.     Electronically signed by Urvashi Brower MD on 1/18/2021 at 7:07 AM

## 2021-01-19 LAB
ALBUMIN SERPL-MCNC: 2.8 G/DL (ref 3.5–5.1)
ALLEN TEST: POSITIVE
ALP BLD-CCNC: 82 U/L (ref 38–126)
ALT SERPL-CCNC: 17 U/L (ref 11–66)
ANION GAP SERPL CALCULATED.3IONS-SCNC: 10 MEQ/L (ref 8–16)
AST SERPL-CCNC: 35 U/L (ref 5–40)
BASE EXCESS (CALCULATED): -3.3 MMOL/L (ref -2.5–2.5)
BASOPHILS # BLD: 0.1 %
BASOPHILS ABSOLUTE: 0 THOU/MM3 (ref 0–0.1)
BILIRUB SERPL-MCNC: 0.4 MG/DL (ref 0.3–1.2)
BILIRUBIN DIRECT: < 0.2 MG/DL (ref 0–0.3)
BUN BLDV-MCNC: 16 MG/DL (ref 7–22)
CALCIUM SERPL-MCNC: 7.7 MG/DL (ref 8.5–10.5)
CHLORIDE BLD-SCNC: 99 MEQ/L (ref 98–111)
CO2: 22 MEQ/L (ref 23–33)
COLLECTED BY:: ABNORMAL
CREAT SERPL-MCNC: 0.5 MG/DL (ref 0.4–1.2)
DEVICE: ABNORMAL
EOSINOPHIL # BLD: 0 %
EOSINOPHILS ABSOLUTE: 0 THOU/MM3 (ref 0–0.4)
ERYTHROCYTE [DISTWIDTH] IN BLOOD BY AUTOMATED COUNT: 12.6 % (ref 11.5–14.5)
ERYTHROCYTE [DISTWIDTH] IN BLOOD BY AUTOMATED COUNT: 44.7 FL (ref 35–45)
GFR SERPL CREATININE-BSD FRML MDRD: > 90 ML/MIN/1.73M2
GLUCOSE BLD-MCNC: 126 MG/DL (ref 70–108)
HCO3: 20 MMOL/L (ref 23–28)
HCT VFR BLD CALC: 35.9 % (ref 42–52)
HEMOGLOBIN: 12.3 GM/DL (ref 14–18)
IFIO2: 65
IMMATURE GRANS (ABS): 0.06 THOU/MM3 (ref 0–0.07)
IMMATURE GRANULOCYTES: 0.5 %
LYMPHOCYTES # BLD: 3.7 %
LYMPHOCYTES ABSOLUTE: 0.4 THOU/MM3 (ref 1–4.8)
MCH RBC QN AUTO: 33.2 PG (ref 26–33)
MCHC RBC AUTO-ENTMCNC: 34.3 GM/DL (ref 32.2–35.5)
MCV RBC AUTO: 96.8 FL (ref 80–94)
MODE: ABNORMAL
MONOCYTES # BLD: 4 %
MONOCYTES ABSOLUTE: 0.5 THOU/MM3 (ref 0.4–1.3)
NUCLEATED RED BLOOD CELLS: 0 /100 WBC
O2 SATURATION: 95 %
PCO2: 30 MMHG (ref 35–45)
PH BLOOD GAS: 7.44 (ref 7.35–7.45)
PLATELET # BLD: 271 THOU/MM3 (ref 130–400)
PMV BLD AUTO: 9.5 FL (ref 9.4–12.4)
PO2: 71 MMHG (ref 71–104)
POTASSIUM SERPL-SCNC: 4.7 MEQ/L (ref 3.5–5.2)
RBC # BLD: 3.71 MILL/MM3 (ref 4.7–6.1)
REASON FOR REJECTION: NORMAL
REJECTED TEST: NORMAL
SEG NEUTROPHILS: 91.7 %
SEGMENTED NEUTROPHILS ABSOLUTE COUNT: 10.5 THOU/MM3 (ref 1.8–7.7)
SET PEEP: 10 MMHG
SET PRESS SUPP: 8 CMH2O
SODIUM BLD-SCNC: 123 MEQ/L (ref 135–145)
SODIUM BLD-SCNC: 129 MEQ/L (ref 135–145)
SODIUM BLD-SCNC: 131 MEQ/L (ref 135–145)
SODIUM BLD-SCNC: 131 MEQ/L (ref 135–145)
SOURCE, BLOOD GAS: ABNORMAL
TOTAL PROTEIN: 6 G/DL (ref 6.1–8)
WBC # BLD: 11.4 THOU/MM3 (ref 4.8–10.8)

## 2021-01-19 PROCEDURE — 97530 THERAPEUTIC ACTIVITIES: CPT

## 2021-01-19 PROCEDURE — 82803 BLOOD GASES ANY COMBINATION: CPT

## 2021-01-19 PROCEDURE — 80053 COMPREHEN METABOLIC PANEL: CPT

## 2021-01-19 PROCEDURE — 99233 SBSQ HOSP IP/OBS HIGH 50: CPT | Performed by: HOSPITALIST

## 2021-01-19 PROCEDURE — 6360000002 HC RX W HCPCS: Performed by: PHYSICIAN ASSISTANT

## 2021-01-19 PROCEDURE — 97110 THERAPEUTIC EXERCISES: CPT

## 2021-01-19 PROCEDURE — 6370000000 HC RX 637 (ALT 250 FOR IP): Performed by: PHYSICIAN ASSISTANT

## 2021-01-19 PROCEDURE — 84295 ASSAY OF SERUM SODIUM: CPT

## 2021-01-19 PROCEDURE — 82248 BILIRUBIN DIRECT: CPT

## 2021-01-19 PROCEDURE — 36600 WITHDRAWAL OF ARTERIAL BLOOD: CPT

## 2021-01-19 PROCEDURE — 2500000003 HC RX 250 WO HCPCS: Performed by: INTERNAL MEDICINE

## 2021-01-19 PROCEDURE — 94761 N-INVAS EAR/PLS OXIMETRY MLT: CPT

## 2021-01-19 PROCEDURE — 97535 SELF CARE MNGMENT TRAINING: CPT

## 2021-01-19 PROCEDURE — 6370000000 HC RX 637 (ALT 250 FOR IP): Performed by: HOSPITALIST

## 2021-01-19 PROCEDURE — 2700000000 HC OXYGEN THERAPY PER DAY

## 2021-01-19 PROCEDURE — 2060000000 HC ICU INTERMEDIATE R&B

## 2021-01-19 PROCEDURE — 2580000003 HC RX 258: Performed by: HOSPITALIST

## 2021-01-19 PROCEDURE — 36415 COLL VENOUS BLD VENIPUNCTURE: CPT

## 2021-01-19 PROCEDURE — 97165 OT EVAL LOW COMPLEX 30 MIN: CPT

## 2021-01-19 PROCEDURE — 85025 COMPLETE CBC W/AUTO DIFF WBC: CPT

## 2021-01-19 PROCEDURE — 2580000003 HC RX 258: Performed by: PHYSICIAN ASSISTANT

## 2021-01-19 PROCEDURE — 2580000003 HC RX 258: Performed by: INTERNAL MEDICINE

## 2021-01-19 PROCEDURE — 94660 CPAP INITIATION&MGMT: CPT

## 2021-01-19 PROCEDURE — 94669 MECHANICAL CHEST WALL OSCILL: CPT

## 2021-01-19 RX ADMIN — REMDESIVIR 100 MG: 100 INJECTION, POWDER, LYOPHILIZED, FOR SOLUTION INTRAVENOUS at 04:00

## 2021-01-19 RX ADMIN — SODIUM CHLORIDE: 9 INJECTION, SOLUTION INTRAVENOUS at 23:20

## 2021-01-19 RX ADMIN — CEFTRIAXONE SODIUM 1 G: 1 INJECTION, POWDER, FOR SOLUTION INTRAMUSCULAR; INTRAVENOUS at 20:07

## 2021-01-19 RX ADMIN — OXYCODONE HYDROCHLORIDE AND ACETAMINOPHEN 500 MG: 500 TABLET ORAL at 08:36

## 2021-01-19 RX ADMIN — PANTOPRAZOLE SODIUM 40 MG: 40 TABLET, DELAYED RELEASE ORAL at 05:15

## 2021-01-19 RX ADMIN — Medication 50 MG: at 08:36

## 2021-01-19 RX ADMIN — ENOXAPARIN SODIUM 40 MG: 40 INJECTION SUBCUTANEOUS at 23:20

## 2021-01-19 RX ADMIN — ASPIRIN 81 MG: 81 TABLET, CHEWABLE ORAL at 08:42

## 2021-01-19 RX ADMIN — AZITHROMYCIN DIHYDRATE 500 MG: 500 INJECTION, POWDER, LYOPHILIZED, FOR SOLUTION INTRAVENOUS at 23:20

## 2021-01-19 RX ADMIN — SODIUM CHLORIDE: 9 INJECTION, SOLUTION INTRAVENOUS at 06:07

## 2021-01-19 RX ADMIN — LOSARTAN POTASSIUM 100 MG: 100 TABLET, FILM COATED ORAL at 08:35

## 2021-01-19 RX ADMIN — Medication 1000 UNITS: at 08:35

## 2021-01-19 RX ADMIN — ENOXAPARIN SODIUM 40 MG: 40 INJECTION SUBCUTANEOUS at 13:05

## 2021-01-19 RX ADMIN — DEXAMETHASONE 6 MG: 4 TABLET ORAL at 08:36

## 2021-01-19 RX ADMIN — AMLODIPINE BESYLATE 5 MG: 5 TABLET ORAL at 08:36

## 2021-01-19 ASSESSMENT — PAIN SCALES - GENERAL: PAINLEVEL_OUTOF10: 0

## 2021-01-19 NOTE — PROGRESS NOTES
BiPAP with settings 65%, PEEP 12 1/19 HFNC 60 L at 93%    SUBJECTIVE: Pt resting in bed and RN approved session with request to closely monitor O2 sat. PAIN: denies      OBJECTIVE:  Bed Mobility:  Supine to Sit: Stand By Assistance  Sit to Supine: Stand By Assistance     Transfers:  Sit to Stand: Contact Guard Assistance  Stand to [employer] Assistance    Ambulation:  Contact Guard Assistance  Distance: 4 ft fwd/bwd and limited by HFNC tubing  Surface: Level Tile  Device:Hand-Held Assist  Gait Deviations:  Decreased Step Length Bilaterally, Decreased Gait Speed and Decreased Heel Strike Bilaterally    Balance:  Static Sitting Balance:  Modified Independent  Dynamic Sitting Balance: Supervision  Static Standing Balance: Stand By Assistance  Dynamic Standing Balance: Contact Guard Assistance    Exercise:  Patient was guided in 1 set(s) 10 reps of exercise to both lower extremities. Ankle pumps, Glut sets, Quad sets, Heelslides, Hip abduction/adduction, Seated marches and Long arc quads. Exercises were completed for increased independence with functional mobility. Functional Outcome Measures: Completed  AM-PAC Inpatient Mobility Raw Score : 18  AM-PAC Inpatient T-Scale Score : 43.63    ASSESSMENT:  Assessment: Patient progressing toward established goals. and maintained O2 generally >88% with 2 brief drops to 86% with quick recovery to 88%  Activity Tolerance:  Patient tolerance of  treatment: good. Note O2 levels above     Equipment Recommendations: Other: monitor for needs - poss RW at discharge?   Discharge Recommendations:   Continue to assess pending progress    Plan: Times per week: 4-5x GM  Current Treatment Recommendations: Strengthening, Gait Training, Patient/Caregiver Education & Training, Equipment Evaluation, Education, & procurement, Balance Training, Functional Mobility Training, Endurance Training, Transfer Training, Safety Education & Training, Home Exercise Program    Patient

## 2021-01-19 NOTE — PROGRESS NOTES
[address] 59  INPATIENT OCCUPATIONAL THERAPY  [facility] CVICU 4B  EVALUATION    Time:   Time In: 1481  Time Out: 1520  Timed Code Treatment Minutes: 50 Minutes  Minutes: 65          Date: 2021  Patient Name: Bladimir Messer,   Gender: male      MRN: [id number]  : 1940  (79 y.o.)  Referring Practitioner: Dr. Antonio Torrez MD  Diagnosis: Acute Hypoxemic Respiratory Failure due to COVID-19  Additional Pertinent Hx: Pt presents to the Emergency Department for the evaluation of cough and shortness of breath. Patient reports gradual onset of symptoms over the past 5 days with significant worsening of the shortness of breath over the past 2 days. He reports unproductive cough associated with decreased sense of taste and smell and some nasal congestion over the past week. Denies any known sick contacts and has not been tested for Covid since symptom onset. He reports worsening of shortness of breath with exertion, improvement with rest.  Reports generalized weakness that occurs with activity. Restrictions/Precautions:  Restrictions/Precautions: General Precautions, Fall Risk, Isolation  Position Activity Restriction  Other position/activity restrictions: COVID+;  on BiPAP with settings 65%, PEEP 12;  on BiPAP and at 65%, PEEP 10. Subjective  Chart Reviewed: Yes, Orders, History and Physical, Other (comment)(PT evaluation)  Patient assessed for rehabilitation services?: Yes    Subjective: Pleasant and cooperative  Comments: RN approved session. Pt agreed to do his self care and also demonstrate some functional mobility. He had to remain within the length of the tubing for his BiPAP.     Pain:  Pain Assessment  Patient Currently in Pain: Denies    Social/Functional History:  Lives With: Spouse  Type of Home: House  Home Layout: One level  Home Access: Stairs to enter with rails  Entrance Stairs - Number of Steps: 3-4 steps with 1 rail  Home Equipment: (none)   Bathroom Shower/Tub: Walk-in Treatment and education initiated within context of evaluation. Evaluation time included review of current medical information, gathering information related to past medical, social and functional history, completion of standardized testing, formal and informal observation of tasks, assessment of data and development of plan of care and goals. Treatment time included skilled education and facilitation of tasks to increase safety and independence with ADL's for improved functional independence and quality of life. Pt discussed his present use of O2 to help him tolerate activity. He described his home routine and energy conservation techniques were reviewed with pt. A handout was provided. He indicated that he has not been delegating activities prior to admission. Encouraged pt to be flexible about how things get completed and to focus his energy on things which he enjoys doing himself and is able to handle: \"Picking your battles wisely\". Also encouraged pt to have a balance between rest and activity every day so that he maintains his strength. Pt verbalized understanding. Pt completed some leg and shoulder exercises while sitting at the edge of bed: long arc quads and shoulder circles  X 10 reps each. Pt tolerated fair. Pt needed cues to rest between sets. Exercises completed to increase pt's endurance for ease of doing self care and functional mobility. Pursed lip breathing technique was reviewed with pt.     Discharge Recommendations:  Continue to assess pending progress, Subacute/Skilled Nursing Facility    Patient Education:  OT Education: OT Role, Plan of Care, Energy Conservation  Patient Education: Importance of having a balance between activity and rest each day    Equipment Recommendations:  Equipment Needed: Yes  Other: Shower chair    Plan:  Times per week: 5x  Current Treatment Recommendations: Functional Mobility Training, Endurance Training, Self-Care / ADL, Patient/Caregiver Education & Training  Plan Comment: Pt would benefit from continued skilled OT services when medically stable and discharged from Acute. Recommend a stay on TCU prior to returning home. Specific instructions for Next Treatment: Functional mobility as able; ADLs and energy conservation techniques; UE exercises and proper breathing technique    Goals:  Patient goals : \"To get back to doing things without feeling so tired. \" pt states. Short term goals  Time Frame for Short term goals: By discharge  Short term goal 1: Pt will demonstrate simple standing ADLs while following pursed lip breathing with SBA maintain O2 saturation > than 90% for increased activity tolerance. Short term goal 2: Pt will complete a spongebath or other ADL while following energy conservation techniques with setup A and verbal cues if needed to increase his endurance for doing his morning ADLs. Short term goal 3: Pt will complete BUE ROM/moderate resistance exercises while following proper breathing technique to increase his strength and endurance for ease of doing light homemaking or shopping. Short term goal 4: Pt will further demonstrate functional mobility with OTR to prepare for doing self while out of bed. See long-term goal time frame for expected duration of plan of care. If no long-term goals established, a short length of stay is anticipated. Following session, patient left in safe position with all fall risk precautions in place.

## 2021-01-19 NOTE — CARE COORDINATION
DISASTER CHARTING    1/19/21, 10:40 AM EST    DISCHARGE ONGOING EVALUATION:     Dallin Marrow day: 2  Location: [facility]05/005-A Reason for admit: Acute hypoxemic respiratory [facility] [U07.1, J96.01]   Barriers to Discharge: Tmax 98.5, high flow oxygen at 93% FiO2, 60 liters oxygen with saturations at 93%. PT/OT, IV fluids, Vitamin C,D,zinc, IV antibiotics, Decadron, IV Remdesivir, electrolyte replacement protocols. PCP: Jailene De La Cruz MD  Readmission Risk Score: 13%  Patient Goals/Plan/Treatment Preferences: Spoke with Maria L Meléndez daughter. Susana Maldonado lives at home with his spouse. He is independent. Azeb Rey states that he will have support from her and her two siblings as well. Plan is to return home with spouse, family support. If home O2 needed preference is [facility]. We will follow for possible HH. Of note, Aleksandar's wife is also admitted with Covid.

## 2021-01-19 NOTE — FLOWSHEET NOTE
01/19/21 1553   Provider Notification   Reason for Communication Evaluate   Provider Name Dr. Cristin Mcleod   Provider Notification Physician   Method of Communication Secure Message   Response See orders   Notification Time [id number]: Notified Dr. Cristin Mcleod that patient was put back on bipap d/t maintaining sats in 80's on maxed settings of HFO RR has been 35-40. ABG ordered  1639: Updated Dr. Cristin Mcleod of ABG results, no further orders at this time.

## 2021-01-19 NOTE — PROGRESS NOTES
Patient's daughter, Peg, updated on plan of care and events of the night. All questions answered with no other questions at this time.

## 2021-01-19 NOTE — PROGRESS NOTES
Hospitalist Progress Note      Patient:  Tammi Cha    Unit/Bed:4B-05/005-A  YOB: 1940  MRN: [id number]   Acct: [de-identified]   PCP: Carito Diaz MD  Date of Admission: 1/17/2021    Assessment/Plan:    1. Acute hypoxic respiratory failure due to Covid-19 PNA: on HFO fiO2 95% at 60 lpm w/ SaO2 90%. On Dexamethasone (added PPI), Remdesivir, zinc, vitamin C, vitamin D, aspirin, LMWH BID dosing, s/p convalescent plasma; procal elevated; on Azirtho/ceftriaxone for possible superimposed bacterial PNA, prothrombotic levels elevated; CTPE negative; added ASA 81 QD. IS/Acapella/PT to see. Wean supplemental O2 as tolerated to SpO2 > 90%  1/19: improving slowly. Slowly weaning; CCM. Chest physio ordered  2. HypoNa: Na 124 on arrival, no prior to compare; likely hypovolemic d/t inadequate PO intake; pt received 1.0 L NS bolus in ED. Started on NS at 100 cc/hr; will trend serum Na to reassess response; urine lytes/Osm sent as well. 1/19: na 131. Decreased NS to 75 from 100 cc/hr. CCM. Will trend closely  3. Essential hypertension: continue home medications, fairly controlled; will monitor. 4. Hx of RCC: s/p R nephrectomy  5. Code status: Full code; palliative care to see  6. PT/OT to see    Chief Complaint: SOB    Initial H and P:-    Admitted for management of Acute hypoxic respiratory failure secondary to COVID 19      Subjective (past 24 hours):   Feeling improved. SOB improved. No new issues overnight. Denies CP/fever/chills/palpitation/ or GI symptoms      Past medical history, family history, social history and allergies reviewed again and is unchanged since admission. ROS (12 point review of systems completed. Pertinent positives noted.  Otherwise ROS is negative)     Medications:  Reviewed    Infusion Medications    sodium chloride 100 mL/hr at 01/19/21 3585    sodium chloride       Scheduled Medications    remdesivir IVPB  100 mg Intravenous Q24H    pantoprazole  40 mg Oral QAM AC    amLODIPine  5 mg Oral Daily    losartan  100 mg Oral Daily    sodium chloride flush  10 mL Intravenous 2 times per day    enoxaparin  40 mg Subcutaneous Q12H    zinc sulfate  50 mg Oral Daily    Vitamin D  1,000 Units Oral Daily    ascorbic acid  500 mg Oral Daily    aspirin  81 mg Oral Daily    cefTRIAXone (ROCEPHIN) IV  1 g Intravenous Q24H    azithromycin  500 mg Intravenous Q24H    dexamethasone  6 mg Oral Daily     PRN Meds: sodium chloride, sodium chloride flush, promethazine **OR** ondansetron, polyethylene glycol, acetaminophen **OR** acetaminophen, sodium chloride, potassium chloride **OR** potassium alternative oral replacement **OR** potassium chloride, magnesium sulfate      Intake/Output Summary (Last 24 hours) at 1/19/2021 0744  Last data filed at 1/19/2021 0358  Gross per 24 hour   Intake 3451.8 ml   Output 750 ml   Net 2701.8 ml       Diet:  DIET GENERAL;    Exam:  BP (!) 147/80   Pulse 65   Temp 97.4 °F (36.3 °C) (Axillary)   Resp (!) 34   Ht 5' 6\" (1.676 m)   Wt 180 lb (81.6 kg)   SpO2 95%   BMI 29.05 kg/m²   General appearance: No apparent distress, appears stated age and cooperative. HEENT: Pupils equal, round, and reactive to light. Conjunctivae/corneas clear. Neck: Supple, with full range of motion. No jugular venous distention. Trachea midline. Respiratory:  Diminished A/E at lung bases w/ scattered upper airway sounds. Cardiovascular: Regular rate and rhythm with normal S1/S2 without murmurs, rubs or gallops. Abdomen: Soft, non-tender, non-distended with normal bowel sounds. Musculoskeletal: passive and active ROM x 4 extremities. Skin: Skin color, texture, turgor normal.  No rashes or lesions. Neurologic:  Neurovascularly intact without any focal sensory/motor deficits.  Cranial nerves: II-XII intact, grossly non-focal.  Psychiatric: Alert and oriented, thought content appropriate, normal insight  Capillary Refill: Brisk,< 3 seconds   Peripheral Pulses: +2 palpable, equal bilaterally     Labs:   Recent Labs     01/17/21  1840 01/18/21  0626 01/19/21  0355   WBC 7.7 6.2 11.4*   HGB 13.2* 11.9* 12.3*   HCT 37.1* 34.7* 35.9*    201 271     Recent Labs     01/17/21  1840 01/18/21  0626 01/18/21  1255 01/18/21  1903 01/19/21  0026 01/19/21  0641   * 127* 124* 123* 123*  --    K 3.9 4.1  --   --   --   --    CL 90* 95*  --   --   --   --    CO2 19* 20*  --   --   --   --    BUN 16 14  --   --   --   --    CREATININE 0.7 0.6  --   --   --   --    CALCIUM 7.9* 7.5*  --   --   --  7.7*     Recent Labs     01/17/21  1840 01/18/21  0626   AST 43* 35   ALT 18 17   BILIDIR <0.2 <0.2   BILITOT 0.6 0.4   ALKPHOS 80 70     Recent Labs     01/17/21 1840   INR 1.12     No results for input(s): Melani Bhatia in the last 72 hours. Microbiology:    Blood culture #1:   Lab Results   Component Value Date    BC No growth-preliminary  01/17/2021       Blood culture #2:No results found for: Judye Kawasaki    Organism:  Lab Results   Component Value Date    ORG Enterobacter cloacae complex 04/03/2018    ORG Candida parapsilosis 04/03/2018    ORG Pseudomonas aeruginosa 04/03/2018         Lab Results   Component Value Date    LABGRAM  04/03/2018     Few segmented neutrophils observed. Rare epithelial cells observed. Rare budding yeast.         MRSA culture only:No results found for: [facility]    Urine culture: No results found for: LABURIN    Respiratory culture: No results found for: CULTRESP    Aerobic and Anaerobic :  Lab Results   Component Value Date    LABAERO  04/03/2018     No growth-preliminary  At least one isolate is resistant in vitro to current  regimen. LABAERO  04/03/2018     very light growth  Enterobacter species which may be initially susceptible to  third generation cephalosporins may develop resistance within  three to four days of initiation of this antimicrobial  therapy.       LABAERO light growth 04/03/2018    LABAERO very light growth 04/03/2018     Lab Results   Component Value Date    LABANAE  04/03/2018     No anaerobes isolated- preliminary  No anaerobes isolated         Urinalysis:    No results found for: Marian Nael, BACTERIA, RBCUA, BLOODU, SPECGRAV, GLUCOSEU    Radiology:  CTA CHEST W WO CONTRAST   Final Result   Impression:   1. No pulmonary emboli. 2.  No thoracic aortic aneurysm or dissection   3. Bilateral perihilar and peripheral airspace disease which may    represent pulmonary edema versus atypical viral pneumonia. 4.  Small bilateral pleural fluid collections   5. Multiple hepatic hypodensities are indeterminate and may represent    cysts, hemangiomas or metastatic disease. Nonemergent multiphase imaging    of the entire liver can be performed for further evaluation      This document has been electronically signed by: Shana Sanchez MD on    01/17/2021 09:04 PM      All CT scans at this facility use dose modulation, iterative    reconstruction, and/or weight-based   dosing when appropriate to reduce radiation dose to as low as reasonably    achievable. XR CHEST PORTABLE   Final Result   Impression:   1. Perihilar opacities may represent pulmonary edema versus atypical    pneumonia. This document has been electronically signed by: Shana Sanchez MD on    01/17/2021 09:03 PM           Cta Chest W Wo Contrast    Result Date: 1/17/2021  Exam:  CTA chest with IV contrast. Procedure: MIP reconstructions are performed Comparison: Chest x-ray 1/17/2021 Findings: No pulmonary emboli. No thoracic aortic aneurysm or dissection. No hilar or mediastinal adenopathy. No pericardial fluid collection. No pneumothorax or pneumomediastinum. Bilateral perihilar and peripheral airspace disease may represent pulmonary edema versus atypical viral pneumonia. Small bilateral pleural fluid collections. Small hiatal hernia.  Calcifications in the spleen and right hilar lymph nodes,

## 2021-01-19 NOTE — FLOWSHEET NOTE
Pt was asleep but he was anointed.  He was dealing with covid-19     01/19/21 1040   Encounter Summary   Services provided to: Patient   Referral/Consult From: [facility]ing   Place of [address] [place] [address] Visiting Yes  (1/19 P)   Complexity of Encounter Low   Length of Encounter 15 minutes   Routine   Type Initial   Assessment Sleeping   [facility]ram[facility]   Sacrament of Sick-Anointing Anointed

## 2021-01-20 ENCOUNTER — APPOINTMENT (OUTPATIENT)
Dept: GENERAL RADIOLOGY | Age: 81
DRG: 177 | End: 2021-01-20
Payer: MEDICARE

## 2021-01-20 LAB
ALBUMIN SERPL-MCNC: 3.1 G/DL (ref 3.5–5.1)
ALP BLD-CCNC: 89 U/L (ref 38–126)
ALT SERPL-CCNC: 16 U/L (ref 11–66)
ANION GAP SERPL CALCULATED.3IONS-SCNC: 14 MEQ/L (ref 8–16)
AST SERPL-CCNC: 31 U/L (ref 5–40)
BASOPHILS # BLD: 0.1 %
BASOPHILS ABSOLUTE: 0 THOU/MM3 (ref 0–0.1)
BILIRUB SERPL-MCNC: 0.5 MG/DL (ref 0.3–1.2)
BILIRUBIN DIRECT: < 0.2 MG/DL (ref 0–0.3)
BUN BLDV-MCNC: 18 MG/DL (ref 7–22)
CALCIUM SERPL-MCNC: 7.6 MG/DL (ref 8.5–10.5)
CHLORIDE BLD-SCNC: 101 MEQ/L (ref 98–111)
CO2: 19 MEQ/L (ref 23–33)
CREAT SERPL-MCNC: 0.5 MG/DL (ref 0.4–1.2)
EOSINOPHIL # BLD: 0 %
EOSINOPHILS ABSOLUTE: 0 THOU/MM3 (ref 0–0.4)
ERYTHROCYTE [DISTWIDTH] IN BLOOD BY AUTOMATED COUNT: 12.6 % (ref 11.5–14.5)
ERYTHROCYTE [DISTWIDTH] IN BLOOD BY AUTOMATED COUNT: 45.4 FL (ref 35–45)
GFR SERPL CREATININE-BSD FRML MDRD: > 90 ML/MIN/1.73M2
GLUCOSE BLD-MCNC: 125 MG/DL (ref 70–108)
HCT VFR BLD CALC: 38.3 % (ref 42–52)
HEMOGLOBIN: 13.3 GM/DL (ref 14–18)
IMMATURE GRANS (ABS): 0.11 THOU/MM3 (ref 0–0.07)
IMMATURE GRANULOCYTES: 0.8 %
LYMPHOCYTES # BLD: 4.3 %
LYMPHOCYTES ABSOLUTE: 0.6 THOU/MM3 (ref 1–4.8)
MCH RBC QN AUTO: 33.8 PG (ref 26–33)
MCHC RBC AUTO-ENTMCNC: 34.7 GM/DL (ref 32.2–35.5)
MCV RBC AUTO: 97.2 FL (ref 80–94)
MONOCYTES # BLD: 5 %
MONOCYTES ABSOLUTE: 0.7 THOU/MM3 (ref 0.4–1.3)
NUCLEATED RED BLOOD CELLS: 0 /100 WBC
PLATELET # BLD: 303 THOU/MM3 (ref 130–400)
PMV BLD AUTO: 8.9 FL (ref 9.4–12.4)
POTASSIUM SERPL-SCNC: 4.3 MEQ/L (ref 3.5–5.2)
RBC # BLD: 3.94 MILL/MM3 (ref 4.7–6.1)
SEG NEUTROPHILS: 89.8 %
SEGMENTED NEUTROPHILS ABSOLUTE COUNT: 12.4 THOU/MM3 (ref 1.8–7.7)
SODIUM BLD-SCNC: 127 MEQ/L (ref 135–145)
SODIUM BLD-SCNC: 131 MEQ/L (ref 135–145)
SODIUM BLD-SCNC: 134 MEQ/L (ref 135–145)
TOTAL PROTEIN: 5.4 G/DL (ref 6.1–8)
WBC # BLD: 13.8 THOU/MM3 (ref 4.8–10.8)

## 2021-01-20 PROCEDURE — 6370000000 HC RX 637 (ALT 250 FOR IP): Performed by: PHYSICIAN ASSISTANT

## 2021-01-20 PROCEDURE — 80053 COMPREHEN METABOLIC PANEL: CPT

## 2021-01-20 PROCEDURE — 2580000003 HC RX 258: Performed by: INTERNAL MEDICINE

## 2021-01-20 PROCEDURE — 6360000002 HC RX W HCPCS: Performed by: PHYSICIAN ASSISTANT

## 2021-01-20 PROCEDURE — 82248 BILIRUBIN DIRECT: CPT

## 2021-01-20 PROCEDURE — 2580000003 HC RX 258: Performed by: PHYSICIAN ASSISTANT

## 2021-01-20 PROCEDURE — 6370000000 HC RX 637 (ALT 250 FOR IP): Performed by: HOSPITALIST

## 2021-01-20 PROCEDURE — 94660 CPAP INITIATION&MGMT: CPT

## 2021-01-20 PROCEDURE — 97110 THERAPEUTIC EXERCISES: CPT

## 2021-01-20 PROCEDURE — 97530 THERAPEUTIC ACTIVITIES: CPT

## 2021-01-20 PROCEDURE — P9059 PLASMA, FRZ BETWEEN 8-24HOUR: HCPCS

## 2021-01-20 PROCEDURE — 2500000003 HC RX 250 WO HCPCS: Performed by: INTERNAL MEDICINE

## 2021-01-20 PROCEDURE — 99232 SBSQ HOSP IP/OBS MODERATE 35: CPT | Performed by: INTERNAL MEDICINE

## 2021-01-20 PROCEDURE — 2060000000 HC ICU INTERMEDIATE R&B

## 2021-01-20 PROCEDURE — 71045 X-RAY EXAM CHEST 1 VIEW: CPT

## 2021-01-20 PROCEDURE — 36430 TRANSFUSION BLD/BLD COMPNT: CPT

## 2021-01-20 PROCEDURE — 84295 ASSAY OF SERUM SODIUM: CPT

## 2021-01-20 PROCEDURE — 94761 N-INVAS EAR/PLS OXIMETRY MLT: CPT

## 2021-01-20 PROCEDURE — 85025 COMPLETE CBC W/AUTO DIFF WBC: CPT

## 2021-01-20 PROCEDURE — 36415 COLL VENOUS BLD VENIPUNCTURE: CPT

## 2021-01-20 PROCEDURE — 94669 MECHANICAL CHEST WALL OSCILL: CPT

## 2021-01-20 RX ORDER — SODIUM CHLORIDE 9 MG/ML
INJECTION, SOLUTION INTRAVENOUS PRN
Status: DISCONTINUED | OUTPATIENT
Start: 2021-01-20 | End: 2021-01-29 | Stop reason: HOSPADM

## 2021-01-20 RX ADMIN — DEXAMETHASONE 6 MG: 4 TABLET ORAL at 10:02

## 2021-01-20 RX ADMIN — PANTOPRAZOLE SODIUM 40 MG: 40 TABLET, DELAYED RELEASE ORAL at 05:35

## 2021-01-20 RX ADMIN — ASPIRIN 81 MG: 81 TABLET, CHEWABLE ORAL at 10:02

## 2021-01-20 RX ADMIN — ENOXAPARIN SODIUM 40 MG: 40 INJECTION SUBCUTANEOUS at 10:02

## 2021-01-20 RX ADMIN — SODIUM CHLORIDE, PRESERVATIVE FREE 10 ML: 5 INJECTION INTRAVENOUS at 19:42

## 2021-01-20 RX ADMIN — REMDESIVIR 100 MG: 100 INJECTION, POWDER, LYOPHILIZED, FOR SOLUTION INTRAVENOUS at 04:05

## 2021-01-20 RX ADMIN — CEFTRIAXONE SODIUM 1 G: 1 INJECTION, POWDER, FOR SOLUTION INTRAMUSCULAR; INTRAVENOUS at 19:41

## 2021-01-20 RX ADMIN — OXYCODONE HYDROCHLORIDE AND ACETAMINOPHEN 500 MG: 500 TABLET ORAL at 10:02

## 2021-01-20 RX ADMIN — LOSARTAN POTASSIUM 100 MG: 100 TABLET, FILM COATED ORAL at 10:02

## 2021-01-20 RX ADMIN — Medication 50 MG: at 10:01

## 2021-01-20 RX ADMIN — AMLODIPINE BESYLATE 5 MG: 5 TABLET ORAL at 10:02

## 2021-01-20 RX ADMIN — Medication 1000 UNITS: at 10:02

## 2021-01-20 ASSESSMENT — PAIN SCALES - GENERAL: PAINLEVEL_OUTOF10: 0

## 2021-01-20 NOTE — PLAN OF CARE
Problem: Impaired respiratory status  Goal: Able to breathe comfortably  Description: Able to breathe comfortably  Outcome: Ongoing   Vest therapy q8   Pt on bipap

## 2021-01-20 NOTE — PROGRESS NOTES
[address] [address]  [facility] CVICU 4B  Occupational Therapy  Daily Note  Time:    Time In: 1440  Time Out: 1506  Timed Code Treatment Minutes: 26 Minutes  Minutes: 26          Date: 2021  Patient Name: Beatriz Renteria,   Gender: male      Room: [facility]05/005-A  MRN: [id number]  : 1940  (79 y.o.)  Referring Practitioner: Dr. Elyssa Sanders MD  Diagnosis: Acute Hypoxemic Respiratory Failure due to COVID-19  Additional Pertinent Hx: Pt presents to the Emergency Department for the evaluation of cough and shortness of breath. Patient reports gradual onset of symptoms over the past 5 days with significant worsening of the shortness of breath over the past 2 days. He reports unproductive cough associated with decreased sense of taste and smell and some nasal congestion over the past week. Denies any known sick contacts and has not been tested for Covid since symptom onset. He reports worsening of shortness of breath with exertion, improvement with rest.  Reports generalized weakness that occurs with activity. Restrictions/Precautions:  Restrictions/Precautions: General Precautions, Fall Risk, Isolation  Position Activity Restriction  Other position/activity restrictions: COVID+;  on BiPAP with settings 65%, PEEP 12;  on BiPAP and at 65%, PEEP 10. high flow       SUBJECTIVE: cooperative with encouragement, looking overwhelmed at times    PAIN: 0/10: no c/o pain    COGNITION: Slow Processing    ADL:   No ADL's completed this session. Completed prior to session. BALANCE:  Sitting Balance:  Independent. Standing Balance: Contact Guard Assistance. BED MOBILITY:  Supine to Sit: Moderate Assistance    Sit to Supine: Moderate Assistance      TRANSFERS:  Sit to Stand:  Contact Guard Assistance.       ADDITIONAL ACTIVITIES:  Completed B scapula elevation & retraction (in standing), shoulder horizontal add/abduction (while seated)x 10 reps    ASSESSMENT:     Activity Tolerance:  Patient tolerance

## 2021-01-20 NOTE — PROGRESS NOTES
[address] [address]  INPATIENT PHYSICAL THERAPY  DAILY NOTE  [facility] CVICU 4B - 4B-05/005-A     Time In: 7928  Time Out: 1206  Timed Code Treatment Minutes: 23 Minutes  Minutes: 23          Date: 2021  Patient Name: Rosa Pak,  Gender:  male        MRN: [id number]  : 1940  (79 y.o.)     Referring Practitioner: Gina Seals MD  Diagnosis: Acute hypoxemic respiratory failure due to COVID-19  Additional Pertinent Hx: Per ED note, pt \"is a 79 y.o. male who presents to the Emergency Department for the evaluation of cough and shortness of breath. Patient reports gradual onset of symptoms over the past 5 days with significant worsening of the shortness of breath over the past 2 days. He reports unproductive cough associated with decreased sense of taste and smell and some nasal congestion over the past week. Denies any known sick contacts and has not been tested for Covid since symptom onset. He reports worsening of shortness of breath with exertion, improvement with rest.  Reports generalized weakness that occurs with activity. He has been taking Tylenol and was also started on vitamin C, vitamin D, zinc by his daughter earlier in the season. He denies any pulmonary history. He is not a smoker. He denies any associated fevers, chills, generalized body aches, headache, sore throat, vomiting, diarrhea, wheezing, edema, orthopnea or dizziness. \"     Prior Level of Function:  Lives With: Spouse  Type of Home: House  Home Layout: One level  Home Access: Stairs to enter with rails  Entrance Stairs - Number of Steps: 3-4 steps with 1 rail  Home Equipment: (none)   Bathroom Shower/Tub: Walk-in shower  Bathroom Toilet: Standard  Bathroom Equipment: Grab bars in shower  Bathroom Accessibility: Accessible    Receives Help From: Family  ADL Assistance: [address] [place] [address] Avenue: Independent  Homemaking Responsibilities: Yes  Ambulation Assistance: Independent  Transfer Assistance: Independent  Active : Yes  Additional Comments: Pt reports being Independent with all mobility in PLOF. Pt shared homemaking with his spouse prior to admission. Pt's spouse is also hospitalized with COVID-19 virus at this time. Restrictions/Precautions:  Restrictions/Precautions: General Precautions, Fall Risk, Isolation  Position Activity Restriction  Other position/activity restrictions: COVID+; 1/18 on BiPAP with settings 65%, PEEP 12; 1/19 on BiPAP and at 65%, PEEP 10. Pt varying between BiPAP and HFNC per nursing. SUBJECTIVE: Pt on BiPAP and having tendency to have fast Resp Rate into the 30's. Pt was educated to ease the speed of his breathing to focus on deeper breaths to help with this. PAIN: denies      OBJECTIVE:  Bed Mobility:  Supine to Sit: Contact Guard Assistance  Sit to Supine: Stand By Assistance, with increased time for completion     Transfers:  Sit to Stand: Contact Guard Assistance  Stand to Sit:Contact Guard Assistance    Ambulation:  Not tested      Balance:  grossly steady with sitting and light CGA with standing    Exercise:  Patient was guided in 1 set(s) 10-12 reps of exercise to both lower extremities. Ankle pumps, Glut sets, Quad sets, Heelslides, Short arc quads, Hip abduction/adduction, Seated marches and Long arc quads. Exercises were completed for increased independence with functional mobility. Functional Outcome Measures: Not completed       ASSESSMENT:  Assessment: Patient progressing toward established goals. and participated well with exercises has limitations due to respiratory needs  Activity Tolerance:  Patient tolerance of  treatment: good. Minus due to Respiratory needs     Equipment Recommendations: Other: monitor for needs - poss RW at discharge?   Discharge Recommendations:  TCU/SNF Continue to assess pending progress    Plan: Times per week: 4-5x GM  Current Treatment Recommendations: Strengthening, Gait Training, Patient/Caregiver Education & Training, Equipment Evaluation, Education, & procurement, Balance Training, Functional Mobility Training, Endurance Training, Transfer Training, Safety Education & Training, Home Exercise Program    Patient Education  Patient Education: Plan of Care, Home Exercise Program    Goals:  Patient goals : go home  Short term goals  Time Frame for Short term goals: at discharge  Short term goal 1: Pt to be Mod I for supine <> sit to get in/out of bed  Short term goal 2: Pt to be Mod I for sit <> stand to get up to ambulate  Short term goal 3: Pt to ambulate > 60 ft with/without AD with Supervision for household distances  Long term goals  Time Frame for Long term goals : not set due to short ELOS    Following session, patient left in safe position with all fall risk precautions in place. Werner Nguyen.  Rosalba Cardona, [facility] 8

## 2021-01-20 NOTE — PROGRESS NOTES
Hospitalist Progress Note      Patient:  Chely Cohen    Unit/Bed:4B-05/005-A  YOB: 1940  MRN: [id number]   Acct: [de-identified]   PCP: Amador Song MD  Date of Admission: 1/17/2021    Assessment/Plan:    1. Acute hypoxic respiratory failure due to Covid-19 PNA: on HFO fiO2 95% at 60 lpm w/ SaO2 90%. On Dexamethasone (added PPI), Remdesivir, zinc, vitamin C, vitamin D, aspirin, LMWH BID dosing, s/p convalescent plasma; procal elevated; on Azirtho/ceftriaxone for possible superimposed bacterial PNA, prothrombotic levels elevated; CTPE negative; added ASA 81 QD. IS/Acapella/PT to see. Wean supplemental O2 as tolerated to SpO2 > 90%  1/19: improving slowly. Slowly weaning; CCM. Chest physio ordered  1/20: pt still on HFNC, cont current management. Will give Plasma today, cont Decadron, Remdisivr. 2. HypoNa: Na 124 on arrival, no prior to compare; likely hypovolemic d/t inadequate PO intake; pt received 1.0 L NS bolus in ED. Started on NS at 100 cc/hr; will trend serum Na to reassess response; urine lytes/Osm sent as well. 1/19: na 131. Decreased NS to 75 from 100 cc/hr. CCM. Will trend closely  3. Essential hypertension: continue home medications, fairly controlled; will monitor. 4. Hx of RCC: s/p R nephrectomy  5. Code status: Full code; palliative care to see  6. PT/OT to see    Chief Complaint: SOB    Initial H and P:-    Admitted for management of Acute hypoxic respiratory failure secondary to COVID 19      Subjective (past 24 hours): No new issues overnight. Denies CP/fever/chills/palpitation/ or GI symptoms. Tolerating PO intake. Past medical history, family history, social history and allergies reviewed again and is unchanged since admission. ROS (12 point review of systems completed. Pertinent positives noted.  Otherwise ROS is negative)     Medications:  Reviewed    Infusion Medications    sodium chloride      sodium Alert and oriented, thought content appropriate, normal insight  Capillary Refill: Brisk,< 3 seconds   Peripheral Pulses: +2 palpable, equal bilaterally     Labs:   Recent Labs     01/18/21  0626 01/19/21  0355 01/20/21  0721   WBC 6.2 11.4* 13.8*   HGB 11.9* 12.3* 13.3*   HCT 34.7* 35.9* 38.3*    271 303     Recent Labs     01/18/21  0626 01/18/21  0626 01/19/21  0641 01/19/21  0641 01/20/21  0136 01/20/21  0721 01/20/21  1316   *   < > 131*   < > 127* 134* 131*   K 4.1  --  4.7  --   --  4.3  --    CL 95*  --  99  --   --  101  --    CO2 20*  --  22*  --   --  19*  --    BUN 14  --  16  --   --  18  --    CREATININE 0.6  --  0.5  --   --  0.5  --    CALCIUM 7.5*  --  7.7*  --   --  7.6*  --     < > = values in this interval not displayed. Recent Labs     01/18/21  0626 01/19/21  0641 01/20/21  0721   AST 35 35 31   ALT 17 17 16   BILIDIR <0.2 <0.2 <0.2   BILITOT 0.4 0.4 0.5   ALKPHOS 70 82 89     Recent Labs     01/17/21  1840   INR 1.12     No results for input(s): [place] in the last 72 hours. Microbiology:    Blood culture #1:   Lab Results   Component Value Date    BC No growth-preliminary  01/17/2021       Blood culture #2:No results found for: Karel Mouse    Organism:  Lab Results   Component Value Date    ORG Enterobacter cloacae complex 04/03/2018    ORG Candida parapsilosis 04/03/2018    ORG Pseudomonas aeruginosa 04/03/2018         Lab Results   Component Value Date    LABGRAM  04/03/2018     Few segmented neutrophils observed. Rare epithelial cells observed. Rare budding yeast.         MRSA culture only:No results found for: [facility]    Urine culture: No results found for: LABURIN    Respiratory culture: No results found for: CULTRESP    Aerobic and Anaerobic :  Lab Results   Component Value Date    LABAERO  04/03/2018     No growth-preliminary  At least one isolate is resistant in vitro to current  regimen.       LABAERO  04/03/2018     very light growth  Enterobacter species which may be initially susceptible to  third generation cephalosporins may develop resistance within  three to four days of initiation of this antimicrobial  therapy. LABAERO light growth 04/03/2018    LABAERO very light growth 04/03/2018     Lab Results   Component Value Date    LABANAE  04/03/2018     No anaerobes isolated- preliminary  No anaerobes isolated         Urinalysis:    No results found for: Nevfelipe Fritz, BACTERIA, RBCUA, BLOODU, SPECGRAV, GLUCOSEU    Radiology:  XR CHEST 1 VIEW   Final Result   Worsening airspace infiltrates worsening appearance of the chest.            **This report has been created using voice recognition software. It may contain minor errors which are inherent in voice recognition technology. **      Final report electronically signed by Dr. Tab Montana on 1/20/2021 10:21 AM      CTA CHEST W WO CONTRAST   Final Result   Impression:   1. No pulmonary emboli. 2.  No thoracic aortic aneurysm or dissection   3. Bilateral perihilar and peripheral airspace disease which may    represent pulmonary edema versus atypical viral pneumonia. 4.  Small bilateral pleural fluid collections   5. Multiple hepatic hypodensities are indeterminate and may represent    cysts, hemangiomas or metastatic disease. Nonemergent multiphase imaging    of the entire liver can be performed for further evaluation      This document has been electronically signed by: Gloria Crane MD on    01/17/2021 09:04 PM      All CT scans at this facility use dose modulation, iterative    reconstruction, and/or weight-based   dosing when appropriate to reduce radiation dose to as low as reasonably    achievable. XR CHEST PORTABLE   Final Result   Impression:   1. Perihilar opacities may represent pulmonary edema versus atypical    pneumonia.       This document has been electronically signed by: Gloria Crane MD on    01/17/2021 09:03 PM           Cta Chest W Wo Contrast    Result Date: 01/17/2021 09:03 PM     With RN in room, patient was updated about and agreed upon the treatment plan, all the questions and concerns were addressed. Patient was seen in PPE (PERSONAL PROTECTIVE EQUIPMENT). Patient was interviewed in Strict Airborne and Droplet Precautions.     Electronically signed by Jorge Callahan MD on 1/20/2021 at 2:10 PM

## 2021-01-20 NOTE — CARE COORDINATION
DISASTER CHARTING    1/20/21, 11:36 AM EST    DISCHARGE ONGOING EVALUATION:     Felipe Acuña day: 3  Location: [facility]05/005- Reason for admit: Acute hypoxemic respiratory failure due to COVID-19 ([facility] [U07.1, J96.01]   Barriers to Discharge: Tmax 98, high low oxygen 93% FiO2, 60 liters with saturations at 88%. PT/OT, convalescent plasma transfusion, Vitamin C,D,zinc, IV antibiotics, electrolyte replacement protocols, IV Remdesivir. PCP: Jerardo Ham MD  Readmission Risk Score: 14%  Patient Goals/Plan/Treatment Preferences: Plan is to return home with spouse and family support. Of note wife is still hospitalized as well with Covid.  Will follow for potential needs

## 2021-01-21 LAB
ALBUMIN SERPL-MCNC: 2.4 G/DL (ref 3.5–5.1)
ALP BLD-CCNC: 79 U/L (ref 38–126)
ALT SERPL-CCNC: 16 U/L (ref 11–66)
ANION GAP SERPL CALCULATED.3IONS-SCNC: 10 MEQ/L (ref 8–16)
AST SERPL-CCNC: 29 U/L (ref 5–40)
BASOPHILS # BLD: 0.1 %
BASOPHILS ABSOLUTE: 0 THOU/MM3 (ref 0–0.1)
BILIRUB SERPL-MCNC: 0.5 MG/DL (ref 0.3–1.2)
BILIRUBIN DIRECT: < 0.2 MG/DL (ref 0–0.3)
BUN BLDV-MCNC: 15 MG/DL (ref 7–22)
CALCIUM SERPL-MCNC: 7.4 MG/DL (ref 8.5–10.5)
CHLORIDE BLD-SCNC: 98 MEQ/L (ref 98–111)
CO2: 22 MEQ/L (ref 23–33)
CREAT SERPL-MCNC: 0.5 MG/DL (ref 0.4–1.2)
EOSINOPHIL # BLD: 0 %
EOSINOPHILS ABSOLUTE: 0 THOU/MM3 (ref 0–0.4)
ERYTHROCYTE [DISTWIDTH] IN BLOOD BY AUTOMATED COUNT: 12.7 % (ref 11.5–14.5)
ERYTHROCYTE [DISTWIDTH] IN BLOOD BY AUTOMATED COUNT: 44.8 FL (ref 35–45)
GFR SERPL CREATININE-BSD FRML MDRD: > 90 ML/MIN/1.73M2
GLUCOSE BLD-MCNC: 151 MG/DL (ref 70–108)
HCT VFR BLD CALC: 33.9 % (ref 42–52)
HEMOGLOBIN: 11.9 GM/DL (ref 14–18)
IMMATURE GRANS (ABS): 0.1 THOU/MM3 (ref 0–0.07)
IMMATURE GRANULOCYTES: 0.8 %
LYMPHOCYTES # BLD: 4.1 %
LYMPHOCYTES ABSOLUTE: 0.5 THOU/MM3 (ref 1–4.8)
MCH RBC QN AUTO: 33.5 PG (ref 26–33)
MCHC RBC AUTO-ENTMCNC: 35.1 GM/DL (ref 32.2–35.5)
MCV RBC AUTO: 95.5 FL (ref 80–94)
MONOCYTES # BLD: 4.7 %
MONOCYTES ABSOLUTE: 0.6 THOU/MM3 (ref 0.4–1.3)
NUCLEATED RED BLOOD CELLS: 0 /100 WBC
PLATELET # BLD: 296 THOU/MM3 (ref 130–400)
PMV BLD AUTO: 9 FL (ref 9.4–12.4)
POTASSIUM SERPL-SCNC: 3.9 MEQ/L (ref 3.5–5.2)
RBC # BLD: 3.55 MILL/MM3 (ref 4.7–6.1)
SEG NEUTROPHILS: 90.3 %
SEGMENTED NEUTROPHILS ABSOLUTE COUNT: 11.6 THOU/MM3 (ref 1.8–7.7)
SODIUM BLD-SCNC: 130 MEQ/L (ref 135–145)
TOTAL PROTEIN: 5.3 G/DL (ref 6.1–8)
WBC # BLD: 12.8 THOU/MM3 (ref 4.8–10.8)

## 2021-01-21 PROCEDURE — 97116 GAIT TRAINING THERAPY: CPT

## 2021-01-21 PROCEDURE — 80053 COMPREHEN METABOLIC PANEL: CPT

## 2021-01-21 PROCEDURE — 6370000000 HC RX 637 (ALT 250 FOR IP): Performed by: PHYSICIAN ASSISTANT

## 2021-01-21 PROCEDURE — 99233 SBSQ HOSP IP/OBS HIGH 50: CPT | Performed by: INTERNAL MEDICINE

## 2021-01-21 PROCEDURE — 97110 THERAPEUTIC EXERCISES: CPT

## 2021-01-21 PROCEDURE — 2580000003 HC RX 258: Performed by: INTERNAL MEDICINE

## 2021-01-21 PROCEDURE — 97530 THERAPEUTIC ACTIVITIES: CPT

## 2021-01-21 PROCEDURE — 94761 N-INVAS EAR/PLS OXIMETRY MLT: CPT

## 2021-01-21 PROCEDURE — 6360000002 HC RX W HCPCS: Performed by: PHYSICIAN ASSISTANT

## 2021-01-21 PROCEDURE — 2060000000 HC ICU INTERMEDIATE R&B

## 2021-01-21 PROCEDURE — 94669 MECHANICAL CHEST WALL OSCILL: CPT

## 2021-01-21 PROCEDURE — 82248 BILIRUBIN DIRECT: CPT

## 2021-01-21 PROCEDURE — 36415 COLL VENOUS BLD VENIPUNCTURE: CPT

## 2021-01-21 PROCEDURE — 2580000003 HC RX 258: Performed by: PHYSICIAN ASSISTANT

## 2021-01-21 PROCEDURE — 94660 CPAP INITIATION&MGMT: CPT

## 2021-01-21 PROCEDURE — 85025 COMPLETE CBC W/AUTO DIFF WBC: CPT

## 2021-01-21 PROCEDURE — 6360000002 HC RX W HCPCS: Performed by: INTERNAL MEDICINE

## 2021-01-21 PROCEDURE — 2500000003 HC RX 250 WO HCPCS: Performed by: INTERNAL MEDICINE

## 2021-01-21 RX ADMIN — ASPIRIN 81 MG: 81 TABLET, CHEWABLE ORAL at 07:33

## 2021-01-21 RX ADMIN — OXYCODONE HYDROCHLORIDE AND ACETAMINOPHEN 500 MG: 500 TABLET ORAL at 07:35

## 2021-01-21 RX ADMIN — LOSARTAN POTASSIUM 100 MG: 100 TABLET, FILM COATED ORAL at 07:35

## 2021-01-21 RX ADMIN — REMDESIVIR 100 MG: 100 INJECTION, POWDER, LYOPHILIZED, FOR SOLUTION INTRAVENOUS at 04:18

## 2021-01-21 RX ADMIN — DEXAMETHASONE 6 MG: 4 TABLET ORAL at 07:33

## 2021-01-21 RX ADMIN — SODIUM CHLORIDE, PRESERVATIVE FREE 10 ML: 5 INJECTION INTRAVENOUS at 20:12

## 2021-01-21 RX ADMIN — AZITHROMYCIN DIHYDRATE 500 MG: 500 INJECTION, POWDER, LYOPHILIZED, FOR SOLUTION INTRAVENOUS at 23:46

## 2021-01-21 RX ADMIN — SODIUM CHLORIDE, PRESERVATIVE FREE 10 ML: 5 INJECTION INTRAVENOUS at 23:46

## 2021-01-21 RX ADMIN — AMLODIPINE BESYLATE 5 MG: 5 TABLET ORAL at 07:35

## 2021-01-21 RX ADMIN — Medication 50 MG: at 07:35

## 2021-01-21 RX ADMIN — ENOXAPARIN SODIUM 40 MG: 40 INJECTION SUBCUTANEOUS at 15:10

## 2021-01-21 RX ADMIN — SODIUM CHLORIDE, PRESERVATIVE FREE 10 ML: 5 INJECTION INTRAVENOUS at 07:36

## 2021-01-21 RX ADMIN — CEFTRIAXONE SODIUM 1 G: 1 INJECTION, POWDER, FOR SOLUTION INTRAMUSCULAR; INTRAVENOUS at 20:35

## 2021-01-21 RX ADMIN — ACETAMINOPHEN 650 MG: 325 TABLET ORAL at 21:39

## 2021-01-21 RX ADMIN — AZITHROMYCIN DIHYDRATE 500 MG: 500 INJECTION, POWDER, LYOPHILIZED, FOR SOLUTION INTRAVENOUS at 00:13

## 2021-01-21 RX ADMIN — Medication 1000 UNITS: at 07:36

## 2021-01-21 RX ADMIN — ENOXAPARIN SODIUM 40 MG: 40 INJECTION SUBCUTANEOUS at 00:13

## 2021-01-21 ASSESSMENT — PAIN SCALES - GENERAL
PAINLEVEL_OUTOF10: 0
PAINLEVEL_OUTOF10: 0
PAINLEVEL_OUTOF10: 3

## 2021-01-21 NOTE — PROGRESS NOTES
[address]  Occupational Therapy  Daily Note  Time:   Time In: 4315  Time Out: 1024  Timed Code Treatment Minutes: 30 Minutes  Minutes: 30          Date: 2021  Patient Name: Rosa Pak,   Gender: male      Room: -05/005-A  MRN: [id number]  : 1940  (79 y.o.)  Referring Practitioner: Dr. Vikram Sotelo MD  Diagnosis: Acute Hypoxemic Respiratory Failure due to COVID-19  Additional Pertinent Hx: Pt presents to the Emergency Department for the evaluation of cough and shortness of breath. Patient reports gradual onset of symptoms over the past 5 days with significant worsening of the shortness of breath over the past 2 days. He reports unproductive cough associated with decreased sense of taste and smell and some nasal congestion over the past week. Denies any known sick contacts and has not been tested for Covid since symptom onset. He reports worsening of shortness of breath with exertion, improvement with rest.  Reports generalized weakness that occurs with activity. Restrictions/Precautions:  Restrictions/Precautions: General Precautions, Fall Risk, Isolation  Position Activity Restriction  Other position/activity restrictions: COVID+;  on BiPAP with settings 65%, PEEP 12;  on BiPAP and at 65%, PEEP 10. high flow      SUBJECTIVE: Pt seated in upright position in bed upon arrival, agreeablt to OT session. Pt states he feels \"worn out\" this morning. Patient on 60 Lpm 93% O2 via High Flow  upon arrival to room. Patient O2 sats at 97 %. Upon activity patient maintaining O2 at >95% %. Pt requiring mod rest break(s) with activity. RR- High 30's upon arrival- lengthy education for efficient deep breathing and able to lower to 13 prior to there ex. 20's-low 30's with exertion- fair carryover for proper breathing technique. HR- 80's  BP- 174/84    PAIN: Denies    COGNITION: WFL    ADL:   Toileting: with set-up.   For urinal use and hygiene completed seated upright in bed. BALANCE:  Not Tested this session d/t fatigue. BED MOBILITY:  Rolling to Left: Stand By Assistance    Rolling to Right: Stand By Assistance      TRANSFERS:  Not Tested this session d/t fatigue after HEP completion. FUNCTIONAL MOBILITY:  OTR to further assess. ADDITIONAL ACTIVITIES:  Patient identified a personal goal to increase UB strength and improve overall endurance so they can complete their toilet & shower transfers; skilled edu on UE strengthening. Completed BUE side lying HEP for AAROM shoulder flexion rail slides and horizontal abduction to promote thoracic opening for deep breathing- pt required min rest break between each exercise and min v/c for proper technique, good tolerance. All completed to increase strength and endurance required for ADLs. ASSESSMENT:     Activity Tolerance:  Patient tolerance of  treatment: good. Discharge Recommendations: Continue to assess pending progress, Subacute/Skilled Nursing Facility   Equipment Recommendations: Equipment Needed: Yes  Other: Shower chair  Plan: Times per week: 5x  Specific instructions for Next Treatment: Functional mobility as able; ADLs and energy conservation techniques; UE exercises and proper breathing technique  Current Treatment Recommendations: Functional Mobility Training, Endurance Training, Self-Care / ADL, Patient/Caregiver Education & Training  Plan Comment: Pt would benefit from continued skilled OT services when medically stable and discharged from Acute. Recommend a stay on TCU prior to returning home. Patient Education  Patient Education: ADL's, Energy Conservation, Home Exercise Program and Importance of Increasing Activity    Goals  Short term goals  Time Frame for Short term goals: By discharge  Short term goal 1: Pt will demonstrate simple standing ADLs while following pursed lip breathing with SBA maintain O2 saturation > than 90% for increased activity tolerance.   Short term goal 2: Pt will complete a spongebath or other ADL while following energy conservation techniques with setup A and verbal cues if needed to increase his endurance for doing his morning ADLs. Short term goal 3: Pt will complete BUE ROM/moderate resistance exercises while following proper breathing technique to increase his strength and endurance for ease of doing light homemaking or shopping. Short term goal 4: Pt will further demonstrate functional mobility with OTR to prepare for doing self while out of bed.     Following session, patient left in safe position with all fall risk precautions in place

## 2021-01-21 NOTE — PROGRESS NOTES
[facility]  INPATIENT PHYSICAL THERAPY  DAILY NOTE  [facility] CVICU 4B - 4B-05/005-A     Time In: 0426  Time Out: 1228  Timed Code Treatment Minutes: 26 Minutes  Minutes: 26          Date: 2021  Patient Name: Nadege Ma,  Gender:  male        MRN: [id number]  : 1940  (79 y.o.)     Referring Practitioner: Jacklyn Joseph MD  Diagnosis: Acute hypoxemic respiratory failure due to COVID-19  Additional Pertinent Hx: Per ED note, pt \"is a 79 y.o. male who presents to the Emergency Department for the evaluation of cough and shortness of breath. Patient reports gradual onset of symptoms over the past 5 days with significant worsening of the shortness of breath over the past 2 days. He reports unproductive cough associated with decreased sense of taste and smell and some nasal congestion over the past week. Denies any known sick contacts and has not been tested for Covid since symptom onset. He reports worsening of shortness of breath with exertion, improvement with rest.  Reports generalized weakness that occurs with activity. He has been taking Tylenol and was also started on vitamin C, vitamin D, zinc by his daughter earlier in the season. He denies any pulmonary history. He is not a smoker. He denies any associated fevers, chills, generalized body aches, headache, sore throat, vomiting, diarrhea, wheezing, edema, orthopnea or dizziness. \"     Prior Level of Function:  Lives With: Spouse  Type of Home: House  Home Layout: One level  Home Access: Stairs to enter with rails  Entrance Stairs - Number of Steps: 3-4 steps with 1 rail  Home Equipment: (none)   Bathroom Shower/Tub: Walk-in shower  Bathroom Toilet: Standard  Bathroom Equipment: Grab bars in shower  Bathroom Accessibility: Accessible    Receives Help From: Family  ADL Assistance: [address]: Independent  Homemaking Responsibilities: Yes  Ambulation Assistance: Independent  Transfer Assistance: Independent  Active : Yes  Additional Comments: Pt reports being Independent with all mobility in PLOF. Pt shared homemaking with his spouse prior to admission. Pt's spouse is also hospitalized with COVID-19 virus at this time. Restrictions/Precautions:  Restrictions/Precautions: General Precautions, Fall Risk, Isolation  Position Activity Restriction  Other position/activity restrictions: COVID+; 1/18 on BiPAP with settings 65%, PEEP 12; 1/19 on BiPAP and at 65%, PEEP 10. high flow 1/20      SUBJECTIVE: Pt resting in bed and agrees to therapy. PAIN: denies      OBJECTIVE:  Bed Mobility:  Supine to Sit: Stand By Assistance, with head of bed raised, with increased time for completion  Sit to Supine: Minimal Assistance, with increased time for completion     Transfers:  Sit to Stand: Contact Guard Assistance  Stand to [employer] Assistance    Ambulation:  Contact Guard Assistance, Minimal Assistance  Distance: 36 ft (12 ft x 3 due to short tubing with HFNC)  Surface: Level Tile  Device:Hand-Held Assist  Gait Deviations:  Decreased Step Length Bilaterally, Decreased Gait Speed, Decreased Heel Strike Bilaterally, Narrow Base of Support and Unsteady Gait    Balance:  sitting with Supervision and CGA for standing activity    Exercise:  Patient was guided in 1 set(s) 10 reps of exercise to both lower extremities. Ankle pumps, Glut sets, Quad sets, Hip abduction/adduction, Seated marches and Long arc quads. Exercises were completed for increased independence with functional mobility. Functional Outcome Measures: Completed  AM-PAC Inpatient Mobility Raw Score : 18  AM-PAC Inpatient T-Scale Score : 43.63    ASSESSMENT:  Assessment: Patient progressing toward established goals. and continues to show appropriate resp rate with exercises and then returns to faster rate when resting. Activity Tolerance:  Patient tolerance of  treatment: good. O2 sats >87% throughout while on HFNC.      Equipment Recommendations: Other: monitor for needs - poss RW at discharge? Discharge Recommendations:  TCU/SNF Continue to assess pending progress    Plan: Times per week: 4-5x GM  Current Treatment Recommendations: Strengthening, Gait Training, Patient/Caregiver Education & Training, Equipment Evaluation, Education, & procurement, Balance Training, Functional Mobility Training, Endurance Training, Transfer Training, Safety Education & Training, Home Exercise Program    Patient Education  Patient Education: Plan of Care, Home Exercise Program    Goals:  Patient goals : go home  Short term goals  Time Frame for Short term goals: at discharge  Short term goal 1: Pt to be Mod I for supine <> sit to get in/out of bed  Short term goal 2: Pt to be Mod I for sit <> stand to get up to ambulate  Short term goal 3: Pt to ambulate > 60 ft with/without AD with Supervision for household distances  Long term goals  Time Frame for Long term goals : not set due to short ELOS    Following session, patient left in safe position with all fall risk precautions in place. Ginger Crockett.  Gia Zhang [place] [address]

## 2021-01-21 NOTE — CARE COORDINATION
DISASTER CHARTING    1/21/21, 10:42 AM EST    DISCHARGE ONGOING EVALUATION:     Dylon Kearney day: 4  Location: [facility]05/005-A Reason for admit: Acute hypoxemic respiratory failure due to COVID-19 ([facility] [address] [U07.1, J96.01]   Barriers to Discharge: Tmax 97.8, high flow oxygen at 90% FiO2, 60 liters oxygen with saturations at 90%. PT/OT, incentive spirometry, Vitamin C,D,zinc, IV antibiotics, Decadron, electrolyte replacement protocol, IV Remdesivir. PCP: Colin Wolf MD  Readmission Risk Score: 14%  Patient Goals/Plan/Treatment Preferences: from home with spouse. Plan is to return home with family support. If home O2 needed pt preference is [facility]. Will follow.

## 2021-01-21 NOTE — PROGRESS NOTES
Hospitalist Progress Note      Patient:  Fatemeh Cedillo    Unit/Bed:4B-05/005-A  YOB: 1940  MRN: [id number]   Acct: [de-identified]   PCP: Stewart Coleman MD  Date of Admission: 1/17/2021    Assessment/Plan:    1. Acute hypoxic respiratory failure due to Covid-19 PNA: on HFO fiO2 95% at 60 lpm w/ SaO2 90%. On Dexamethasone (added PPI), Remdesivir, zinc, vitamin C, vitamin D, aspirin, LMWH BID dosing, s/p convalescent plasma; procal elevated; on Azirtho/ceftriaxone for possible superimposed bacterial PNA, prothrombotic levels elevated; CTPE negative; added ASA 81 QD. IS/Acapella/PT to see. Wean supplemental O2 as tolerated to SpO2 > 90%    1/19: improving slowly. Slowly weaning; CCM. Chest physio ordered  1/20: pt still on HFNC, cont current management. Will give Plasma today (2U), cont Decadron (Day 4), Remdisivr (Day 3), Vitamin C, D, Zinc, ASA 81, Lovenox BID, Acapella and IS.   1/21: went from BiPAP to HFNC today. Will cont current management. Will give Plasma today (2U), cont Decadron (Day 5), Remdisivr (Day 4), IV ABx (Day 5), Vitamin C, D, Zinc, ASA 81, Lovenox BID, Acapella and IS. 2. Hyponatremia: Na 124 on arrival, no prior to compare; likely hypovolemic d/t inadequate PO intake; pt received 1.0 L NS bolus in ED. Started on NS at 100 cc/hr; will trend serum Na to reassess response; urine lytes/Osm sent as well.     1/21: Na down to 130 today. NS Infusion stopped yesterday. Will continue to trend Na levels and if declines again will consult Nephrology. 3. Essential hypertension: continue home medications, fairly controlled; will monitor. 4. Hx of RCC: s/p R nephrectomy  5. Code status: Full code; palliative care to see  6. PT/OT to see    Chief Complaint: SOB    Initial H and P:-    Admitted for management of Acute hypoxic respiratory failure secondary to COVID 19      Subjective (past 24 hours): No new issues overnight.  Denies CP/fever/chills/palpitation/ or GI symptoms. Tolerating PO intake. On HFNC. Pt still having cough and sob. No diarrhea. Past medical history, family history, social history and allergies reviewed again and is unchanged since admission. ROS (12 point review of systems completed. Pertinent positives noted. Otherwise ROS is negative)     Medications:  Reviewed    Infusion Medications    sodium chloride      sodium chloride       Scheduled Medications    remdesivir IVPB  100 mg Intravenous Q24H    pantoprazole  40 mg Oral QAM AC    amLODIPine  5 mg Oral Daily    losartan  100 mg Oral Daily    sodium chloride flush  10 mL Intravenous 2 times per day    enoxaparin  40 mg Subcutaneous Q12H    zinc sulfate  50 mg Oral Daily    Vitamin D  1,000 Units Oral Daily    ascorbic acid  500 mg Oral Daily    aspirin  81 mg Oral Daily    cefTRIAXone (ROCEPHIN) IV  1 g Intravenous Q24H    azithromycin  500 mg Intravenous Q24H    dexamethasone  6 mg Oral Daily     PRN Meds: sodium chloride, sodium chloride, sodium chloride flush, promethazine **OR** ondansetron, polyethylene glycol, acetaminophen **OR** acetaminophen, sodium chloride, potassium chloride **OR** potassium alternative oral replacement **OR** potassium chloride, magnesium sulfate      Intake/Output Summary (Last 24 hours) at 1/21/2021 1309  Last data filed at 1/21/2021 0743  Gross per 24 hour   Intake 10 ml   Output 1505 ml   Net -1495 ml       Diet:  DIET GENERAL;    Exam:  /74   Pulse 75   Temp 97.9 °F (36.6 °C) (Oral)   Resp 20   Ht 5' 6\" (1.676 m)   Wt 185 lb 11.2 oz (84.2 kg)   SpO2 94%   BMI 29.97 kg/m²   General appearance: No apparent distress, appears stated age and cooperative on HFNC. HEENT: Pupils equal, round, and reactive to light. Conjunctivae/corneas clear. Neck: Supple, with full range of motion. No jugular venous distention. Trachea midline.   Respiratory:  Diminished A/E at lung bases w/ scattered upper airway sounds. Cardiovascular: Regular rate and rhythm with normal S1/S2 without murmurs, rubs or gallops. Abdomen: Soft, non-tender, non-distended with normal bowel sounds. Musculoskeletal: passive and active ROM x 4 extremities. Skin: Skin color, texture, turgor normal.  No rashes or lesions. Neurologic:  Neurovascularly intact without any focal sensory/motor deficits. Cranial nerves: II-XII intact, grossly non-focal.  Psychiatric: Alert and oriented, thought content appropriate, normal insight  Capillary Refill: Brisk,< 3 seconds   Peripheral Pulses: +2 palpable, equal bilaterally     Labs:   Recent Labs     01/19/21  0355 01/20/21  0721 01/21/21  0154   WBC 11.4* 13.8* 12.8*   HGB 12.3* 13.3* 11.9*   HCT 35.9* 38.3* 33.9*    303 296     Recent Labs     01/19/21  0641 01/19/21  0641 01/20/21  0721 01/20/21  1316 01/21/21  0153   *   < > 134* 131* 130*   K 4.7  --  4.3  --  3.9   CL 99  --  101  --  98   CO2 22*  --  19*  --  22*   BUN 16  --  18  --  15   CREATININE 0.5  --  0.5  --  0.5   CALCIUM 7.7*  --  7.6*  --  7.4*    < > = values in this interval not displayed. Recent Labs     01/19/21  0641 01/20/21  0721 01/21/21  0153   AST 35 31 29   ALT 17 16 16   BILIDIR <0.2 <0.2 <0.2   BILITOT 0.4 0.5 0.5   ALKPHOS 82 89 79     No results for input(s): INR in the last 72 hours. No results for input(s): Francies Gathers in the last 72 hours. Microbiology:    Blood culture #1:   Lab Results   Component Value Date    BC No growth-preliminary  01/17/2021       Blood culture #2:No results found for: Shaji Mendoza    Organism:  Lab Results   Component Value Date    ORG Enterobacter cloacae complex 04/03/2018    ORG Candida parapsilosis 04/03/2018    ORG Pseudomonas aeruginosa 04/03/2018         Lab Results   Component Value Date    LABGRAM  04/03/2018     Few segmented neutrophils observed. Rare epithelial cells observed.   Rare budding yeast.         MRSA culture only:No results found for: [facility]    Urine culture: No results found for: LABURIN    Respiratory culture: No results found for: CULTRESP    Aerobic and Anaerobic :  Lab Results   Component Value Date    LABAERO  04/03/2018     No growth-preliminary  At least one isolate is resistant in vitro to current  regimen. LABAERO  04/03/2018     very light growth  Enterobacter species which may be initially susceptible to  third generation cephalosporins may develop resistance within  three to four days of initiation of this antimicrobial  therapy. LABAERO light growth 04/03/2018    LABAERO very light growth 04/03/2018     Lab Results   Component Value Date    LABANAE  04/03/2018     No anaerobes isolated- preliminary  No anaerobes isolated         Urinalysis:    No results found for: Bambi Fey, BACTERIA, RBCUA, BLOODU, SPECGRAV, GLUCOSEU    Radiology:  XR CHEST 1 VIEW   Final Result   Worsening airspace infiltrates worsening appearance of the chest.            **This report has been created using voice recognition software. It may contain minor errors which are inherent in voice recognition technology. **      Final report electronically signed by Dr. Gloria Olea on 1/20/2021 10:21 AM      CTA CHEST W WO CONTRAST   Final Result   Impression:   1. No pulmonary emboli. 2.  No thoracic aortic aneurysm or dissection   3. Bilateral perihilar and peripheral airspace disease which may    represent pulmonary edema versus atypical viral pneumonia. 4.  Small bilateral pleural fluid collections   5. Multiple hepatic hypodensities are indeterminate and may represent    cysts, hemangiomas or metastatic disease.   Nonemergent multiphase imaging    of the entire liver can be performed for further evaluation      This document has been electronically signed by: Alexandria Jackson MD on    01/17/2021 09:04 PM      All CT scans at this facility use dose modulation, iterative    reconstruction, and/or weight-based   dosing when appropriate to reduce radiation dose to as low as reasonably    achievable. XR CHEST PORTABLE   Final Result   Impression:   1. Perihilar opacities may represent pulmonary edema versus atypical    pneumonia. This document has been electronically signed by: Cat Starks MD on    01/17/2021 09:03 PM           Cta Chest W Wo Contrast    Result Date: 1/17/2021  Exam:  CTA chest with IV contrast. Procedure: MIP reconstructions are performed Comparison: Chest x-ray 1/17/2021 Findings: No pulmonary emboli. No thoracic aortic aneurysm or dissection. No hilar or mediastinal adenopathy. No pericardial fluid collection. No pneumothorax or pneumomediastinum. Bilateral perihilar and peripheral airspace disease may represent pulmonary edema versus atypical viral pneumonia. Small bilateral pleural fluid collections. Small hiatal hernia. Calcifications in the spleen and right hilar lymph nodes, consistent with previous granulomatous disease. Multiple hepatic hypodensities are indeterminate and may represent cysts, hemangiomas or metastatic disease. No thoracic compression fracture. Impression: 1. No pulmonary emboli. 2.  No thoracic aortic aneurysm or dissection 3. Bilateral perihilar and peripheral airspace disease which may represent pulmonary edema versus atypical viral pneumonia. 4.  Small bilateral pleural fluid collections 5. Multiple hepatic hypodensities are indeterminate and may represent cysts, hemangiomas or metastatic disease. Nonemergent multiphase imaging of the entire liver can be performed for further evaluation This document has been electronically signed by: Cat Starks MD on 01/17/2021 09:04 PM All CT scans at this facility use dose modulation, iterative reconstruction, and/or weight-based dosing when appropriate to reduce radiation dose to as low as reasonably achievable.      Xr Chest Portable    Result Date: 1/17/2021  Exam: One View of the chest Comparison: 4/4/2018 Findings: New perihilar opacities may represent pulmonary edema versus atypical pneumonia. Cardiac silhouette is not enlarged. No pneumothorax. No pleural fluid collection. Old left-sided rib fractures     Impression: 1. Perihilar opacities may represent pulmonary edema versus atypical pneumonia. This document has been electronically signed by: Prachi Willis MD on 01/17/2021 09:03 PM     With RN in room, patient was updated about and agreed upon the treatment plan, all the questions and concerns were addressed. Patient was seen in PPE (PERSONAL PROTECTIVE EQUIPMENT). Patient was interviewed in Strict Airborne and Droplet Precautions.     Electronically signed by Curtis Cuevas MD on 1/21/2021 at 1:09 PM

## 2021-01-21 NOTE — PROGRESS NOTES
Pt nares cleansed with nasal swabs. Chap stick applied for comfort. Pillow placed under left side. No concerns voiced at this time. Resting with eyes closed.

## 2021-01-22 ENCOUNTER — APPOINTMENT (OUTPATIENT)
Dept: CT IMAGING | Age: 81
DRG: 177 | End: 2021-01-22
Payer: MEDICARE

## 2021-01-22 LAB
ALBUMIN SERPL-MCNC: 2.5 G/DL (ref 3.5–5.1)
ALP BLD-CCNC: 86 U/L (ref 38–126)
ALT SERPL-CCNC: 20 U/L (ref 11–66)
ANION GAP SERPL CALCULATED.3IONS-SCNC: 11 MEQ/L (ref 8–16)
AST SERPL-CCNC: 30 U/L (ref 5–40)
BASOPHILS # BLD: 0.1 %
BASOPHILS ABSOLUTE: 0 THOU/MM3 (ref 0–0.1)
BILIRUB SERPL-MCNC: 0.6 MG/DL (ref 0.3–1.2)
BILIRUBIN DIRECT: 0.3 MG/DL (ref 0–0.3)
BUN BLDV-MCNC: 17 MG/DL (ref 7–22)
CALCIUM SERPL-MCNC: 7.8 MG/DL (ref 8.5–10.5)
CHLORIDE BLD-SCNC: 99 MEQ/L (ref 98–111)
CO2: 23 MEQ/L (ref 23–33)
CREAT SERPL-MCNC: 0.6 MG/DL (ref 0.4–1.2)
EOSINOPHIL # BLD: 0 %
EOSINOPHILS ABSOLUTE: 0 THOU/MM3 (ref 0–0.4)
ERYTHROCYTE [DISTWIDTH] IN BLOOD BY AUTOMATED COUNT: 12.8 % (ref 11.5–14.5)
ERYTHROCYTE [DISTWIDTH] IN BLOOD BY AUTOMATED COUNT: 46.2 FL (ref 35–45)
GFR SERPL CREATININE-BSD FRML MDRD: > 90 ML/MIN/1.73M2
GLUCOSE BLD-MCNC: 113 MG/DL (ref 70–108)
HCT VFR BLD CALC: 35.7 % (ref 42–52)
HEMOGLOBIN: 12.2 GM/DL (ref 14–18)
IMMATURE GRANS (ABS): 0.12 THOU/MM3 (ref 0–0.07)
IMMATURE GRANULOCYTES: 0.9 %
LYMPHOCYTES # BLD: 5.3 %
LYMPHOCYTES ABSOLUTE: 0.7 THOU/MM3 (ref 1–4.8)
MCH RBC QN AUTO: 33.2 PG (ref 26–33)
MCHC RBC AUTO-ENTMCNC: 34.2 GM/DL (ref 32.2–35.5)
MCV RBC AUTO: 97 FL (ref 80–94)
MONOCYTES # BLD: 3.4 %
MONOCYTES ABSOLUTE: 0.5 THOU/MM3 (ref 0.4–1.3)
NUCLEATED RED BLOOD CELLS: 0 /100 WBC
PLATELET # BLD: 314 THOU/MM3 (ref 130–400)
PMV BLD AUTO: 9 FL (ref 9.4–12.4)
POTASSIUM SERPL-SCNC: 4.5 MEQ/L (ref 3.5–5.2)
RBC # BLD: 3.68 MILL/MM3 (ref 4.7–6.1)
SEG NEUTROPHILS: 90.3 %
SEGMENTED NEUTROPHILS ABSOLUTE COUNT: 12.6 THOU/MM3 (ref 1.8–7.7)
SODIUM BLD-SCNC: 133 MEQ/L (ref 135–145)
TOTAL PROTEIN: 5.6 G/DL (ref 6.1–8)
WBC # BLD: 14 THOU/MM3 (ref 4.8–10.8)

## 2021-01-22 PROCEDURE — 97110 THERAPEUTIC EXERCISES: CPT

## 2021-01-22 PROCEDURE — 2580000003 HC RX 258: Performed by: INTERNAL MEDICINE

## 2021-01-22 PROCEDURE — 6360000002 HC RX W HCPCS: Performed by: INTERNAL MEDICINE

## 2021-01-22 PROCEDURE — 2060000000 HC ICU INTERMEDIATE R&B

## 2021-01-22 PROCEDURE — 97530 THERAPEUTIC ACTIVITIES: CPT

## 2021-01-22 PROCEDURE — 82248 BILIRUBIN DIRECT: CPT

## 2021-01-22 PROCEDURE — 85025 COMPLETE CBC W/AUTO DIFF WBC: CPT

## 2021-01-22 PROCEDURE — 2500000003 HC RX 250 WO HCPCS: Performed by: INTERNAL MEDICINE

## 2021-01-22 PROCEDURE — 36415 COLL VENOUS BLD VENIPUNCTURE: CPT

## 2021-01-22 PROCEDURE — 71250 CT THORAX DX C-: CPT

## 2021-01-22 PROCEDURE — 6370000000 HC RX 637 (ALT 250 FOR IP): Performed by: HOSPITALIST

## 2021-01-22 PROCEDURE — 80053 COMPREHEN METABOLIC PANEL: CPT

## 2021-01-22 PROCEDURE — 94660 CPAP INITIATION&MGMT: CPT

## 2021-01-22 PROCEDURE — 6370000000 HC RX 637 (ALT 250 FOR IP): Performed by: PHYSICIAN ASSISTANT

## 2021-01-22 PROCEDURE — 99233 SBSQ HOSP IP/OBS HIGH 50: CPT | Performed by: INTERNAL MEDICINE

## 2021-01-22 PROCEDURE — 6360000002 HC RX W HCPCS: Performed by: PHYSICIAN ASSISTANT

## 2021-01-22 PROCEDURE — 94669 MECHANICAL CHEST WALL OSCILL: CPT

## 2021-01-22 PROCEDURE — 2700000000 HC OXYGEN THERAPY PER DAY

## 2021-01-22 PROCEDURE — 2580000003 HC RX 258: Performed by: PHYSICIAN ASSISTANT

## 2021-01-22 PROCEDURE — 94761 N-INVAS EAR/PLS OXIMETRY MLT: CPT

## 2021-01-22 RX ORDER — FUROSEMIDE 10 MG/ML
40 INJECTION INTRAMUSCULAR; INTRAVENOUS ONCE
Status: COMPLETED | OUTPATIENT
Start: 2021-01-22 | End: 2021-01-22

## 2021-01-22 RX ADMIN — AZITHROMYCIN DIHYDRATE 500 MG: 500 INJECTION, POWDER, LYOPHILIZED, FOR SOLUTION INTRAVENOUS at 23:35

## 2021-01-22 RX ADMIN — SODIUM CHLORIDE, PRESERVATIVE FREE 10 ML: 5 INJECTION INTRAVENOUS at 08:09

## 2021-01-22 RX ADMIN — DEXAMETHASONE 6 MG: 4 TABLET ORAL at 08:08

## 2021-01-22 RX ADMIN — ENOXAPARIN SODIUM 40 MG: 40 INJECTION SUBCUTANEOUS at 23:35

## 2021-01-22 RX ADMIN — ASPIRIN 81 MG: 81 TABLET, CHEWABLE ORAL at 08:08

## 2021-01-22 RX ADMIN — PANTOPRAZOLE SODIUM 40 MG: 40 TABLET, DELAYED RELEASE ORAL at 08:08

## 2021-01-22 RX ADMIN — ENOXAPARIN SODIUM 40 MG: 40 INJECTION SUBCUTANEOUS at 12:27

## 2021-01-22 RX ADMIN — AMLODIPINE BESYLATE 5 MG: 5 TABLET ORAL at 08:08

## 2021-01-22 RX ADMIN — REMDESIVIR 100 MG: 100 INJECTION, POWDER, LYOPHILIZED, FOR SOLUTION INTRAVENOUS at 03:58

## 2021-01-22 RX ADMIN — ENOXAPARIN SODIUM 40 MG: 40 INJECTION SUBCUTANEOUS at 03:58

## 2021-01-22 RX ADMIN — SODIUM CHLORIDE, PRESERVATIVE FREE 10 ML: 5 INJECTION INTRAVENOUS at 20:23

## 2021-01-22 RX ADMIN — OXYCODONE HYDROCHLORIDE AND ACETAMINOPHEN 500 MG: 500 TABLET ORAL at 08:08

## 2021-01-22 RX ADMIN — FUROSEMIDE 40 MG: 10 INJECTION, SOLUTION INTRAMUSCULAR; INTRAVENOUS at 12:27

## 2021-01-22 RX ADMIN — Medication 1000 UNITS: at 08:08

## 2021-01-22 RX ADMIN — LOSARTAN POTASSIUM 100 MG: 100 TABLET, FILM COATED ORAL at 08:08

## 2021-01-22 RX ADMIN — Medication 50 MG: at 08:08

## 2021-01-22 RX ADMIN — CEFTRIAXONE SODIUM 1 G: 1 INJECTION, POWDER, FOR SOLUTION INTRAMUSCULAR; INTRAVENOUS at 20:23

## 2021-01-22 NOTE — PROGRESS NOTES
[address] [address]  [facility] CVICU 4B  Occupational Therapy  Daily Note  Time:    Time In: 1140  Time Out: 1205  Timed Code Treatment Minutes: 25 Minutes  Minutes: 25          Date: 2021  Patient Name: Shashank Booth,   Gender: male      Room: [facility]05/005-A  MRN: [id number]  : 1940  (79 y.o.)  Referring Practitioner: Dr. Hank Mancini MD  Diagnosis: Acute Hypoxemic Respiratory Failure due to COVID-19  Additional Pertinent Hx: Pt presents to the Emergency Department for the evaluation of cough and shortness of breath. Patient reports gradual onset of symptoms over the past 5 days with significant worsening of the shortness of breath over the past 2 days. He reports unproductive cough associated with decreased sense of taste and smell and some nasal congestion over the past week. Denies any known sick contacts and has not been tested for Covid since symptom onset. He reports worsening of shortness of breath with exertion, improvement with rest.  Reports generalized weakness that occurs with activity. Restrictions/Precautions:  Restrictions/Precautions: General Precautions, Fall Risk, Isolation  Position Activity Restriction  Other position/activity restrictions: COVID+;  on BiPAP with settings 65%, PEEP 12;  on BiPAP and at 65%, PEEP 10. high flow       SUBJECTIVE: Oralia Lara RN approving session stating she prefers to not put pt up to chair. Pt on continuous bipap at this time. Pt very agreeabl eto OT session. PAIN: 0/10:      COGNITION: WNL    ADL:   Lower Extremity Dressing: Maximum Assistance. [place] BALANCE:  Sitting Balance:  Stand By Assistance. at EOB during ex  Standing Balance: Minimal Assistance. with bilateral UE support on therapist arms for approx 10 sec to allow for arranging of sheets    BED MOBILITY:  Supine to Sit: Minimal Assistance    Sit to Supine: Minimal Assistance      TRANSFERS:  Sit to Stand:  Minimal Assistance. from eOB   Stand to Sit: [employer]. onto eOB    FUNCTIONAL MOBILITY:  Assistive Device: hand held assist   Assist Level:  Minimal Assistance. Distance: [facility] to [facility]  Pt usnteady throughout and would benefti from use of AD when able to ambulate further distances     ADDITIONAL ACTIVITIES:  Pt on continuous bipap at this time. Pts O2 sats remained above 93% while sitting EOB and completing ex. Sats did decrease to 90% when pt coughed. Pts respiration rated ranged from in the 30s with ex to 20s at rest.     ASSESSMENT:     Activity Tolerance:  Patient tolerance of  treatment: fair. Frequent rest breaks throughout       Discharge Recommendations: Continue to assess pending progress, Subacute/Skilled Nursing Facility    Equipment Recommendations: Equipment Needed: Yes  Other: Shower chair  Plan: Times per week: 5x  Specific instructions for Next Treatment: Functional mobility as able; ADLs and energy conservation techniques; UE exercises and proper breathing technique  Current Treatment Recommendations: Functional Mobility Training, Endurance Training, Self-Care / ADL, Patient/Caregiver Education & Training  Plan Comment: Pt would benefit from continued skilled OT services when medically stable and discharged from Acute. Recommend a stay on TCU prior to returning home. Patient Education  Patient Education: safety with transfers    Goals  Short term goals  Time Frame for Short term goals: By discharge  Short term goal 1: Pt will demonstrate simple standing ADLs while following pursed lip breathing with SBA maintain O2 saturation > than 90% for increased activity tolerance. Short term goal 2: Pt will complete a spongebath or other ADL while following energy conservation techniques with setup A and verbal cues if needed to increase his endurance for doing his morning ADLs.   Short term goal 3: Pt will complete BUE ROM/moderate resistance exercises while following proper breathing technique to increase his strength and endurance for ease of doing light homemaking or shopping. Short term goal 4: Pt will further demonstrate functional mobility with OTR to prepare for doing self while out of bed. Following session, patient left in safe position with all fall risk precautions in place.         \

## 2021-01-22 NOTE — CARE COORDINATION
DISASTER CHARTING    1/22/21, 11:37 AM EST    DISCHARGE ONGOING EVALUATION:     Jaqueline Sullivan day: 5  Location: [facility]05/005-A Reason for admit: Acute hypoxemic respiratory failure due to COVID-19 ([facility] [address] [U07.1, J96.01]   Barriers to Discharge: Tmax 98.2, high flow oxygen at 75% FiO2, 50 liters oxygen with saturations at 92%. PT/OT, Vitamin C,D,zinc, IV antibiotics, Decadron, electrolyte replacement protocols. PCP: Amador Song MD  Readmission Risk Score: 14%  Patient Goals/Plan/Treatment Preferences: From home with spouse. Plan is to retuirn home, however wife is hospitalized with Covid as well. Will follow for potential needs. [facility] if home O2 needed.

## 2021-01-22 NOTE — PROGRESS NOTES
Pt was high flow doing well; however, pt had to go to CT and SPO2 was only 85% on a non rebreather at 15LPM, so pt was placed back on BIPAP and transported to CT scanning. Pt was left on high flow upon return to the floor because he was dozing off and wanted to take a nap. Will placed back on high flow oxygen later. High flow FIO2/PEEP adjusted to [employer] protocol for the pt. Will follow and adjust as needed. Dr Penny Talley updated.

## 2021-01-22 NOTE — PROGRESS NOTES
Hospitalist Progress Note      Patient:  Vikram Marcus    Unit/Bed:4B-05/005-A  YOB: 1940  MRN: [id number]   Acct: [de-identified]   PCP: Tashia Schafer MD  Date of Admission: 1/17/2021    Assessment/Plan:    1. Acute hypoxic respiratory failure due to Covid-19 PNA: on HFO fiO2 95% at 60 lpm w/ SaO2 90%. On Dexamethasone (added PPI), Remdesivir, zinc, vitamin C, vitamin D, aspirin, LMWH BID dosing, s/p convalescent plasma; procal elevated; on Azirtho/ceftriaxone for possible superimposed bacterial PNA, prothrombotic levels elevated; CTPE negative; added ASA 81 QD. IS/Acapella/PT to see. Wean supplemental O2 as tolerated to SpO2 > 90%    1/19: improving slowly. Slowly weaning; CCM. Chest physio ordered  1/20: pt still on HFNC, cont current management. Will give Plasma today (2U), cont Decadron (Day 4), Remdisivr (Day 3), Vitamin C, D, Zinc, ASA 81, Lovenox BID, Acapella and IS.   1/21: went from BiPAP to HFNC today. Will cont current management. Will give Plasma today (2U), cont Decadron (Day 5), Remdisivr (Day 4), IV ABx (Day 5), Vitamin C, D, Zinc, ASA 81, Lovenox BID, Acapella and IS.   1/22: pt desaturated to 70's on HFNC, placed back on BiPAP. CT Chest ordered and showing improved aeration however worsening effusions. Will give IV lasix 40 x 1 dose, given Plasma (2U), cont Decadron (Day 6), Remdisivr (Day 5), IV ABx (Day 6), Vitamin C, D, Zinc, ASA 81, Lovenox BID, Acapella and IS. 2. Hyponatremia: Na 124 on arrival, no prior to compare; likely hypovolemic d/t inadequate PO intake; pt received 1.0 L NS bolus in ED. Started on NS at 100 cc/hr; will trend serum Na to reassess response; urine lytes/Osm sent as well.     1/21: Na down to 130 today. NS Infusion stopped yesterday. Will continue to trend Na levels and if declines again will consult Nephrology. 1/22: Na up to 133 today. Cont to trend daily.        3. Essential hypertension: continue home medications, fairly controlled; will monitor. 4. Hx of RCC: s/p R nephrectomy  5. Code status: Full code; palliative care to see  6. PT/OT to see    Chief Complaint: SOB    Initial H and P:-    Admitted for management of Acute hypoxic respiratory failure secondary to COVID 19      Subjective (past 24 hours): No new issues overnight. Denies CP/fever/chills/palpitation/ or GI symptoms. Tolerating PO intake. On BiPAP. Pt still having cough and sob. No diarrhea. Past medical history, family history, social history and allergies reviewed again and is unchanged since admission. ROS (12 point review of systems completed. Pertinent positives noted.  Otherwise ROS is negative)     Medications:  Reviewed    Infusion Medications    sodium chloride      sodium chloride       Scheduled Medications    furosemide  40 mg Intravenous Once    pantoprazole  40 mg Oral QAM AC    amLODIPine  5 mg Oral Daily    losartan  100 mg Oral Daily    sodium chloride flush  10 mL Intravenous 2 times per day    enoxaparin  40 mg Subcutaneous Q12H    zinc sulfate  50 mg Oral Daily    Vitamin D  1,000 Units Oral Daily    ascorbic acid  500 mg Oral Daily    aspirin  81 mg Oral Daily    cefTRIAXone (ROCEPHIN) IV  1 g Intravenous Q24H    azithromycin  500 mg Intravenous Q24H    dexamethasone  6 mg Oral Daily     PRN Meds: sodium chloride, sodium chloride, sodium chloride flush, promethazine **OR** ondansetron, polyethylene glycol, acetaminophen **OR** acetaminophen, sodium chloride, potassium chloride **OR** potassium alternative oral replacement **OR** potassium chloride, magnesium sulfate      Intake/Output Summary (Last 24 hours) at 1/22/2021 1153  Last data filed at 1/22/2021 0732  Gross per 24 hour   Intake 680 ml   Output 2125 ml   Net -1445 ml       Diet:  DIET GENERAL;  Dietary Nutrition Supplements: Standard High Calorie Oral Supplement    Exam:  BP (!) 153/73   Pulse 65   Temp 98 °F (36.7 °C)   Resp 25 Ht 5' 6\" (1.676 m)   Wt 185 lb 11.2 oz (84.2 kg)   SpO2 95%   BMI 29.97 kg/m²   General appearance: No apparent distress, appears stated age and cooperative on BiPAP. HEENT: Pupils equal, round, and reactive to light. Conjunctivae/corneas clear. Neck: Supple, with full range of motion. No jugular venous distention. Trachea midline. Respiratory:  Diminished A/E at lung bases w/ scattered upper airway sounds. Cardiovascular: Regular rate and rhythm with normal S1/S2 without murmurs, rubs or gallops. Abdomen: Soft, non-tender, non-distended with normal bowel sounds. Musculoskeletal: passive and active ROM x 4 extremities. Skin: Skin color, texture, turgor normal.  No rashes or lesions. Neurologic:  Neurovascularly intact without any focal sensory/motor deficits. Cranial nerves: II-XII intact, grossly non-focal.  Psychiatric: Alert and oriented, thought content appropriate, normal insight  Capillary Refill: Brisk,< 3 seconds   Peripheral Pulses: +2 palpable, equal bilaterally     Labs:   Recent Labs     01/20/21  0721 01/21/21  0154 01/22/21  0426   WBC 13.8* 12.8* 14.0*   HGB 13.3* 11.9* 12.2*   HCT 38.3* 33.9* 35.7*    296 314     Recent Labs     01/20/21  0721 01/20/21  1316 01/21/21  0153 01/22/21  0426   * 131* 130* 133*   K 4.3  --  3.9 4.5     --  98 99   CO2 19*  --  22* 23   BUN 18  --  15 17   CREATININE 0.5  --  0.5 0.6   CALCIUM 7.6*  --  7.4* 7.8*     Recent Labs     01/20/21  0721 01/21/21  0153 01/22/21  0426   AST 31 29 30   ALT 16 16 20   BILIDIR <0.2 <0.2 0.3   BILITOT 0.5 0.5 0.6   ALKPHOS 89 79 86     No results for input(s): INR in the last 72 hours. No results for input(s): [place] in the last 72 hours.     Microbiology:    Blood culture #1:   Lab Results   Component Value Date    BC No growth-preliminary  01/17/2021       Blood culture #2:No results found for: BLOODCULT2    Organism:  Lab Results   Component Value Date    ORG Enterobacter cloacae complex Final Result   Impression:   1. No pulmonary emboli. 2.  No thoracic aortic aneurysm or dissection   3. Bilateral perihilar and peripheral airspace disease which may    represent pulmonary edema versus atypical viral pneumonia. 4.  Small bilateral pleural fluid collections   5. Multiple hepatic hypodensities are indeterminate and may represent    cysts, hemangiomas or metastatic disease. Nonemergent multiphase imaging    of the entire liver can be performed for further evaluation      This document has been electronically signed by: Donato Mejía MD on    01/17/2021 09:04 PM      All CT scans at this facility use dose modulation, iterative    reconstruction, and/or weight-based   dosing when appropriate to reduce radiation dose to as low as reasonably    achievable. XR CHEST PORTABLE   Final Result   Impression:   1. Perihilar opacities may represent pulmonary edema versus atypical    pneumonia. This document has been electronically signed by: Donato Mejía MD on    01/17/2021 09:03 PM           Cta Chest W Wo Contrast    Result Date: 1/17/2021  Exam:  CTA chest with IV contrast. Procedure: MIP reconstructions are performed Comparison: Chest x-ray 1/17/2021 Findings: No pulmonary emboli. No thoracic aortic aneurysm or dissection. No hilar or mediastinal adenopathy. No pericardial fluid collection. No pneumothorax or pneumomediastinum. Bilateral perihilar and peripheral airspace disease may represent pulmonary edema versus atypical viral pneumonia. Small bilateral pleural fluid collections. Small hiatal hernia. Calcifications in the spleen and right hilar lymph nodes, consistent with previous granulomatous disease. Multiple hepatic hypodensities are indeterminate and may represent cysts, hemangiomas or metastatic disease. No thoracic compression fracture. Impression: 1. No pulmonary emboli. 2.  No thoracic aortic aneurysm or dissection 3.   Bilateral perihilar and peripheral airspace disease which may represent pulmonary edema versus atypical viral pneumonia. 4.  Small bilateral pleural fluid collections 5. Multiple hepatic hypodensities are indeterminate and may represent cysts, hemangiomas or metastatic disease. Nonemergent multiphase imaging of the entire liver can be performed for further evaluation This document has been electronically signed by: Huber Ogden MD on 01/17/2021 09:04 PM All CT scans at this facility use dose modulation, iterative reconstruction, and/or weight-based dosing when appropriate to reduce radiation dose to as low as reasonably achievable. Xr Chest Portable    Result Date: 1/17/2021  Exam: One View of the chest Comparison: 4/4/2018 Findings: New perihilar opacities may represent pulmonary edema versus atypical pneumonia. Cardiac silhouette is not enlarged. No pneumothorax. No pleural fluid collection. Old left-sided rib fractures     Impression: 1. Perihilar opacities may represent pulmonary edema versus atypical pneumonia. This document has been electronically signed by: Huber Ogden MD on 01/17/2021 09:03 PM     With RN in room, patient was updated about and agreed upon the treatment plan, all the questions and concerns were addressed. Patient was seen in PPE (PERSONAL PROTECTIVE EQUIPMENT). Patient was interviewed in Strict Airborne and Droplet Precautions.     Electronically signed by Marcial Garcia MD on 1/22/2021 at 11:53 AM

## 2021-01-22 NOTE — PROGRESS NOTES
Interventions:   Food and/or Nutrient Delivery:  Continue Current Diet, Start Oral Nutrition Supplement  Nutrition Education/Counseling:  Education not appropriate   Coordination of Nutrition Care:  Continue to monitor while inpatient    Goals:  Pt. will tolerate and consume 75% or more of meals during LOS. Nutrition Monitoring and Evaluation:   Behavioral-Environmental Outcomes:  None Identified   Food/Nutrient Intake Outcomes:  Diet Advancement/Tolerance, Food and Nutrient Intake, Supplement Intake  Physical Signs/Symptoms Outcomes:  Biochemical Data, Chewing or Swallowing, GI Status, Fluid Status or Edema, Nutrition Focused Physical Findings, Skin, Weight     Discharge Planning:     Too soon to determine     Electronically signed by Robson Alvarado RD, LD on 1/22/21 at 9:56 AM EST    Contact: [phone]

## 2021-01-23 LAB
ALBUMIN SERPL-MCNC: 2.8 G/DL (ref 3.5–5.1)
ALP BLD-CCNC: 90 U/L (ref 38–126)
ALT SERPL-CCNC: 18 U/L (ref 11–66)
ANION GAP SERPL CALCULATED.3IONS-SCNC: 11 MEQ/L (ref 8–16)
AST SERPL-CCNC: 25 U/L (ref 5–40)
BASOPHILS # BLD: 0.1 %
BASOPHILS ABSOLUTE: 0 THOU/MM3 (ref 0–0.1)
BILIRUB SERPL-MCNC: 0.7 MG/DL (ref 0.3–1.2)
BILIRUBIN DIRECT: 0.3 MG/DL (ref 0–0.3)
BLOOD CULTURE, ROUTINE: NORMAL
BLOOD CULTURE, ROUTINE: NORMAL
BUN BLDV-MCNC: 20 MG/DL (ref 7–22)
CALCIUM SERPL-MCNC: 8 MG/DL (ref 8.5–10.5)
CHLORIDE BLD-SCNC: 98 MEQ/L (ref 98–111)
CO2: 26 MEQ/L (ref 23–33)
CREAT SERPL-MCNC: 0.6 MG/DL (ref 0.4–1.2)
EOSINOPHIL # BLD: 0 %
EOSINOPHILS ABSOLUTE: 0 THOU/MM3 (ref 0–0.4)
ERYTHROCYTE [DISTWIDTH] IN BLOOD BY AUTOMATED COUNT: 12.8 % (ref 11.5–14.5)
ERYTHROCYTE [DISTWIDTH] IN BLOOD BY AUTOMATED COUNT: 45.5 FL (ref 35–45)
GFR SERPL CREATININE-BSD FRML MDRD: > 90 ML/MIN/1.73M2
GLUCOSE BLD-MCNC: 93 MG/DL (ref 70–108)
HCT VFR BLD CALC: 37.4 % (ref 42–52)
HEMOGLOBIN: 13 GM/DL (ref 14–18)
IMMATURE GRANS (ABS): 0.15 THOU/MM3 (ref 0–0.07)
IMMATURE GRANULOCYTES: 1.1 %
LYMPHOCYTES # BLD: 6.2 %
LYMPHOCYTES ABSOLUTE: 0.9 THOU/MM3 (ref 1–4.8)
MAGNESIUM: 2.2 MG/DL (ref 1.6–2.4)
MCH RBC QN AUTO: 33.7 PG (ref 26–33)
MCHC RBC AUTO-ENTMCNC: 34.8 GM/DL (ref 32.2–35.5)
MCV RBC AUTO: 96.9 FL (ref 80–94)
MONOCYTES # BLD: 3.3 %
MONOCYTES ABSOLUTE: 0.5 THOU/MM3 (ref 0.4–1.3)
NUCLEATED RED BLOOD CELLS: 0 /100 WBC
PLATELET # BLD: 362 THOU/MM3 (ref 130–400)
PMV BLD AUTO: 8.9 FL (ref 9.4–12.4)
POTASSIUM SERPL-SCNC: 4.2 MEQ/L (ref 3.5–5.2)
RBC # BLD: 3.86 MILL/MM3 (ref 4.7–6.1)
SEG NEUTROPHILS: 89.3 %
SEGMENTED NEUTROPHILS ABSOLUTE COUNT: 12.5 THOU/MM3 (ref 1.8–7.7)
SODIUM BLD-SCNC: 135 MEQ/L (ref 135–145)
TOTAL PROTEIN: 5.9 G/DL (ref 6.1–8)
WBC # BLD: 14 THOU/MM3 (ref 4.8–10.8)

## 2021-01-23 PROCEDURE — 2580000003 HC RX 258: Performed by: INTERNAL MEDICINE

## 2021-01-23 PROCEDURE — 2700000000 HC OXYGEN THERAPY PER DAY

## 2021-01-23 PROCEDURE — 82248 BILIRUBIN DIRECT: CPT

## 2021-01-23 PROCEDURE — 2580000003 HC RX 258: Performed by: PHYSICIAN ASSISTANT

## 2021-01-23 PROCEDURE — 85025 COMPLETE CBC W/AUTO DIFF WBC: CPT

## 2021-01-23 PROCEDURE — 6360000002 HC RX W HCPCS: Performed by: INTERNAL MEDICINE

## 2021-01-23 PROCEDURE — 83735 ASSAY OF MAGNESIUM: CPT

## 2021-01-23 PROCEDURE — 6370000000 HC RX 637 (ALT 250 FOR IP): Performed by: PHYSICIAN ASSISTANT

## 2021-01-23 PROCEDURE — 6360000002 HC RX W HCPCS: Performed by: PHYSICIAN ASSISTANT

## 2021-01-23 PROCEDURE — 80053 COMPREHEN METABOLIC PANEL: CPT

## 2021-01-23 PROCEDURE — 94761 N-INVAS EAR/PLS OXIMETRY MLT: CPT

## 2021-01-23 PROCEDURE — 99233 SBSQ HOSP IP/OBS HIGH 50: CPT | Performed by: INTERNAL MEDICINE

## 2021-01-23 PROCEDURE — 6370000000 HC RX 637 (ALT 250 FOR IP): Performed by: HOSPITALIST

## 2021-01-23 PROCEDURE — 94669 MECHANICAL CHEST WALL OSCILL: CPT

## 2021-01-23 PROCEDURE — 2060000000 HC ICU INTERMEDIATE R&B

## 2021-01-23 PROCEDURE — 36415 COLL VENOUS BLD VENIPUNCTURE: CPT

## 2021-01-23 RX ORDER — FUROSEMIDE 10 MG/ML
40 INJECTION INTRAMUSCULAR; INTRAVENOUS ONCE
Status: COMPLETED | OUTPATIENT
Start: 2021-01-23 | End: 2021-01-23

## 2021-01-23 RX ADMIN — DEXAMETHASONE 6 MG: 4 TABLET ORAL at 10:03

## 2021-01-23 RX ADMIN — SODIUM CHLORIDE, PRESERVATIVE FREE 10 ML: 5 INJECTION INTRAVENOUS at 10:38

## 2021-01-23 RX ADMIN — ASPIRIN 81 MG: 81 TABLET, CHEWABLE ORAL at 10:38

## 2021-01-23 RX ADMIN — SODIUM CHLORIDE, PRESERVATIVE FREE 10 ML: 5 INJECTION INTRAVENOUS at 20:20

## 2021-01-23 RX ADMIN — Medication 1000 UNITS: at 10:03

## 2021-01-23 RX ADMIN — CEFTRIAXONE SODIUM 1 G: 1 INJECTION, POWDER, FOR SOLUTION INTRAMUSCULAR; INTRAVENOUS at 20:20

## 2021-01-23 RX ADMIN — PANTOPRAZOLE SODIUM 40 MG: 40 TABLET, DELAYED RELEASE ORAL at 06:44

## 2021-01-23 RX ADMIN — Medication 50 MG: at 10:03

## 2021-01-23 RX ADMIN — ENOXAPARIN SODIUM 40 MG: 40 INJECTION SUBCUTANEOUS at 16:03

## 2021-01-23 RX ADMIN — AMLODIPINE BESYLATE 5 MG: 5 TABLET ORAL at 10:03

## 2021-01-23 RX ADMIN — OXYCODONE HYDROCHLORIDE AND ACETAMINOPHEN 500 MG: 500 TABLET ORAL at 10:03

## 2021-01-23 RX ADMIN — ENOXAPARIN SODIUM 40 MG: 40 INJECTION SUBCUTANEOUS at 23:16

## 2021-01-23 RX ADMIN — FUROSEMIDE 40 MG: 10 INJECTION, SOLUTION INTRAMUSCULAR; INTRAVENOUS at 10:38

## 2021-01-23 ASSESSMENT — PAIN SCALES - GENERAL
PAINLEVEL_OUTOF10: 0
PAINLEVEL_OUTOF10: 0

## 2021-01-23 NOTE — PLAN OF CARE
Problem: Impaired respiratory status  Goal: Able to breathe comfortably  Description: Able to breathe comfortably  Outcome: Ongoing   Pt on hfnc  60 lpm @ 70% fio2---sats 95  Pt tolerates vest therapy well

## 2021-01-23 NOTE — PROGRESS NOTES
[address] [address]  INPATIENT PHYSICAL THERAPY  DAILY NOTE  [facility] CVICU 4B - 4B-05/005-A     Time In: 8088  Time Out: 5346  Timed Code Treatment Minutes: 45 Minutes  Minutes: 38          Date: 2021  Patient Name: Lloyd Iyer,  Gender:  male        MRN: [id number]  : 1940  (79 y.o.)     Referring Practitioner: Jermaine Babin MD  Diagnosis: Acute hypoxemic respiratory failure due to COVID-19  Additional Pertinent Hx: Per ED note, pt \"is a 79 y.o. male who presents to the Emergency Department for the evaluation of cough and shortness of breath. Patient reports gradual onset of symptoms over the past 5 days with significant worsening of the shortness of breath over the past 2 days. He reports unproductive cough associated with decreased sense of taste and smell and some nasal congestion over the past week. Denies any known sick contacts and has not been tested for Covid since symptom onset. He reports worsening of shortness of breath with exertion, improvement with rest.  Reports generalized weakness that occurs with activity. He has been taking Tylenol and was also started on vitamin C, vitamin D, zinc by his daughter earlier in the season. He denies any pulmonary history. He is not a smoker. He denies any associated fevers, chills, generalized body aches, headache, sore throat, vomiting, diarrhea, wheezing, edema, orthopnea or dizziness. \"     Prior Level of Function:  Lives With: Spouse  Type of Home: House  Home Layout: One level  Home Access: Stairs to enter with rails  Entrance Stairs - Number of Steps: 3-4 steps with 1 rail  Home Equipment: (none)   Bathroom Shower/Tub: Walk-in shower  Bathroom Toilet: Standard  Bathroom Equipment: Grab bars in shower  Bathroom Accessibility: Accessible    Receives Help From: Family  ADL Assistance: [facility] [address]: Independent  Homemaking Responsibilities: Yes  Ambulation Assistance: Independent  Transfer Assistance: generally 87 to 95% throughout session  Activity Tolerance:  Patient tolerance of  treatment: good. With High flow O2 and rest breaks. Equipment Recommendations: Other: monitor for needs - poss RW at discharge? Discharge Recommendations:   Continue to assess pending progress    Plan: Times per week: 4-5x GM  Current Treatment Recommendations: Strengthening, Gait Training, Patient/Caregiver Education & Training, Equipment Evaluation, Education, & procurement, Balance Training, Functional Mobility Training, Endurance Training, Transfer Training, Safety Education & Training, Home Exercise Program    Patient Education  Patient Education: Plan of Care, Home Exercise Program    Goals:  Patient goals : go home  Short term goals  Time Frame for Short term goals: at discharge  Short term goal 1: Pt to be Mod I for supine <> sit to get in/out of bed  Short term goal 2: Pt to be Mod I for sit <> stand to get up to ambulate  Short term goal 3: Pt to ambulate > 60 ft with/without AD with Supervision for household distances  Long term goals  Time Frame for Long term goals : not set due to short ELOS    Following session, patient left in safe position with all fall risk precautions in place. Jillian Che.  [place] [place] [employer]

## 2021-01-23 NOTE — PROGRESS NOTES
Hospitalist Progress Note      Patient:  Noel Nguyen    Unit/Bed:4B-05/005-A  YOB: 1940  MRN: [id number]   Acct: [de-identified]   PCP: Kylee Mccormack MD  Date of Admission: 1/17/2021    Assessment/Plan:    1. Acute hypoxic respiratory failure due to Covid-19 PNA: on HFO fiO2 95% at 60 lpm w/ SaO2 90%. On Dexamethasone (added PPI), Remdesivir, zinc, vitamin C, vitamin D, aspirin, LMWH BID dosing, s/p convalescent plasma; procal elevated; on Azirtho/ceftriaxone for possible superimposed bacterial PNA, prothrombotic levels elevated; CTPE negative; added ASA 81 QD. IS/Acapella/PT to see. Wean supplemental O2 as tolerated to SpO2 > 90%    1/19: improving slowly. Slowly weaning; CCM. Chest physio ordered  1/20: pt still on HFNC, cont current management. Will give Plasma today (2U), cont Decadron (Day 4), Remdisivr (Day 3), Vitamin C, D, Zinc, ASA 81, Lovenox BID, Acapella and IS.   1/21: went from BiPAP to HFNC today. Will cont current management. Will give Plasma today (2U), cont Decadron (Day 5), Remdisivr (Day 4), IV ABx (Day 5), Vitamin C, D, Zinc, ASA 81, Lovenox BID, Acapella and IS.   1/22: pt desaturated to 70's on HFNC, placed back on BiPAP. CT Chest ordered and showing improved aeration however worsening effusions. Will give IV lasix 40 x 1 dose, given Plasma (2U), cont Decadron (Day 6), Remdisivr (Day 5), IV ABx (Day 6), Vitamin C, D, Zinc, ASA 81, Lovenox BID, Acapella and IS.   1/23: pt on HFNC doing well, 70% with 60 L/Min. Responded well to IV lasix, will give another IV 40 x 1 today. Given Plasma (2U), cont Decadron (Day 7), Remdisivr (Day 5), IV ABx (Day 7), Vitamin C, D, Zinc, ASA 81, Lovenox BID, Acapella and IS. 2. Hyponatremia: Na 124 on arrival, no prior to compare; likely hypovolemic d/t inadequate PO intake; pt received 1.0 L NS bolus in ED.  Started on NS at 100 cc/hr; will trend serum Na to reassess response; urine lytes/Osm sent as well.     1/21: Na down to 130 today. NS Infusion stopped yesterday. Will continue to trend Na levels and if declines again will consult Nephrology. 1/22: Na up to 133 today. Cont to trend daily. 3. Essential hypertension: continue home medications, fairly controlled; will monitor. 4. Hx of RCC: s/p R nephrectomy  5. Code status: Full code; palliative care to see  6. PT/OT to see    Chief Complaint: SOB    Initial H and P:-    Admitted for management of Acute hypoxic respiratory failure secondary to COVID 19      Subjective (past 24 hours): No new issues overnight. Denies CP/fever/chills/palpitation/ or GI symptoms. Tolerating PO intake. On HFNC. Pt still having cough and sob. No diarrhea. Past medical history, family history, social history and allergies reviewed again and is unchanged since admission. ROS (12 point review of systems completed. Pertinent positives noted.  Otherwise ROS is negative)     Medications:  Reviewed    Infusion Medications    sodium chloride      sodium chloride       Scheduled Medications    pantoprazole  40 mg Oral QAM AC    amLODIPine  5 mg Oral Daily    [Held by provider] losartan  100 mg Oral Daily    sodium chloride flush  10 mL Intravenous 2 times per day    enoxaparin  40 mg Subcutaneous Q12H    zinc sulfate  50 mg Oral Daily    Vitamin D  1,000 Units Oral Daily    ascorbic acid  500 mg Oral Daily    aspirin  81 mg Oral Daily    cefTRIAXone (ROCEPHIN) IV  1 g Intravenous Q24H    dexamethasone  6 mg Oral Daily     PRN Meds: sodium chloride, sodium chloride, sodium chloride flush, promethazine **OR** ondansetron, polyethylene glycol, acetaminophen **OR** acetaminophen, sodium chloride, potassium chloride **OR** potassium alternative oral replacement **OR** potassium chloride, magnesium sulfate      Intake/Output Summary (Last 24 hours) at 1/23/2021 1153  Last data filed at 1/23/2021 0230  Gross per 24 hour   Intake 120 ml   Output 2075 ml Net -1955 ml       Diet:  DIET GENERAL;  Dietary Nutrition Supplements: Standard High Calorie Oral Supplement    Exam:  /67   Pulse 76   Temp 98.2 °F (36.8 °C) (Oral)   Resp 25   Ht 5' 6\" (1.676 m)   Wt 185 lb 11.2 oz (84.2 kg)   SpO2 98%   BMI 29.97 kg/m²   General appearance: No apparent distress, appears stated age and cooperative on HFNC. HEENT: Pupils equal, round, and reactive to light. Conjunctivae/corneas clear. Neck: Supple, with full range of motion. No jugular venous distention. Trachea midline. Respiratory:  Diminished A/E at lung bases w/ scattered upper airway sounds. Cardiovascular: Regular rate and rhythm with normal S1/S2 without murmurs, rubs or gallops. Abdomen: Soft, non-tender, non-distended with normal bowel sounds. Musculoskeletal: passive and active ROM x 4 extremities. Skin: Skin color, texture, turgor normal.  No rashes or lesions. Neurologic:  Neurovascularly intact without any focal sensory/motor deficits. Cranial nerves: II-XII intact, grossly non-focal.  Psychiatric: Alert and oriented, thought content appropriate, normal insight  Capillary Refill: Brisk,< 3 seconds   Peripheral Pulses: +2 palpable, equal bilaterally     Labs:   Recent Labs     01/21/21  0154 01/22/21  0426 01/23/21  0538   WBC 12.8* 14.0* 14.0*   HGB 11.9* 12.2* 13.0*   HCT 33.9* 35.7* 37.4*    314 362     Recent Labs     01/21/21  0153 01/22/21  0426 01/23/21  0538   * 133* 135   K 3.9 4.5 4.2   CL 98 99 98   CO2 22* 23 26   BUN 15 17 20   CREATININE 0.5 0.6 0.6   CALCIUM 7.4* 7.8* 8.0*     Recent Labs     01/21/21  0153 01/22/21  0426 01/23/21  0538   AST 29 30 25   ALT 16 20 18   BILIDIR <0.2 0.3 0.3   BILITOT 0.5 0.6 0.7   ALKPHOS 79 86 90     No results for input(s): INR in the last 72 hours. No results for input(s): Melani Sriram in the last 72 hours.     Microbiology:    Blood culture #1:   Lab Results   Component Value Date    BC No growth-preliminary No growth 01/17/2021       Blood culture #2:No results found for: BLOODCULT2    Organism:  Lab Results   Component Value Date    ORG Enterobacter cloacae complex 04/03/2018    ORG Candida parapsilosis 04/03/2018    ORG Pseudomonas aeruginosa 04/03/2018         Lab Results   Component Value Date    LABGRAM  04/03/2018     Few segmented neutrophils observed. Rare epithelial cells observed. Rare budding yeast.         MRSA culture only:No results found for: [facility]    Urine culture: No results found for: LABURIN    Respiratory culture: No results found for: CULTRESP    Aerobic and Anaerobic :  Lab Results   Component Value Date    LABAERO  04/03/2018     No growth-preliminary  At least one isolate is resistant in vitro to current  regimen. LABAERO  04/03/2018     very light growth  Enterobacter species which may be initially susceptible to  third generation cephalosporins may develop resistance within  three to four days of initiation of this antimicrobial  therapy. LABAERO light growth 04/03/2018    LABAERO very light growth 04/03/2018     Lab Results   Component Value Date    LABANAE  04/03/2018     No anaerobes isolated- preliminary  No anaerobes isolated         Urinalysis:    No results found for: Claude Krabbe, BACTERIA, RBCUA, BLOODU, SPECGRAV, GLUCOSEU    Radiology:  CT CHEST WO CONTRAST   Final Result   1. Stable left lung infiltrates   2. Slight improved aeration of the right lower lobe. 3. Slight increase in size of bilateral small pleural effusions. **This report has been created using voice recognition software. It may contain minor errors which are inherent in voice recognition technology. **      Final report electronically signed by Dr. Carlos Gant on 1/22/2021 11:37 AM      XR CHEST 1 VIEW   Final Result   Worsening airspace infiltrates worsening appearance of the chest.            **This report has been created using voice recognition software.   It may contain minor errors which are inherent in voice recognition technology. **      Final report electronically signed by Dr. Rita Ivy on 1/20/2021 10:21 AM      CTA CHEST W WO CONTRAST   Final Result   Impression:   1. No pulmonary emboli. 2.  No thoracic aortic aneurysm or dissection   3. Bilateral perihilar and peripheral airspace disease which may    represent pulmonary edema versus atypical viral pneumonia. 4.  Small bilateral pleural fluid collections   5. Multiple hepatic hypodensities are indeterminate and may represent    cysts, hemangiomas or metastatic disease. Nonemergent multiphase imaging    of the entire liver can be performed for further evaluation      This document has been electronically signed by: Radha Regalado MD on    01/17/2021 09:04 PM      All CT scans at this facility use dose modulation, iterative    reconstruction, and/or weight-based   dosing when appropriate to reduce radiation dose to as low as reasonably    achievable. XR CHEST PORTABLE   Final Result   Impression:   1. Perihilar opacities may represent pulmonary edema versus atypical    pneumonia. This document has been electronically signed by: Radha Regalado MD on    01/17/2021 09:03 PM           Cta Chest W Wo Contrast    Result Date: 1/17/2021  Exam:  CTA chest with IV contrast. Procedure: MIP reconstructions are performed Comparison: Chest x-ray 1/17/2021 Findings: No pulmonary emboli. No thoracic aortic aneurysm or dissection. No hilar or mediastinal adenopathy. No pericardial fluid collection. No pneumothorax or pneumomediastinum. Bilateral perihilar and peripheral airspace disease may represent pulmonary edema versus atypical viral pneumonia. Small bilateral pleural fluid collections. Small hiatal hernia. Calcifications in the spleen and right hilar lymph nodes, consistent with previous granulomatous disease. Multiple hepatic hypodensities are indeterminate and may represent cysts, hemangiomas or metastatic disease.  No thoracic compression fracture. Impression: 1. No pulmonary emboli. 2.  No thoracic aortic aneurysm or dissection 3. Bilateral perihilar and peripheral airspace disease which may represent pulmonary edema versus atypical viral pneumonia. 4.  Small bilateral pleural fluid collections 5. Multiple hepatic hypodensities are indeterminate and may represent cysts, hemangiomas or metastatic disease. Nonemergent multiphase imaging of the entire liver can be performed for further evaluation This document has been electronically signed by: Gladys Cranker, MD on 01/17/2021 09:04 PM All CT scans at this facility use dose modulation, iterative reconstruction, and/or weight-based dosing when appropriate to reduce radiation dose to as low as reasonably achievable. Xr Chest Portable    Result Date: 1/17/2021  Exam: One View of the chest Comparison: 4/4/2018 Findings: New perihilar opacities may represent pulmonary edema versus atypical pneumonia. Cardiac silhouette is not enlarged. No pneumothorax. No pleural fluid collection. Old left-sided rib fractures     Impression: 1. Perihilar opacities may represent pulmonary edema versus atypical pneumonia. This document has been electronically signed by: Gladys Cranker, MD on 01/17/2021 09:03 PM     With RN in room, patient was updated about and agreed upon the treatment plan, all the questions and concerns were addressed. Patient was seen in PPE (PERSONAL PROTECTIVE EQUIPMENT). Patient was interviewed in Strict Airborne and Droplet Precautions.     Electronically signed by Cami Sherman MD on 1/23/2021 at 11:53 AM

## 2021-01-24 LAB
ANION GAP SERPL CALCULATED.3IONS-SCNC: 7 MEQ/L (ref 8–16)
BASOPHILS # BLD: 0.1 %
BASOPHILS ABSOLUTE: 0 THOU/MM3 (ref 0–0.1)
BUN BLDV-MCNC: 21 MG/DL (ref 7–22)
CALCIUM SERPL-MCNC: 8 MG/DL (ref 8.5–10.5)
CHLORIDE BLD-SCNC: 98 MEQ/L (ref 98–111)
CO2: 25 MEQ/L (ref 23–33)
CREAT SERPL-MCNC: 0.6 MG/DL (ref 0.4–1.2)
EOSINOPHIL # BLD: 0 %
EOSINOPHILS ABSOLUTE: 0 THOU/MM3 (ref 0–0.4)
ERYTHROCYTE [DISTWIDTH] IN BLOOD BY AUTOMATED COUNT: 12.7 % (ref 11.5–14.5)
ERYTHROCYTE [DISTWIDTH] IN BLOOD BY AUTOMATED COUNT: 45.3 FL (ref 35–45)
GFR SERPL CREATININE-BSD FRML MDRD: > 90 ML/MIN/1.73M2
GLUCOSE BLD-MCNC: 119 MG/DL (ref 70–108)
HCT VFR BLD CALC: 36.9 % (ref 42–52)
HEMOGLOBIN: 12.6 GM/DL (ref 14–18)
IMMATURE GRANS (ABS): 0.13 THOU/MM3 (ref 0–0.07)
IMMATURE GRANULOCYTES: 1.1 %
LYMPHOCYTES # BLD: 6.8 %
LYMPHOCYTES ABSOLUTE: 0.8 THOU/MM3 (ref 1–4.8)
MAGNESIUM: 2.2 MG/DL (ref 1.6–2.4)
MCH RBC QN AUTO: 32.9 PG (ref 26–33)
MCHC RBC AUTO-ENTMCNC: 34.1 GM/DL (ref 32.2–35.5)
MCV RBC AUTO: 96.3 FL (ref 80–94)
MONOCYTES # BLD: 3.9 %
MONOCYTES ABSOLUTE: 0.5 THOU/MM3 (ref 0.4–1.3)
NUCLEATED RED BLOOD CELLS: 0 /100 WBC
PLATELET # BLD: 331 THOU/MM3 (ref 130–400)
PMV BLD AUTO: 9 FL (ref 9.4–12.4)
POTASSIUM SERPL-SCNC: 4 MEQ/L (ref 3.5–5.2)
RBC # BLD: 3.83 MILL/MM3 (ref 4.7–6.1)
SEG NEUTROPHILS: 88.1 %
SEGMENTED NEUTROPHILS ABSOLUTE COUNT: 10.3 THOU/MM3 (ref 1.8–7.7)
SODIUM BLD-SCNC: 130 MEQ/L (ref 135–145)
WBC # BLD: 11.7 THOU/MM3 (ref 4.8–10.8)

## 2021-01-24 PROCEDURE — 94761 N-INVAS EAR/PLS OXIMETRY MLT: CPT

## 2021-01-24 PROCEDURE — 2580000003 HC RX 258: Performed by: PHYSICIAN ASSISTANT

## 2021-01-24 PROCEDURE — 99232 SBSQ HOSP IP/OBS MODERATE 35: CPT | Performed by: INTERNAL MEDICINE

## 2021-01-24 PROCEDURE — 6360000002 HC RX W HCPCS: Performed by: PHYSICIAN ASSISTANT

## 2021-01-24 PROCEDURE — 94669 MECHANICAL CHEST WALL OSCILL: CPT

## 2021-01-24 PROCEDURE — 36415 COLL VENOUS BLD VENIPUNCTURE: CPT

## 2021-01-24 PROCEDURE — 2060000000 HC ICU INTERMEDIATE R&B

## 2021-01-24 PROCEDURE — 94660 CPAP INITIATION&MGMT: CPT

## 2021-01-24 PROCEDURE — 6370000000 HC RX 637 (ALT 250 FOR IP): Performed by: PHYSICIAN ASSISTANT

## 2021-01-24 PROCEDURE — 85025 COMPLETE CBC W/AUTO DIFF WBC: CPT

## 2021-01-24 PROCEDURE — 6370000000 HC RX 637 (ALT 250 FOR IP): Performed by: HOSPITALIST

## 2021-01-24 PROCEDURE — 80048 BASIC METABOLIC PNL TOTAL CA: CPT

## 2021-01-24 PROCEDURE — 2700000000 HC OXYGEN THERAPY PER DAY

## 2021-01-24 PROCEDURE — 83735 ASSAY OF MAGNESIUM: CPT

## 2021-01-24 RX ADMIN — ENOXAPARIN SODIUM 40 MG: 40 INJECTION SUBCUTANEOUS at 12:11

## 2021-01-24 RX ADMIN — ASPIRIN 81 MG: 81 TABLET, CHEWABLE ORAL at 09:04

## 2021-01-24 RX ADMIN — ENOXAPARIN SODIUM 40 MG: 40 INJECTION SUBCUTANEOUS at 23:13

## 2021-01-24 RX ADMIN — SODIUM CHLORIDE, PRESERVATIVE FREE 10 ML: 5 INJECTION INTRAVENOUS at 09:05

## 2021-01-24 RX ADMIN — PANTOPRAZOLE SODIUM 40 MG: 40 TABLET, DELAYED RELEASE ORAL at 09:05

## 2021-01-24 RX ADMIN — DEXAMETHASONE 6 MG: 4 TABLET ORAL at 08:55

## 2021-01-24 RX ADMIN — SODIUM CHLORIDE, PRESERVATIVE FREE 10 ML: 5 INJECTION INTRAVENOUS at 19:48

## 2021-01-24 RX ADMIN — OXYCODONE HYDROCHLORIDE AND ACETAMINOPHEN 500 MG: 500 TABLET ORAL at 09:04

## 2021-01-24 RX ADMIN — Medication 50 MG: at 08:55

## 2021-01-24 RX ADMIN — AMLODIPINE BESYLATE 5 MG: 5 TABLET ORAL at 09:04

## 2021-01-24 RX ADMIN — Medication 1000 UNITS: at 09:04

## 2021-01-24 ASSESSMENT — PAIN SCALES - GENERAL
PAINLEVEL_OUTOF10: 0

## 2021-01-24 NOTE — PROGRESS NOTES
Hospitalist Progress Note      Patient:  Yuri Underwood    Unit/Bed:4B-05/005-A  YOB: 1940  MRN: [id number]   Acct: [de-identified]   PCP: Zeus Strong MD  Date of Admission: 1/17/2021    Assessment/Plan:    1. Acute hypoxic respiratory failure due to Covid-19 PNA: on HFO fiO2 95% at 60 lpm w/ SaO2 90%. On Dexamethasone (added PPI), Remdesivir, zinc, vitamin C, vitamin D, aspirin, LMWH BID dosing, s/p convalescent plasma; procal elevated; on Azirtho/ceftriaxone for possible superimposed bacterial PNA, prothrombotic levels elevated; CTPE negative; added ASA 81 QD. IS/Acapella/PT to see. Wean supplemental O2 as tolerated to SpO2 > 90%    1/19: improving slowly. Slowly weaning; CCM. Chest physio ordered  1/20: pt still on HFNC, cont current management. Will give Plasma today (2U), cont Decadron (Day 4), Remdisivr (Day 3), Vitamin C, D, Zinc, ASA 81, Lovenox BID, Acapella and IS.   1/21: went from BiPAP to HFNC today. Will cont current management. Will give Plasma today (2U), cont Decadron (Day 5), Remdisivr (Day 4), IV ABx (Day 5), Vitamin C, D, Zinc, ASA 81, Lovenox BID, Acapella and IS.   1/22: pt desaturated to 70's on HFNC, placed back on BiPAP. CT Chest ordered and showing improved aeration however worsening effusions. Will give IV lasix 40 x 1 dose, given Plasma (2U), cont Decadron (Day 6), Remdisivr (Day 5), IV ABx (Day 6), Vitamin C, D, Zinc, ASA 81, Lovenox BID, Acapella and IS.   1/23: pt on HFNC doing well, 70% with 60 L/Min. Responded well to IV lasix, will give another IV 40 x 1 today. Given Plasma (2U), cont Decadron (Day 7), Remdisivr (Day 5), IV ABx (Day 7), Vitamin C, D, Zinc, ASA 81, Lovenox BID, Acapella and IS.   1/24: pt on HFNC doing well, 50% with 60 L/Min. Given two doses of IV lasix 40 and responded well.  Given Plasma (2U), cont Decadron (Day 8), Remdisivr (Day 5), IV ABx (Day 7), Vitamin C, D, Zinc, ASA 81, Lovenox BID, Acapella and IS. 2. Hyponatremia: Na 124 on arrival, no prior to compare; likely hypovolemic d/t inadequate PO intake; pt received 1.0 L NS bolus in ED. Started on NS at 100 cc/hr; will trend serum Na to reassess response; urine lytes/Osm sent as well.     1/21: Na down to 130 today. NS Infusion stopped yesterday. Will continue to trend Na levels and if declines again will consult Nephrology. 1/22: Na up to 133 today. Cont to trend daily. 3. Essential hypertension: continue home medications, fairly controlled; will monitor. 4. Hx of RCC: s/p R nephrectomy  5. Code status: Full code; palliative care to see  6. PT/OT to see      Dispo: improving, weaning down on HFNC appropriately. Chief Complaint: SOB    Initial H and P:-    Admitted for management of Acute hypoxic respiratory failure secondary to COVID 19      Subjective (past 24 hours): No new issues overnight. Denies CP/fever/chills/palpitation/ or GI symptoms. Tolerating PO intake. On HFNC. Still coughing and sob however improving. Past medical history, family history, social history and allergies reviewed again and is unchanged since admission. ROS (12 point review of systems completed. Pertinent positives noted.  Otherwise ROS is negative)     Medications:  Reviewed    Infusion Medications    sodium chloride      sodium chloride       Scheduled Medications    pantoprazole  40 mg Oral QAM AC    amLODIPine  5 mg Oral Daily    [Held by provider] losartan  100 mg Oral Daily    sodium chloride flush  10 mL Intravenous 2 times per day    enoxaparin  40 mg Subcutaneous Q12H    zinc sulfate  50 mg Oral Daily    Vitamin D  1,000 Units Oral Daily    ascorbic acid  500 mg Oral Daily    aspirin  81 mg Oral Daily    dexamethasone  6 mg Oral Daily     PRN Meds: sodium chloride, sodium chloride, sodium chloride flush, promethazine **OR** ondansetron, polyethylene glycol, acetaminophen **OR** acetaminophen, sodium chloride, potassium chloride **OR** potassium alternative oral replacement **OR** potassium chloride, magnesium sulfate      Intake/Output Summary (Last 24 hours) at 1/24/2021 1048  Last data filed at 1/24/2021 6802  Gross per 24 hour   Intake 570 ml   Output 1350 ml   Net -780 ml       Diet:  DIET GENERAL;  Dietary Nutrition Supplements: Standard High Calorie Oral Supplement    Exam:  BP (!) 126/58   Pulse (!) 47   Temp 97.9 °F (36.6 °C) (Axillary)   Resp 25   Ht 5' 6\" (1.676 m)   Wt 178 lb 4.8 oz (80.9 kg)   SpO2 96%   BMI 28.78 kg/m²   General appearance: No apparent distress, appears stated age and cooperative on HFNC. HEENT: Pupils equal, round, and reactive to light. Conjunctivae/corneas clear. Neck: Supple, with full range of motion. No jugular venous distention. Trachea midline. Respiratory:  Diminished A/E at lung bases w/ scattered upper airway sounds. Cardiovascular: Regular rate and rhythm with normal S1/S2 without murmurs, rubs or gallops. Abdomen: Soft, non-tender, non-distended with normal bowel sounds. Musculoskeletal: passive and active ROM x 4 extremities. Skin: Skin color, texture, turgor normal.  No rashes or lesions. Neurologic:  Neurovascularly intact without any focal sensory/motor deficits.  Cranial nerves: II-XII intact, grossly non-focal.  Psychiatric: Alert and oriented, thought content appropriate, normal insight  Capillary Refill: Brisk,< 3 seconds   Peripheral Pulses: +2 palpable, equal bilaterally     Labs:   Recent Labs     01/22/21  0426 01/23/21  0538 01/24/21  0610   WBC 14.0* 14.0* 11.7*   HGB 12.2* 13.0* 12.6*   HCT 35.7* 37.4* 36.9*    362 331     Recent Labs     01/22/21  0426 01/23/21  0538 01/24/21  0610   * 135 130*   K 4.5 4.2 4.0   CL 99 98 98   CO2 23 26 25   BUN 17 20 21   CREATININE 0.6 0.6 0.6   CALCIUM 7.8* 8.0* 8.0*     Recent Labs     01/22/21  0426 01/23/21  0538   AST 30 25   ALT 20 18   BILIDIR 0.3 0.3   BILITOT 0.6 0.7   ALKPHOS 86 90     No results for input(s): INR in the last 72 hours. No results for input(s): Shira Ripper in the last 72 hours. Microbiology:    Blood culture #1:   Lab Results   Component Value Date    BC No growth-preliminary No growth  01/17/2021       Blood culture #2:No results found for: Mary Guadalupe    Organism:  Lab Results   Component Value Date    ORG Enterobacter cloacae complex 04/03/2018    ORG Candida parapsilosis 04/03/2018    ORG Pseudomonas aeruginosa 04/03/2018         Lab Results   Component Value Date    LABGRAM  04/03/2018     Few segmented neutrophils observed. Rare epithelial cells observed. Rare budding yeast.         MRSA culture only:No results found for: [facility]    Urine culture: No results found for: LABURIN    Respiratory culture: No results found for: CULTRESP    Aerobic and Anaerobic :  Lab Results   Component Value Date    LABAERO  04/03/2018     No growth-preliminary  At least one isolate is resistant in vitro to current  regimen. LABAERO  04/03/2018     very light growth  Enterobacter species which may be initially susceptible to  third generation cephalosporins may develop resistance within  three to four days of initiation of this antimicrobial  therapy. LABAERO light growth 04/03/2018    LABAERO very light growth 04/03/2018     Lab Results   Component Value Date    LABANAE  04/03/2018     No anaerobes isolated- preliminary  No anaerobes isolated         Urinalysis:    No results found for: Nina Rios, BACTERIA, RBCUA, BLOODU, SPECGRAV, GLUCOSEU    Radiology:  CT CHEST WO CONTRAST   Final Result   1. Stable left lung infiltrates   2. Slight improved aeration of the right lower lobe. 3. Slight increase in size of bilateral small pleural effusions. **This report has been created using voice recognition software. It may contain minor errors which are inherent in voice recognition technology. **      Final report electronically signed by Dr. Reginaldo Nugent on 1/22/2021 11:37 AM      XR CHEST 1 VIEW   Final Result   Worsening airspace infiltrates worsening appearance of the chest.            **This report has been created using voice recognition software. It may contain minor errors which are inherent in voice recognition technology. **      Final report electronically signed by Dr. Hardeep Gregory on 1/20/2021 10:21 AM      CTA CHEST W WO CONTRAST   Final Result   Impression:   1. No pulmonary emboli. 2.  No thoracic aortic aneurysm or dissection   3. Bilateral perihilar and peripheral airspace disease which may    represent pulmonary edema versus atypical viral pneumonia. 4.  Small bilateral pleural fluid collections   5. Multiple hepatic hypodensities are indeterminate and may represent    cysts, hemangiomas or metastatic disease. Nonemergent multiphase imaging    of the entire liver can be performed for further evaluation      This document has been electronically signed by: Kennedy Carey MD on    01/17/2021 09:04 PM      All CT scans at this facility use dose modulation, iterative    reconstruction, and/or weight-based   dosing when appropriate to reduce radiation dose to as low as reasonably    achievable. XR CHEST PORTABLE   Final Result   Impression:   1. Perihilar opacities may represent pulmonary edema versus atypical    pneumonia. This document has been electronically signed by: Kennedy Carey MD on    01/17/2021 09:03 PM           Cta Chest W Wo Contrast    Result Date: 1/17/2021  Exam:  CTA chest with IV contrast. Procedure: MIP reconstructions are performed Comparison: Chest x-ray 1/17/2021 Findings: No pulmonary emboli. No thoracic aortic aneurysm or dissection. No hilar or mediastinal adenopathy. No pericardial fluid collection. No pneumothorax or pneumomediastinum. Bilateral perihilar and peripheral airspace disease may represent pulmonary edema versus atypical viral pneumonia. Small bilateral pleural fluid collections. Small hiatal hernia.  Calcifications in the spleen and right hilar lymph nodes, consistent with previous granulomatous disease. Multiple hepatic hypodensities are indeterminate and may represent cysts, hemangiomas or metastatic disease. No thoracic compression fracture. Impression: 1. No pulmonary emboli. 2.  No thoracic aortic aneurysm or dissection 3. Bilateral perihilar and peripheral airspace disease which may represent pulmonary edema versus atypical viral pneumonia. 4.  Small bilateral pleural fluid collections 5. Multiple hepatic hypodensities are indeterminate and may represent cysts, hemangiomas or metastatic disease. Nonemergent multiphase imaging of the entire liver can be performed for further evaluation This document has been electronically signed by: Nataliya Johns MD on 01/17/2021 09:04 PM All CT scans at this facility use dose modulation, iterative reconstruction, and/or weight-based dosing when appropriate to reduce radiation dose to as low as reasonably achievable. Xr Chest Portable    Result Date: 1/17/2021  Exam: One View of the chest Comparison: 4/4/2018 Findings: New perihilar opacities may represent pulmonary edema versus atypical pneumonia. Cardiac silhouette is not enlarged. No pneumothorax. No pleural fluid collection. Old left-sided rib fractures     Impression: 1. Perihilar opacities may represent pulmonary edema versus atypical pneumonia. This document has been electronically signed by: Nataliya Johns MD on 01/17/2021 09:03 PM     With RN in room, patient was updated about and agreed upon the treatment plan, all the questions and concerns were addressed. Patient was seen in PPE (PERSONAL PROTECTIVE EQUIPMENT). Patient was interviewed in Strict Airborne and Droplet Precautions.     Electronically signed by eRnny Colbert MD on 1/24/2021 at 10:48 AM

## 2021-01-25 ENCOUNTER — APPOINTMENT (OUTPATIENT)
Dept: GENERAL RADIOLOGY | Age: 81
DRG: 177 | End: 2021-01-25
Payer: MEDICARE

## 2021-01-25 LAB
ANION GAP SERPL CALCULATED.3IONS-SCNC: 9 MEQ/L (ref 8–16)
BASOPHILS # BLD: 0.1 %
BASOPHILS ABSOLUTE: 0 THOU/MM3 (ref 0–0.1)
BUN BLDV-MCNC: 22 MG/DL (ref 7–22)
CALCIUM SERPL-MCNC: 8.1 MG/DL (ref 8.5–10.5)
CHLORIDE BLD-SCNC: 99 MEQ/L (ref 98–111)
CO2: 26 MEQ/L (ref 23–33)
CREAT SERPL-MCNC: 0.6 MG/DL (ref 0.4–1.2)
EOSINOPHIL # BLD: 0 %
EOSINOPHILS ABSOLUTE: 0 THOU/MM3 (ref 0–0.4)
ERYTHROCYTE [DISTWIDTH] IN BLOOD BY AUTOMATED COUNT: 12.8 % (ref 11.5–14.5)
ERYTHROCYTE [DISTWIDTH] IN BLOOD BY AUTOMATED COUNT: 45.5 FL (ref 35–45)
GFR SERPL CREATININE-BSD FRML MDRD: > 90 ML/MIN/1.73M2
GLUCOSE BLD-MCNC: 94 MG/DL (ref 70–108)
HCT VFR BLD CALC: 38 % (ref 42–52)
HEMOGLOBIN: 13.1 GM/DL (ref 14–18)
IMMATURE GRANS (ABS): 0.12 THOU/MM3 (ref 0–0.07)
IMMATURE GRANULOCYTES: 1 %
LYMPHOCYTES # BLD: 8.6 %
LYMPHOCYTES ABSOLUTE: 1.1 THOU/MM3 (ref 1–4.8)
MAGNESIUM: 2.3 MG/DL (ref 1.6–2.4)
MCH RBC QN AUTO: 33.6 PG (ref 26–33)
MCHC RBC AUTO-ENTMCNC: 34.5 GM/DL (ref 32.2–35.5)
MCV RBC AUTO: 97.4 FL (ref 80–94)
MONOCYTES # BLD: 5.9 %
MONOCYTES ABSOLUTE: 0.7 THOU/MM3 (ref 0.4–1.3)
NUCLEATED RED BLOOD CELLS: 0 /100 WBC
PLATELET # BLD: 362 THOU/MM3 (ref 130–400)
PMV BLD AUTO: 9.1 FL (ref 9.4–12.4)
POTASSIUM SERPL-SCNC: 4.3 MEQ/L (ref 3.5–5.2)
RBC # BLD: 3.9 MILL/MM3 (ref 4.7–6.1)
SEG NEUTROPHILS: 84.4 %
SEGMENTED NEUTROPHILS ABSOLUTE COUNT: 10.6 THOU/MM3 (ref 1.8–7.7)
SODIUM BLD-SCNC: 134 MEQ/L (ref 135–145)
WBC # BLD: 12.5 THOU/MM3 (ref 4.8–10.8)

## 2021-01-25 PROCEDURE — 80048 BASIC METABOLIC PNL TOTAL CA: CPT

## 2021-01-25 PROCEDURE — 2060000000 HC ICU INTERMEDIATE R&B

## 2021-01-25 PROCEDURE — 83735 ASSAY OF MAGNESIUM: CPT

## 2021-01-25 PROCEDURE — 6360000002 HC RX W HCPCS: Performed by: PHYSICIAN ASSISTANT

## 2021-01-25 PROCEDURE — 2580000003 HC RX 258: Performed by: PHYSICIAN ASSISTANT

## 2021-01-25 PROCEDURE — 99233 SBSQ HOSP IP/OBS HIGH 50: CPT | Performed by: INTERNAL MEDICINE

## 2021-01-25 PROCEDURE — 71045 X-RAY EXAM CHEST 1 VIEW: CPT

## 2021-01-25 PROCEDURE — 94669 MECHANICAL CHEST WALL OSCILL: CPT

## 2021-01-25 PROCEDURE — 6370000000 HC RX 637 (ALT 250 FOR IP): Performed by: PHYSICIAN ASSISTANT

## 2021-01-25 PROCEDURE — 85025 COMPLETE CBC W/AUTO DIFF WBC: CPT

## 2021-01-25 PROCEDURE — 94761 N-INVAS EAR/PLS OXIMETRY MLT: CPT

## 2021-01-25 PROCEDURE — 2700000000 HC OXYGEN THERAPY PER DAY

## 2021-01-25 PROCEDURE — 36415 COLL VENOUS BLD VENIPUNCTURE: CPT

## 2021-01-25 PROCEDURE — 97110 THERAPEUTIC EXERCISES: CPT

## 2021-01-25 RX ADMIN — Medication 1000 UNITS: at 09:27

## 2021-01-25 RX ADMIN — ASPIRIN 81 MG: 81 TABLET, CHEWABLE ORAL at 09:28

## 2021-01-25 RX ADMIN — SODIUM CHLORIDE, PRESERVATIVE FREE 10 ML: 5 INJECTION INTRAVENOUS at 21:37

## 2021-01-25 RX ADMIN — OXYCODONE HYDROCHLORIDE AND ACETAMINOPHEN 500 MG: 500 TABLET ORAL at 09:28

## 2021-01-25 RX ADMIN — ENOXAPARIN SODIUM 40 MG: 40 INJECTION SUBCUTANEOUS at 12:40

## 2021-01-25 RX ADMIN — AMLODIPINE BESYLATE 5 MG: 5 TABLET ORAL at 09:27

## 2021-01-25 RX ADMIN — SODIUM CHLORIDE, PRESERVATIVE FREE 10 ML: 5 INJECTION INTRAVENOUS at 09:27

## 2021-01-25 RX ADMIN — DEXAMETHASONE 6 MG: 4 TABLET ORAL at 09:28

## 2021-01-25 RX ADMIN — Medication 50 MG: at 09:28

## 2021-01-25 RX ADMIN — ENOXAPARIN SODIUM 40 MG: 40 INJECTION SUBCUTANEOUS at 23:22

## 2021-01-25 ASSESSMENT — PAIN SCALES - GENERAL: PAINLEVEL_OUTOF10: 0

## 2021-01-25 NOTE — PROGRESS NOTES
[address] [address]  INPATIENT PHYSICAL THERAPY  DAILY NOTE  [facility] 4B - 4B-05/005-A    Time In: 1010  Time Out: 1894  Timed Code Treatment Minutes: 29 Minutes  Minutes: 29          Date: 2021  Patient Name: Kerline Blackwell,  Gender:  male        MRN: [id number]  : 1940  (79 y.o.)     Referring Practitioner: Urvashi Brower MD  Diagnosis: Acute hypoxemic respiratory failure due to COVID-19  Additional Pertinent Hx: Per ED note, pt \"is a 79 y.o. male who presents to the Emergency Department for the evaluation of cough and shortness of breath. Patient reports gradual onset of symptoms over the past 5 days with significant worsening of the shortness of breath over the past 2 days. He reports unproductive cough associated with decreased sense of taste and smell and some nasal congestion over the past week. Denies any known sick contacts and has not been tested for Covid since symptom onset. He reports worsening of shortness of breath with exertion, improvement with rest.  Reports generalized weakness that occurs with activity. He has been taking Tylenol and was also started on vitamin C, vitamin D, zinc by his daughter earlier in the season. He denies any pulmonary history. He is not a smoker. He denies any associated fevers, chills, generalized body aches, headache, sore throat, vomiting, diarrhea, wheezing, edema, orthopnea or dizziness. \"     Prior Level of Function:  Lives With: Spouse  Type of Home: House  Home Layout: One level  Home Access: Stairs to enter with rails  Entrance Stairs - Number of Steps: 3-4 steps with 1 rail  Home Equipment: (none)   Bathroom Shower/Tub: Walk-in shower  Bathroom Toilet: Standard  Bathroom Equipment: Grab bars in shower  Bathroom Accessibility: Accessible    Receives Help From: Family  ADL Assistance: [address] [place] [address] Avenue: Independent  Homemaking Responsibilities: Yes  Ambulation Assistance: Independent  Transfer Assistance: Independent  Active : Yes  Additional Comments: Pt reports being Independent with all mobility in PLOF. Pt shared homemaking with his spouse prior to admission. Pt's spouse is also hospitalized with COVID-19 virus at this time. Restrictions/Precautions:  Restrictions/Precautions: General Precautions, Fall Risk, Isolation  Position Activity Restriction  Other position/activity restrictions: COVID+; 1/18 on BiPAP with settings 65%, PEEP 12; 1/19 on BiPAP and at 65%, PEEP 10; 1/20 high flow; 1/22; HFNC at 55 L and 80%     SUBJECTIVE: RN approved session. Pt in recliner upon arrival and agrees to therapy. Pt on HFNC @ 60LPM and 50% O2  SpO2 ranged from 88-96%, majority of session pt 90-93%  Did not have pt stand up due to low O2 sat after therex (90%) anticipate that O2 would drop below 88%    PAIN: no c/o pain    OBJECTIVE:  Bed Mobility:  Not Tested    Transfers:  Not Tested    Ambulation:  Not Tested    Exercise:  Patient was guided in 1 set(s) 10 reps of exercise to both lower extremities. Glut sets, Upper trunk rotations, Shoulder horizontal abduction/adduction, Shoulder rolls, Seated marches, Seated hamstring curls, Seated heel/toe raises, Long arc quads, Seated isometric hip adduction and scapular retractions. extra time to complete with rest breaks throughout. Exercises were completed for increased independence with functional mobility. Functional Outcome Measures: Completed  AM-PAC Inpatient Mobility Raw Score : 18  AM-PAC Inpatient T-Scale Score : 43.63    ASSESSMENT:  Assessment: Patient progressing toward established goals. Activity Tolerance:  Patient tolerance of  treatment: fair. Pt limited byhypoxia and fatigue with exercises     Equipment Recommendations: Other: monitor for needs - poss RW at discharge?   Discharge Recommendations:  Continue to assess pending progress    Plan: Times per week: 4-5x GM  Current Treatment Recommendations: Strengthening, Gait Training, Patient/Caregiver Education & Training, Equipment Evaluation, Education, & procurement, Balance Training, Functional Mobility Training, Endurance Training, Transfer Training, Safety Education & Training, Home Exercise Program    Patient Education  Patient Education: Plan of Care, Verbal Exercise Instruction, deep puprsed lip breathing    Goals:  Patient goals : go home  Short term goals  Time Frame for Short term goals: at discharge  Short term goal 1: Pt to be Mod I for supine <> sit to get in/out of bed  Short term goal 2: Pt to be Mod I for sit <> stand to get up to ambulate  Short term goal 3: Pt to ambulate > 60 ft with/without AD with Supervision for household distances  Long term goals  Time Frame for Long term goals : not set due to short ELOS    Following session, patient left in safe position with all fall risk precautions in place.

## 2021-01-25 NOTE — PROGRESS NOTES
TCU consult received. Chart reviewed. Await to see therapy progress off of high flow O2. Will monitor.

## 2021-01-25 NOTE — PROGRESS NOTES
Daughter, peg, called back at this time and updated on pt status. No further questions for this RN at this time.

## 2021-01-25 NOTE — PROGRESS NOTES
Hospitalist Progress Note      Patient:  Noel Nguyen    Unit/Bed:4B-05/005-A  YOB: 1940  MRN: [id number]   Acct: [de-identified]   PCP: Kylee Mccormack MD  Date of Admission: 1/17/2021    Assessment/Plan:    1. Acute hypoxic respiratory failure due to Covid-19 PNA: on HFO fiO2 95% at 60 lpm w/ SaO2 90%. On Dexamethasone (added PPI), Remdesivir, zinc, vitamin C, vitamin D, aspirin, LMWH BID dosing, s/p convalescent plasma; procal elevated; on Azirtho/ceftriaxone for possible superimposed bacterial PNA, prothrombotic levels elevated; CTPE negative; added ASA 81 QD. IS/Acapella/PT to see. Wean supplemental O2 as tolerated to SpO2 > 90%    1/19: improving slowly. Slowly weaning; CCM. Chest physio ordered  1/20: pt still on HFNC, cont current management. Will give Plasma today (2U), cont Decadron (Day 4), Remdisivr (Day 3), Vitamin C, D, Zinc, ASA 81, Lovenox BID, Acapella and IS.   1/21: went from BiPAP to HFNC today. Will cont current management. Will give Plasma today (2U), cont Decadron (Day 5), Remdisivr (Day 4), IV ABx (Day 5), Vitamin C, D, Zinc, ASA 81, Lovenox BID, Acapella and IS.   1/22: pt desaturated to 70's on HFNC, placed back on BiPAP. CT Chest ordered and showing improved aeration however worsening effusions. Will give IV lasix 40 x 1 dose, given Plasma (2U), cont Decadron (Day 6), Remdisivr (Day 5), IV ABx (Day 6), Vitamin C, D, Zinc, ASA 81, Lovenox BID, Acapella and IS.   1/23: pt on HFNC doing well, 70% with 60 L/Min. Responded well to IV lasix, will give another IV 40 x 1 today. Given Plasma (2U), cont Decadron (Day 7), Remdisivr (Day 5), IV ABx (Day 7), Vitamin C, D, Zinc, ASA 81, Lovenox BID, Acapella and IS.   1/24: pt on HFNC doing well, 50% with 60 L/Min. Given two doses of IV lasix 40 and responded well.  Given Plasma (2U), cont Decadron (Day 8), Remdisivr (Day 5), IV ABx (Day 7), Vitamin C, D, Zinc, ASA 81, Lovenox BID, Acapella and IS.   1/25: pt still on HFNC on same settings as yesterday. Given Plasma (2U), cont Decadron (Day 9), completed 5 days of IV Remdisivr and 7 days of IV Abx, Vitamin C, D, Zinc, ASA 81, Lovenox BID, Acapella and IS. 2. Hyponatremia: Na 124 on arrival, no prior to compare; likely hypovolemic d/t inadequate PO intake; pt received 1.0 L NS bolus in ED. Started on NS at 100 cc/hr; will trend serum Na to reassess response; urine lytes/Osm sent as well.     1/21: Na down to 130 today. NS Infusion stopped yesterday. Will continue to trend Na levels and if declines again will consult Nephrology. 1/22: Na up to 133 today. Cont to trend daily. 3. Essential hypertension: continue home medications, fairly controlled; will monitor. 4. Hx of RCC: s/p R nephrectomy  5. Code status: Full code; palliative care to see  6. PT/OT to see        Chief Complaint: SOB    Initial H and P:-    Admitted for management of Acute hypoxic respiratory failure secondary to COVID 19      Subjective (past 24 hours): No new issues overnight. Denies CP/fever/chills/palpitation/ or GI symptoms. Tolerating PO intake. On HFNC. Past medical history, family history, social history and allergies reviewed again and is unchanged since admission. ROS (12 point review of systems completed. Pertinent positives noted.  Otherwise ROS is negative)     Medications:  Reviewed    Infusion Medications    sodium chloride      sodium chloride       Scheduled Medications    pantoprazole  40 mg Oral QAM AC    amLODIPine  5 mg Oral Daily    [Held by provider] losartan  100 mg Oral Daily    sodium chloride flush  10 mL Intravenous 2 times per day    enoxaparin  40 mg Subcutaneous Q12H    zinc sulfate  50 mg Oral Daily    Vitamin D  1,000 Units Oral Daily    ascorbic acid  500 mg Oral Daily    aspirin  81 mg Oral Daily    dexamethasone  6 mg Oral Daily     PRN Meds: sodium chloride, sodium chloride, sodium chloride flush, AST 25   ALT 18   BILIDIR 0.3   BILITOT 0.7   ALKPHOS 90     No results for input(s): INR in the last 72 hours. No results for input(s): Kristie Joseph in the last 72 hours. Microbiology:    Blood culture #1:   Lab Results   Component Value Date    BC No growth-preliminary No growth  01/17/2021       Blood culture #2:No results found for: Yahaira Lozailkatharine    Organism:  Lab Results   Component Value Date    ORG Enterobacter cloacae complex 04/03/2018    ORG Candida parapsilosis 04/03/2018    ORG Pseudomonas aeruginosa 04/03/2018         Lab Results   Component Value Date    LABGRAM  04/03/2018     Few segmented neutrophils observed. Rare epithelial cells observed. Rare budding yeast.         MRSA culture only:No results found for: [facility]    Urine culture: No results found for: LABURIN    Respiratory culture: No results found for: CULTRESP    Aerobic and Anaerobic :  Lab Results   Component Value Date    LABAERO  04/03/2018     No growth-preliminary  At least one isolate is resistant in vitro to current  regimen. LABAERO  04/03/2018     very light growth  Enterobacter species which may be initially susceptible to  third generation cephalosporins may develop resistance within  three to four days of initiation of this antimicrobial  therapy. LABAERO light growth 04/03/2018    LABAERO very light growth 04/03/2018     Lab Results   Component Value Date    LABANAE  04/03/2018     No anaerobes isolated- preliminary  No anaerobes isolated         Urinalysis:    No results found for: [place], BACTERIA, RBCUA, BLOODU, SPECGRAV, GLUCOSEU    Radiology:  CT CHEST WO CONTRAST   Final Result   1. Stable left lung infiltrates   2. Slight improved aeration of the right lower lobe. 3. Slight increase in size of bilateral small pleural effusions. **This report has been created using voice recognition software. It may contain minor errors which are inherent in voice recognition technology. **      Final report electronically signed by Dr. Sherif Zurita on 1/22/2021 11:37 AM      XR CHEST 1 VIEW   Final Result   Worsening airspace infiltrates worsening appearance of the chest.            **This report has been created using voice recognition software. It may contain minor errors which are inherent in voice recognition technology. **      Final report electronically signed by Dr. Sherif Zurita on 1/20/2021 10:21 AM      CTA CHEST W WO CONTRAST   Final Result   Impression:   1. No pulmonary emboli. 2.  No thoracic aortic aneurysm or dissection   3. Bilateral perihilar and peripheral airspace disease which may    represent pulmonary edema versus atypical viral pneumonia. 4.  Small bilateral pleural fluid collections   5. Multiple hepatic hypodensities are indeterminate and may represent    cysts, hemangiomas or metastatic disease. Nonemergent multiphase imaging    of the entire liver can be performed for further evaluation      This document has been electronically signed by: Lindy Knox MD on    01/17/2021 09:04 PM      All CT scans at this facility use dose modulation, iterative    reconstruction, and/or weight-based   dosing when appropriate to reduce radiation dose to as low as reasonably    achievable. XR CHEST PORTABLE   Final Result   Impression:   1. Perihilar opacities may represent pulmonary edema versus atypical    pneumonia. This document has been electronically signed by: Lindy Knox MD on    01/17/2021 09:03 PM           Cta Chest W Wo Contrast    Result Date: 1/17/2021  Exam:  CTA chest with IV contrast. Procedure: MIP reconstructions are performed Comparison: Chest x-ray 1/17/2021 Findings: No pulmonary emboli. No thoracic aortic aneurysm or dissection. No hilar or mediastinal adenopathy. No pericardial fluid collection. No pneumothorax or pneumomediastinum. Bilateral perihilar and peripheral airspace disease may represent pulmonary edema versus atypical viral pneumonia. Small bilateral pleural fluid collections. Small hiatal hernia. Calcifications in the spleen and right hilar lymph nodes, consistent with previous granulomatous disease. Multiple hepatic hypodensities are indeterminate and may represent cysts, hemangiomas or metastatic disease. No thoracic compression fracture. Impression: 1. No pulmonary emboli. 2.  No thoracic aortic aneurysm or dissection 3. Bilateral perihilar and peripheral airspace disease which may represent pulmonary edema versus atypical viral pneumonia. 4.  Small bilateral pleural fluid collections 5. Multiple hepatic hypodensities are indeterminate and may represent cysts, hemangiomas or metastatic disease. Nonemergent multiphase imaging of the entire liver can be performed for further evaluation This document has been electronically signed by: Lei Woo MD on 01/17/2021 09:04 PM All CT scans at this facility use dose modulation, iterative reconstruction, and/or weight-based dosing when appropriate to reduce radiation dose to as low as reasonably achievable. Xr Chest Portable    Result Date: 1/17/2021  Exam: One View of the chest Comparison: 4/4/2018 Findings: New perihilar opacities may represent pulmonary edema versus atypical pneumonia. Cardiac silhouette is not enlarged. No pneumothorax. No pleural fluid collection. Old left-sided rib fractures     Impression: 1. Perihilar opacities may represent pulmonary edema versus atypical pneumonia. This document has been electronically signed by: Lei Woo MD on 01/17/2021 09:03 PM     With RN in room, patient was updated about and agreed upon the treatment plan, all the questions and concerns were addressed. Patient was seen in PPE (PERSONAL PROTECTIVE EQUIPMENT). Patient was interviewed in Strict Airborne and Droplet Precautions.     Electronically signed by Benjamín Gonzalez MD on 1/25/2021 at 8:39 AM

## 2021-01-25 NOTE — CARE COORDINATION
DISASTER CHARTING    1/25/21, 12:01 PM EST    DISCHARGE ONGOING EVALUATION:     Yohannes Valderrama day: 8  Location: [facility] [place]05/005-A Reason for admit: Acute hypoxemic respiratory failure due to COVID-19 ([facility] [address] [U07.1, J96.01]   Barriers to Discharge: Tmax 98.2, high flow oxygen at 50% FiO2 60 liters with saturations at 91%. PT/OT, Vitamin C,D,zinc, Decadron, electrolyte replacement protocols. PCP: Martha Beasley MD  Readmission Risk Score: 14%  Patient Goals/Plan/Treatment Preferences: From home with spouse. TCU consult placed for possible placement. Of note wife will go to TCU possibly 1/26. Will follow.

## 2021-01-26 LAB
ANION GAP SERPL CALCULATED.3IONS-SCNC: 11 MEQ/L (ref 8–16)
BASOPHILS # BLD: 0.1 %
BASOPHILS ABSOLUTE: 0 THOU/MM3 (ref 0–0.1)
BUN BLDV-MCNC: 24 MG/DL (ref 7–22)
CALCIUM SERPL-MCNC: 8.1 MG/DL (ref 8.5–10.5)
CHLORIDE BLD-SCNC: 102 MEQ/L (ref 98–111)
CO2: 23 MEQ/L (ref 23–33)
CREAT SERPL-MCNC: 0.6 MG/DL (ref 0.4–1.2)
EOSINOPHIL # BLD: 0 %
EOSINOPHILS ABSOLUTE: 0 THOU/MM3 (ref 0–0.4)
ERYTHROCYTE [DISTWIDTH] IN BLOOD BY AUTOMATED COUNT: 12.7 % (ref 11.5–14.5)
ERYTHROCYTE [DISTWIDTH] IN BLOOD BY AUTOMATED COUNT: 46.2 FL (ref 35–45)
GFR SERPL CREATININE-BSD FRML MDRD: > 90 ML/MIN/1.73M2
GLUCOSE BLD-MCNC: 107 MG/DL (ref 70–108)
HCT VFR BLD CALC: 36.9 % (ref 42–52)
HEMOGLOBIN: 12.4 GM/DL (ref 14–18)
IMMATURE GRANS (ABS): 0.08 THOU/MM3 (ref 0–0.07)
IMMATURE GRANULOCYTES: 0.9 %
LYMPHOCYTES # BLD: 8.4 %
LYMPHOCYTES ABSOLUTE: 0.8 THOU/MM3 (ref 1–4.8)
MAGNESIUM: 2.3 MG/DL (ref 1.6–2.4)
MCH RBC QN AUTO: 33.4 PG (ref 26–33)
MCHC RBC AUTO-ENTMCNC: 33.6 GM/DL (ref 32.2–35.5)
MCV RBC AUTO: 99.5 FL (ref 80–94)
MONOCYTES # BLD: 6 %
MONOCYTES ABSOLUTE: 0.6 THOU/MM3 (ref 0.4–1.3)
NUCLEATED RED BLOOD CELLS: 0 /100 WBC
PLATELET # BLD: 323 THOU/MM3 (ref 130–400)
PMV BLD AUTO: 9 FL (ref 9.4–12.4)
POTASSIUM SERPL-SCNC: 4.1 MEQ/L (ref 3.5–5.2)
RBC # BLD: 3.71 MILL/MM3 (ref 4.7–6.1)
SEG NEUTROPHILS: 84.6 %
SEGMENTED NEUTROPHILS ABSOLUTE COUNT: 7.9 THOU/MM3 (ref 1.8–7.7)
SODIUM BLD-SCNC: 136 MEQ/L (ref 135–145)
WBC # BLD: 9.3 THOU/MM3 (ref 4.8–10.8)

## 2021-01-26 PROCEDURE — 2060000000 HC ICU INTERMEDIATE R&B

## 2021-01-26 PROCEDURE — 6370000000 HC RX 637 (ALT 250 FOR IP): Performed by: HOSPITALIST

## 2021-01-26 PROCEDURE — 80048 BASIC METABOLIC PNL TOTAL CA: CPT

## 2021-01-26 PROCEDURE — 97530 THERAPEUTIC ACTIVITIES: CPT

## 2021-01-26 PROCEDURE — 94669 MECHANICAL CHEST WALL OSCILL: CPT

## 2021-01-26 PROCEDURE — 36415 COLL VENOUS BLD VENIPUNCTURE: CPT

## 2021-01-26 PROCEDURE — 2580000003 HC RX 258: Performed by: PHYSICIAN ASSISTANT

## 2021-01-26 PROCEDURE — 97110 THERAPEUTIC EXERCISES: CPT

## 2021-01-26 PROCEDURE — 99232 SBSQ HOSP IP/OBS MODERATE 35: CPT | Performed by: INTERNAL MEDICINE

## 2021-01-26 PROCEDURE — 6370000000 HC RX 637 (ALT 250 FOR IP): Performed by: PHYSICIAN ASSISTANT

## 2021-01-26 PROCEDURE — 83735 ASSAY OF MAGNESIUM: CPT

## 2021-01-26 PROCEDURE — 6360000002 HC RX W HCPCS: Performed by: PHYSICIAN ASSISTANT

## 2021-01-26 PROCEDURE — 94761 N-INVAS EAR/PLS OXIMETRY MLT: CPT

## 2021-01-26 PROCEDURE — 2700000000 HC OXYGEN THERAPY PER DAY

## 2021-01-26 PROCEDURE — 85025 COMPLETE CBC W/AUTO DIFF WBC: CPT

## 2021-01-26 PROCEDURE — 6360000002 HC RX W HCPCS: Performed by: INTERNAL MEDICINE

## 2021-01-26 RX ORDER — DEXAMETHASONE 4 MG/1
6 TABLET ORAL DAILY
Status: DISCONTINUED | OUTPATIENT
Start: 2021-01-26 | End: 2021-01-28

## 2021-01-26 RX ADMIN — ENOXAPARIN SODIUM 40 MG: 40 INJECTION SUBCUTANEOUS at 11:40

## 2021-01-26 RX ADMIN — ASPIRIN 81 MG: 81 TABLET, CHEWABLE ORAL at 11:40

## 2021-01-26 RX ADMIN — PANTOPRAZOLE SODIUM 40 MG: 40 TABLET, DELAYED RELEASE ORAL at 06:28

## 2021-01-26 RX ADMIN — SODIUM CHLORIDE, PRESERVATIVE FREE 10 ML: 5 INJECTION INTRAVENOUS at 21:00

## 2021-01-26 RX ADMIN — Medication 1000 UNITS: at 11:41

## 2021-01-26 RX ADMIN — OXYCODONE HYDROCHLORIDE AND ACETAMINOPHEN 500 MG: 500 TABLET ORAL at 11:40

## 2021-01-26 RX ADMIN — SODIUM CHLORIDE, PRESERVATIVE FREE 10 ML: 5 INJECTION INTRAVENOUS at 11:45

## 2021-01-26 RX ADMIN — ENOXAPARIN SODIUM 40 MG: 40 INJECTION SUBCUTANEOUS at 23:52

## 2021-01-26 RX ADMIN — AMLODIPINE BESYLATE 5 MG: 5 TABLET ORAL at 11:39

## 2021-01-26 RX ADMIN — Medication 50 MG: at 11:42

## 2021-01-26 RX ADMIN — DEXAMETHASONE 6 MG: 4 TABLET ORAL at 11:41

## 2021-01-26 NOTE — PROGRESS NOTES
Hospitalist Progress Note      Patient:  Lisa Perry    Unit/Bed:4B-05/005-A  YOB: 1940  MRN: [id number]   Acct: [de-identified]   PCP: Maribel Meyer MD  Date of Admission: 1/17/2021    Assessment/Plan:    1. Acute hypoxic respiratory failure due to Covid-19 PNA: on HFO fiO2 95% at 60 lpm w/ SaO2 90%. On Dexamethasone (added PPI), Remdesivir, zinc, vitamin C, vitamin D, aspirin, LMWH BID dosing, s/p convalescent plasma; procal elevated; on Azirtho/ceftriaxone for possible superimposed bacterial PNA, prothrombotic levels elevated; CTPE negative; added ASA 81 QD. IS/Acapella/PT to see. Wean supplemental O2 as tolerated to SpO2 > 90%    1/19: improving slowly. Slowly weaning; CCM. Chest physio ordered  1/20: pt still on HFNC, cont current management. Will give Plasma today (2U), cont Decadron (Day 4), Remdisivr (Day 3), Vitamin C, D, Zinc, ASA 81, Lovenox BID, Acapella and IS.   1/21: went from BiPAP to HFNC today. Will cont current management. Will give Plasma today (2U), cont Decadron (Day 5), Remdisivr (Day 4), IV ABx (Day 5), Vitamin C, D, Zinc, ASA 81, Lovenox BID, Acapella and IS.   1/22: pt desaturated to 70's on HFNC, placed back on BiPAP. CT Chest ordered and showing improved aeration however worsening effusions. Will give IV lasix 40 x 1 dose, given Plasma (2U), cont Decadron (Day 6), Remdisivr (Day 5), IV ABx (Day 6), Vitamin C, D, Zinc, ASA 81, Lovenox BID, Acapella and IS.   1/23: pt on HFNC doing well, 70% with 60 L/Min. Responded well to IV lasix, will give another IV 40 x 1 today. Given Plasma (2U), cont Decadron (Day 7), Remdisivr (Day 5), IV ABx (Day 7), Vitamin C, D, Zinc, ASA 81, Lovenox BID, Acapella and IS.   1/24: pt on HFNC doing well, 50% with 60 L/Min. Given two doses of IV lasix 40 and responded well.  Given Plasma (2U), cont Decadron (Day 8), Remdisivr (Day 5), IV ABx (Day 7), Vitamin C, D, Zinc, ASA 81, Lovenox BID, Acapella and IS.   1/25: pt still on HFNC on same settings as yesterday. Given Plasma (2U), cont Decadron (Day 9), completed 5 days of IV Remdisivr and 7 days of IV Abx, Vitamin C, D, Zinc, ASA 81, Lovenox BID, Acapella and IS.   1/26: pt still on HFNC, 50% and 50 L/Min. Given Plasma (2U), cont Decadron (Day 10), completed 5 days of IV Remdisivr and 7 days of IV Abx, Vitamin C, D, Zinc, ASA 81, Lovenox BID, Acapella and IS. 2. Hyponatremia: Na 124 on arrival, no prior to compare; likely hypovolemic d/t inadequate PO intake; pt received 1.0 L NS bolus in ED. Started on NS at 100 cc/hr; will trend serum Na to reassess response; urine lytes/Osm sent as well.     1/21: Na down to 130 today. NS Infusion stopped yesterday. Will continue to trend Na levels and if declines again will consult Nephrology. 1/22: Na up to 133 today. Cont to trend daily. 3. Essential hypertension: continue home medications, fairly controlled; will monitor. 4. Hx of RCC: s/p R nephrectomy  5. Code status: Full code; palliative care to see  6. PT/OT to see        Chief Complaint: SOB    Initial H and P:-    Admitted for management of Acute hypoxic respiratory failure secondary to COVID 19      Subjective (past 24 hours): No new issues overnight. Denies CP/fever/chills/palpitation/ or GI symptoms. Tolerating PO intake. On HFNC. Past medical history, family history, social history and allergies reviewed again and is unchanged since admission. ROS (12 point review of systems completed. Pertinent positives noted.  Otherwise ROS is negative)     Medications:  Reviewed    Infusion Medications    sodium chloride      sodium chloride       Scheduled Medications    pantoprazole  40 mg Oral QAM AC    amLODIPine  5 mg Oral Daily    [Held by provider] losartan  100 mg Oral Daily    sodium chloride flush  10 mL Intravenous 2 times per day    enoxaparin  40 mg Subcutaneous Q12H    zinc sulfate  50 mg Oral Daily    Vitamin D  1,000 Units Oral Daily    ascorbic acid  500 mg Oral Daily    aspirin  81 mg Oral Daily    dexamethasone  6 mg Oral Daily     PRN Meds: sodium chloride, sodium chloride, sodium chloride flush, promethazine **OR** ondansetron, polyethylene glycol, acetaminophen **OR** acetaminophen, sodium chloride, potassium chloride **OR** potassium alternative oral replacement **OR** potassium chloride, magnesium sulfate      Intake/Output Summary (Last 24 hours) at 1/26/2021 1027  Last data filed at 1/25/2021 2137  Gross per 24 hour   Intake 10 ml   Output --   Net 10 ml       Diet:  DIET GENERAL;  Dietary Nutrition Supplements: Standard High Calorie Oral Supplement    Exam:  BP (!) 131/55   Pulse 59   Temp 97.8 °F (36.6 °C) (Oral)   Resp 18   Ht 5' 6\" (1.676 m)   Wt 185 lb 14.4 oz (84.3 kg)   SpO2 95%   BMI 30.01 kg/m²      General appearance: No apparent distress, appears stated age and cooperative on HFNC. HEENT: Pupils equal, round, and reactive to light. Conjunctivae/corneas clear. Neck: Supple, with full range of motion. No jugular venous distention. Trachea midline. Respiratory:  Diminished A/E at lung bases w/ scattered upper airway sounds. Cardiovascular: Regular rate and rhythm with normal S1/S2 without murmurs, rubs or gallops. Abdomen: Soft, non-tender, non-distended with normal bowel sounds. Musculoskeletal: passive and active ROM x 4 extremities. Skin: Skin color, texture, turgor normal.  No rashes or lesions. Neurologic:  Neurovascularly intact without any focal sensory/motor deficits.  Cranial nerves: II-XII intact, grossly non-focal.  Psychiatric: Alert and oriented, thought content appropriate, normal insight  Capillary Refill: Brisk,< 3 seconds   Peripheral Pulses: +2 palpable, equal bilaterally     Labs:   Recent Labs     01/24/21  0610 01/25/21  0644 01/26/21  0611   WBC 11.7* 12.5* 9.3   HGB 12.6* 13.1* 12.4*   HCT 36.9* 38.0* 36.9*    362 323     Recent Labs     01/24/21  0610 Overall aeration of the lungs appears significantly improved from prior examination dated 1/20/2021.   2. Minimal right basilar pleural effusion is seen. **This report has been created using voice recognition software. It may contain minor errors which are inherent in voice recognition technology. **      Final report electronically signed by Dr. America Rossi on 1/25/2021 4:00 PM      CT CHEST WO CONTRAST   Final Result   1. Stable left lung infiltrates   2. Slight improved aeration of the right lower lobe. 3. Slight increase in size of bilateral small pleural effusions. **This report has been created using voice recognition software. It may contain minor errors which are inherent in voice recognition technology. **      Final report electronically signed by Dr. Curry Lan on 1/22/2021 11:37 AM      XR CHEST 1 VIEW   Final Result   Worsening airspace infiltrates worsening appearance of the chest.            **This report has been created using voice recognition software. It may contain minor errors which are inherent in voice recognition technology. **      Final report electronically signed by Dr. Curry Lan on 1/20/2021 10:21 AM      CTA CHEST W WO CONTRAST   Final Result   Impression:   1. No pulmonary emboli. 2.  No thoracic aortic aneurysm or dissection   3. Bilateral perihilar and peripheral airspace disease which may    represent pulmonary edema versus atypical viral pneumonia. 4.  Small bilateral pleural fluid collections   5. Multiple hepatic hypodensities are indeterminate and may represent    cysts, hemangiomas or metastatic disease.   Nonemergent multiphase imaging    of the entire liver can be performed for further evaluation      This document has been electronically signed by: Christine Ltot MD on    01/17/2021 09:04 PM      All CT scans at this facility use dose modulation, iterative    reconstruction, and/or weight-based   dosing when appropriate to reduce radiation dose to as low as reasonably    achievable. XR CHEST PORTABLE   Final Result   Impression:   1. Perihilar opacities may represent pulmonary edema versus atypical    pneumonia. This document has been electronically signed by: Modesto Angela MD on    01/17/2021 09:03 PM           Cta Chest W Wo Contrast    Result Date: 1/17/2021  Exam:  CTA chest with IV contrast. Procedure: MIP reconstructions are performed Comparison: Chest x-ray 1/17/2021 Findings: No pulmonary emboli. No thoracic aortic aneurysm or dissection. No hilar or mediastinal adenopathy. No pericardial fluid collection. No pneumothorax or pneumomediastinum. Bilateral perihilar and peripheral airspace disease may represent pulmonary edema versus atypical viral pneumonia. Small bilateral pleural fluid collections. Small hiatal hernia. Calcifications in the spleen and right hilar lymph nodes, consistent with previous granulomatous disease. Multiple hepatic hypodensities are indeterminate and may represent cysts, hemangiomas or metastatic disease. No thoracic compression fracture. Impression: 1. No pulmonary emboli. 2.  No thoracic aortic aneurysm or dissection 3. Bilateral perihilar and peripheral airspace disease which may represent pulmonary edema versus atypical viral pneumonia. 4.  Small bilateral pleural fluid collections 5. Multiple hepatic hypodensities are indeterminate and may represent cysts, hemangiomas or metastatic disease. Nonemergent multiphase imaging of the entire liver can be performed for further evaluation This document has been electronically signed by: Modesto Angela MD on 01/17/2021 09:04 PM All CT scans at this facility use dose modulation, iterative reconstruction, and/or weight-based dosing when appropriate to reduce radiation dose to as low as reasonably achievable.      Xr Chest Portable    Result Date: 1/17/2021  Exam: One View of the chest Comparison: 4/4/2018 Findings: New perihilar opacities may represent pulmonary edema versus atypical pneumonia. Cardiac silhouette is not enlarged. No pneumothorax. No pleural fluid collection. Old left-sided rib fractures     Impression: 1. Perihilar opacities may represent pulmonary edema versus atypical pneumonia. This document has been electronically signed by: Nataliya Johns MD on 01/17/2021 09:03 PM     With RN in room, patient was updated about and agreed upon the treatment plan, all the questions and concerns were addressed. Patient was seen in PPE (PERSONAL PROTECTIVE EQUIPMENT). Patient was interviewed in Strict Airborne and Droplet Precautions.     Electronically signed by Renny Colbert MD on 1/26/2021 at 10:27 AM

## 2021-01-26 NOTE — PROGRESS NOTES
[facility] CVICU 4B  Occupational Therapy  Daily Note  Time:   Time In:   Time Out: 630  Timed Code Treatment Minutes: 41 Minutes  Minutes: 41          Date: 2021  Patient Name: Rickie Pierre,   Gender: male      Room: [facility]005-A  MRN: [id number]  : 1940  (79 y.o.)  Referring Practitioner: Dr. Marino Johnson MD  Diagnosis: Acute Hypoxemic Respiratory Failure due to COVID-19  Additional Pertinent Hx: Pt presents to the Emergency Department for the evaluation of cough and shortness of breath. Patient reports gradual onset of symptoms over the past 5 days with significant worsening of the shortness of breath over the past 2 days. He reports unproductive cough associated with decreased sense of taste and smell and some nasal congestion over the past week. Denies any known sick contacts and has not been tested for Covid since symptom onset. He reports worsening of shortness of breath with exertion, improvement with rest.  Reports generalized weakness that occurs with activity. Restrictions/Precautions:  Restrictions/Precautions: General Precautions, Fall Risk, Isolation  Position Activity Restriction  Other position/activity restrictions: COVID+;  on BiPAP with settings 65%, PEEP 12;  on BiPAP and at 65%, PEEP 10;  high flow; ; HFNC at 55 L and 80%--- HFNC 50LPM 38% O2      SUBJECTIVE: Pt up in chair, highly motivated to engage in session. Patient on 50 L O2 via High Flow at 38%  upon arrival to room. Patient O2 sats at 95 %. Upon activity patient dropping O2 at 84-87 %. Pt requiring extended rest break(s) to recover. *Extended education required for continuum of care, recommending TCU. Pt reports he may be agreeable to transitioning to TCU if needed. PAIN: denied    COGNITION: WFL    ADL:   No ADL's completed this session. Vira Libman BALANCE:  Sitting Balance:  Supervision. Standing Balance: Contact Guard Assistance.       BED MOBILITY:  Not Tested    TRANSFERS:  Sit to Stand:  Contact Guard Assistance. Stand to Sit: Contact Guard Assistance. FUNCTIONAL MOBILITY:  Assistive Device: Rolling Walker  Assist Level:  Contact Guard Assistance. Distance: x6 feet forward and backward x 3 trials  Limited with mobility d/t O2 decreasing and limited space in pt's room with high flow in place     ADDITIONAL ACTIVITIES:  Pt completed B UE exs while standing x 1 trial, O2 dropping quickly with standing exs, switched to seated exs to increase strength required to complete ADL routine, x 1 set, x 10 reps, no resistance: shoulder flexion, chest press, bicep curls, horizontal AB/ADduction. Fair pace, exhibits min fatigue, requires min RBs during exs. Also provided with HEP with yellow theraband. Good understanding of exs. ASSESSMENT:     Activity Tolerance:  Patient tolerance of  treatment: good. Discharge Recommendations: Continue to assess pending progress, Subacute/Skilled Nursing Facility Recommending TCU  Equipment Recommendations: Equipment Needed: Yes  Other: Shower chair  Plan: Times per week: 5x  Specific instructions for Next Treatment: Functional mobility as able; ADLs and energy conservation techniques; UE exercises and proper breathing technique  Current Treatment Recommendations: Functional Mobility Training, Endurance Training, Self-Care / ADL, Patient/Caregiver Education & Training  Plan Comment: Pt would benefit from continued skilled OT services when medically stable and discharged from Acute. Recommend a stay on TCU prior to returning home. Patient Education  Patient Education: Energy Conservation and Home Exercise Program    Goals  Short term goals  Time Frame for Short term goals: By discharge  Short term goal 1: Pt will demonstrate simple standing ADLs while following pursed lip breathing with SBA maintain O2 saturation > than 90% for increased activity tolerance.   Short term goal 2: Pt will complete a spongebath or other ADL while following energy conservation techniques with setup A and verbal cues if needed to increase his endurance for doing his morning ADLs. Short term goal 3: Pt will complete BUE ROM/moderate resistance exercises while following proper breathing technique to increase his strength and endurance for ease of doing light homemaking or shopping. Short term goal 4: Pt will further demonstrate functional mobility with OTR to prepare for doing self while out of bed. MET, revise    Revised Short-Term Goals  Short term goals  Time Frame for Short term goals: By discharge  Short term goal 1: Pt will demonstrate simple standing ADLs while following pursed lip breathing with SBA maintain O2 saturation > than 90% for increased activity tolerance. Short term goal 2: Pt will complete a spongebath or other ADL while following energy conservation techniques with setup A and verbal cues if needed to increase his endurance for doing his morning ADLs. Short term goal 3: Pt will complete BUE ROM/moderate resistance exercises while following proper breathing technique to increase his strength and endurance for ease of doing light homemaking or shopping. Short term goal 4: Pt will complete functional mobilty to/from BSC/bedside chair with SBA while maintaining O2 above 90% to increase indep with accessing environment. Following session, patient left in safe position with all fall risk precautions in place.

## 2021-01-26 NOTE — PROGRESS NOTES
[address] [address]  INPATIENT PHYSICAL THERAPY  DAILY NOTE  [facility] CVICU 4B - 4B-05/005-A    Time In: 9989  Time Out: 1046  Timed Code Treatment Minutes: 40 Minutes  Minutes: 44          Date: 2021  Patient Name: Michelet Cha,  Gender:  male        MRN: [id number]  : 1940  (79 y.o.)     Referring Practitioner: Alejandrina Dalton MD  Diagnosis: Acute hypoxemic respiratory failure due to COVID-19  Additional Pertinent Hx: Per ED note, pt \"is a 79 y.o. male who presents to the Emergency Department for the evaluation of cough and shortness of breath. Patient reports gradual onset of symptoms over the past 5 days with significant worsening of the shortness of breath over the past 2 days. He reports unproductive cough associated with decreased sense of taste and smell and some nasal congestion over the past week. Denies any known sick contacts and has not been tested for Covid since symptom onset. He reports worsening of shortness of breath with exertion, improvement with rest.  Reports generalized weakness that occurs with activity. He has been taking Tylenol and was also started on vitamin C, vitamin D, zinc by his daughter earlier in the season. He denies any pulmonary history. He is not a smoker. He denies any associated fevers, chills, generalized body aches, headache, sore throat, vomiting, diarrhea, wheezing, edema, orthopnea or dizziness. \"     Prior Level of Function:  Lives With: Spouse  Type of Home: House  Home Layout: One level  Home Access: Stairs to enter with rails  Entrance Stairs - Number of Steps: 3-4 steps with 1 rail  Home Equipment: (none)   Bathroom Shower/Tub: Walk-in shower  Bathroom Toilet: Standard  Bathroom Equipment: Grab bars in shower  Bathroom Accessibility: Accessible    Receives Help From: Family  ADL Assistance: [address] [place] [address] Avenue: Independent  Homemaking Responsibilities: Yes  Ambulation Assistance: Independent  Transfer Assistance: for increased independence with functional mobility. Functional Outcome Measures: Completed  AM-PAC Inpatient Mobility Raw Score : 18  AM-PAC Inpatient T-Scale Score : 43.63    ASSESSMENT:  Assessment: Patient progressing toward established goals. Activity Tolerance:  Patient tolerance of  treatment: good. Pt limited by SOB , fatigue and hypoxia with mobility. Will benefit from cont Pt at this time     Equipment Recommendations: Other: monitor for needs - poss RW at discharge? Discharge Recommendations:  Continue to assess pending progress would benefit from cont PT    Plan: Times per week: 4-5x GM  Current Treatment Recommendations: Strengthening, Gait Training, Patient/Caregiver Education & Training, Equipment Evaluation, Education, & procurement, Balance Training, Functional Mobility Training, Endurance Training, Transfer Training, Safety Education & Training, Home Exercise Program    Patient Education  Patient Education: Plan of Care, Transfers, Verbal Exercise Instruction, deep/ pursed lip breathing    Goals:  Patient goals : go home  Short term goals  Time Frame for Short term goals: at discharge  Short term goal 1: Pt to be Mod I for supine <> sit to get in/out of bed  Short term goal 2: Pt to be Mod I for sit <> stand to get up to ambulate  Short term goal 3: Pt to ambulate > 60 ft with/without AD with Supervision for household distances  Long term goals  Time Frame for Long term goals : not set due to short ELOS    Following session, patient left in safe position with all fall risk precautions in place.

## 2021-01-26 NOTE — FLOWSHEET NOTE
Peg, patient's daughter, called and given a update at this time. All questions answered at this time.

## 2021-01-26 NOTE — PLAN OF CARE
Problem: Nutrition  Goal: Optimal nutrition therapy  Outcome: Ongoing   Nutrition Problem #1: Inadequate oral intake  Intervention: Food and/or Nutrient Delivery: Continue Current Diet, Continue Oral Nutrition Supplement  Nutritional Goals: Pt. will tolerate and consume 75% or more of meals during LOS.

## 2021-01-26 NOTE — PROGRESS NOTES
Comprehensive Nutrition Assessment    Type and Reason for Visit:  Reassess    Nutrition Recommendations/Plan:   Continue current diet and ONS. Consider MVI. Nutrition Assessment:    Pt improving from a nutritional standpoint AEB pt report he is consuming ONS at meals and improved respiratory status. Remains at risk for further nutritional compromise r/t admit with covid in hypermetabolic phase, respiratory distress-on high flow now (was bipap), continued lack of taste/smell, poor appetite, and underlying medical condition (hx: kidney cancer, HTN). Nutrition recommendations/interventions as per above. Malnutrition Assessment:  Malnutrition Status:  Insufficient data    Context:  Acute Illness       Estimated Daily Nutrient Needs:  Energy (kcal):  0312-3271 kcals (30-32 kcals/kg IBW/day)-late phase covid; Weight Used for Energy Requirements:  Ideal(65 kg)     Protein (g):   gm (1.2-2); Weight Used for Protein Requirements:  Ideal(65 kgm)          Nutrition Related Findings:  Covid positive, dx 1/17/21 able to transition to high flow oxygen (was on bipap). MAP 76.  1/26/21: BUN 24, Creatinine 0.6, Glucose 107. BM 1/23/21. Pt reports appetite poor for the last 2 weeks, no taste/smell, still no taste/smell. Reports he is drinking Ensure shakes. Appetite \"about the same\". Rx: vitamin C, vitamin D, decadron, zincate. Wounds:  None       Current Nutrition Therapies:    DIET GENERAL;  Dietary Nutrition Supplements: Standard High Calorie Oral Supplement    Anthropometric Measures:  · Height: 5' 6\" (167.6 cm)  · Current Body Weight: 185 lb 14.4 oz (84.3 kg)(1/25/21 no edema)   · Admission Body Weight: 182 lb 4.8 oz (82.7 kg)(1/18 no edema)    · Usual Body Weight: (~ 180# per pt. No previous weights in EMR.)     · Ideal Body Weight: 142 lbs;   · BMI: 30  · Adjusted Body Weight:  ; No Adjustment   · BMI Categories: Obese Class 1 (BMI 30.0-34. 9)       Nutrition Diagnosis:   · Inadequate oral intake related to altered taste perception(poor appetite) as evidenced by poor intake prior to admission, intake 26-50%      Nutrition Interventions:   Food and/or Nutrient Delivery:  Continue Current Diet, Continue Oral Nutrition Supplement  Nutrition Education/Counseling:  Education initiated(Encouraged oral intake and continued ONS use.)   Coordination of Nutrition Care:  Continue to monitor while inpatient    Goals:  Pt. will tolerate and consume 75% or more of meals during LOS. Nutrition Monitoring and Evaluation:   Behavioral-Environmental Outcomes:  None Identified   Food/Nutrient Intake Outcomes:  Food and Nutrient Intake, Supplement Intake, Vitamin/Mineral Intake  Physical Signs/Symptoms Outcomes:  Biochemical Data, GI Status, Fluid Status or Edema, Hemodynamic Status, Nutrition Focused Physical Findings, Skin, Weight     Discharge Planning:     Too soon to determine     Electronically signed by Karolina Sage, TRISTAN, LD on 1/26/21 at 11:58 AM EST    Contact: [phone]

## 2021-01-27 LAB
ANION GAP SERPL CALCULATED.3IONS-SCNC: 10 MEQ/L (ref 8–16)
BASOPHILS # BLD: 0.1 %
BASOPHILS ABSOLUTE: 0 THOU/MM3 (ref 0–0.1)
BUN BLDV-MCNC: 25 MG/DL (ref 7–22)
C-REACTIVE PROTEIN: 1.31 MG/DL (ref 0–1)
CALCIUM SERPL-MCNC: 8.2 MG/DL (ref 8.5–10.5)
CHLORIDE BLD-SCNC: 102 MEQ/L (ref 98–111)
CO2: 24 MEQ/L (ref 23–33)
CREAT SERPL-MCNC: 0.6 MG/DL (ref 0.4–1.2)
D-DIMER QUANTITATIVE: 1003 NG/ML FEU (ref 0–500)
EOSINOPHIL # BLD: 0 %
EOSINOPHILS ABSOLUTE: 0 THOU/MM3 (ref 0–0.4)
ERYTHROCYTE [DISTWIDTH] IN BLOOD BY AUTOMATED COUNT: 12.8 % (ref 11.5–14.5)
ERYTHROCYTE [DISTWIDTH] IN BLOOD BY AUTOMATED COUNT: 46 FL (ref 35–45)
FERRITIN: 1803 NG/ML (ref 22–322)
GFR SERPL CREATININE-BSD FRML MDRD: > 90 ML/MIN/1.73M2
GLUCOSE BLD-MCNC: 88 MG/DL (ref 70–108)
HCT VFR BLD CALC: 37.8 % (ref 42–52)
HEMOGLOBIN: 12.7 GM/DL (ref 14–18)
IMMATURE GRANS (ABS): 0.08 THOU/MM3 (ref 0–0.07)
IMMATURE GRANULOCYTES: 0.6 %
LYMPHOCYTES # BLD: 6.1 %
LYMPHOCYTES ABSOLUTE: 0.8 THOU/MM3 (ref 1–4.8)
MAGNESIUM: 2.3 MG/DL (ref 1.6–2.4)
MCH RBC QN AUTO: 33.1 PG (ref 26–33)
MCHC RBC AUTO-ENTMCNC: 33.6 GM/DL (ref 32.2–35.5)
MCV RBC AUTO: 98.4 FL (ref 80–94)
MONOCYTES # BLD: 7 %
MONOCYTES ABSOLUTE: 0.9 THOU/MM3 (ref 0.4–1.3)
NUCLEATED RED BLOOD CELLS: 0 /100 WBC
PLATELET # BLD: 325 THOU/MM3 (ref 130–400)
PMV BLD AUTO: 9.1 FL (ref 9.4–12.4)
POTASSIUM SERPL-SCNC: 4.3 MEQ/L (ref 3.5–5.2)
RBC # BLD: 3.84 MILL/MM3 (ref 4.7–6.1)
SEG NEUTROPHILS: 86.2 %
SEGMENTED NEUTROPHILS ABSOLUTE COUNT: 11.2 THOU/MM3 (ref 1.8–7.7)
SODIUM BLD-SCNC: 136 MEQ/L (ref 135–145)
WBC # BLD: 13 THOU/MM3 (ref 4.8–10.8)

## 2021-01-27 PROCEDURE — 85379 FIBRIN DEGRADATION QUANT: CPT

## 2021-01-27 PROCEDURE — 97110 THERAPEUTIC EXERCISES: CPT

## 2021-01-27 PROCEDURE — 85025 COMPLETE CBC W/AUTO DIFF WBC: CPT

## 2021-01-27 PROCEDURE — 86140 C-REACTIVE PROTEIN: CPT

## 2021-01-27 PROCEDURE — 80048 BASIC METABOLIC PNL TOTAL CA: CPT

## 2021-01-27 PROCEDURE — 94669 MECHANICAL CHEST WALL OSCILL: CPT

## 2021-01-27 PROCEDURE — 97530 THERAPEUTIC ACTIVITIES: CPT

## 2021-01-27 PROCEDURE — 6370000000 HC RX 637 (ALT 250 FOR IP): Performed by: PHYSICIAN ASSISTANT

## 2021-01-27 PROCEDURE — 6360000002 HC RX W HCPCS: Performed by: INTERNAL MEDICINE

## 2021-01-27 PROCEDURE — 83735 ASSAY OF MAGNESIUM: CPT

## 2021-01-27 PROCEDURE — 6360000002 HC RX W HCPCS: Performed by: PHYSICIAN ASSISTANT

## 2021-01-27 PROCEDURE — 99232 SBSQ HOSP IP/OBS MODERATE 35: CPT | Performed by: INTERNAL MEDICINE

## 2021-01-27 PROCEDURE — 2700000000 HC OXYGEN THERAPY PER DAY

## 2021-01-27 PROCEDURE — 82728 ASSAY OF FERRITIN: CPT

## 2021-01-27 PROCEDURE — 36415 COLL VENOUS BLD VENIPUNCTURE: CPT

## 2021-01-27 PROCEDURE — 6370000000 HC RX 637 (ALT 250 FOR IP): Performed by: HOSPITALIST

## 2021-01-27 PROCEDURE — 94761 N-INVAS EAR/PLS OXIMETRY MLT: CPT

## 2021-01-27 PROCEDURE — 2580000003 HC RX 258: Performed by: PHYSICIAN ASSISTANT

## 2021-01-27 PROCEDURE — 97535 SELF CARE MNGMENT TRAINING: CPT

## 2021-01-27 PROCEDURE — 2060000000 HC ICU INTERMEDIATE R&B

## 2021-01-27 RX ADMIN — PANTOPRAZOLE SODIUM 40 MG: 40 TABLET, DELAYED RELEASE ORAL at 07:03

## 2021-01-27 RX ADMIN — SODIUM CHLORIDE, PRESERVATIVE FREE 10 ML: 5 INJECTION INTRAVENOUS at 21:00

## 2021-01-27 RX ADMIN — SODIUM CHLORIDE, PRESERVATIVE FREE 10 ML: 5 INJECTION INTRAVENOUS at 09:06

## 2021-01-27 RX ADMIN — AMLODIPINE BESYLATE 5 MG: 5 TABLET ORAL at 09:05

## 2021-01-27 RX ADMIN — DEXAMETHASONE 6 MG: 4 TABLET ORAL at 09:04

## 2021-01-27 RX ADMIN — ENOXAPARIN SODIUM 40 MG: 40 INJECTION SUBCUTANEOUS at 23:28

## 2021-01-27 RX ADMIN — ENOXAPARIN SODIUM 40 MG: 40 INJECTION SUBCUTANEOUS at 12:21

## 2021-01-27 RX ADMIN — Medication 50 MG: at 09:04

## 2021-01-27 RX ADMIN — Medication 1000 UNITS: at 09:04

## 2021-01-27 RX ADMIN — ASPIRIN 81 MG: 81 TABLET, CHEWABLE ORAL at 09:04

## 2021-01-27 RX ADMIN — OXYCODONE HYDROCHLORIDE AND ACETAMINOPHEN 500 MG: 500 TABLET ORAL at 09:05

## 2021-01-27 ASSESSMENT — PAIN SCALES - GENERAL
PAINLEVEL_OUTOF10: 0
PAINLEVEL_OUTOF10: 0

## 2021-01-27 NOTE — PROGRESS NOTES
[facility]  Occupational Therapy  Daily Note  Time:   Time In: 1330  Time Out: 1409  Timed Code Treatment Minutes: 44 Minutes  Minutes: 39          Date: 2021  Patient Name: Mandi Seals,   Gender: male      Room: -05/005-A  MRN: [id number]  : 1940  (79 y.o.)  Referring Practitioner: Dr. Mayra Archibald MD  Diagnosis: Acute Hypoxemic Respiratory Failure due to COVID-19  Additional Pertinent Hx: Pt presents to the Emergency Department for the evaluation of cough and shortness of breath. Patient reports gradual onset of symptoms over the past 5 days with significant worsening of the shortness of breath over the past 2 days. He reports unproductive cough associated with decreased sense of taste and smell and some nasal congestion over the past week. Denies any known sick contacts and has not been tested for Covid since symptom onset. He reports worsening of shortness of breath with exertion, improvement with rest.  Reports generalized weakness that occurs with activity. Restrictions/Precautions:  Restrictions/Precautions: General Precautions, Fall Risk, Isolation  Position Activity Restriction  Other position/activity restrictions: COVID+;  on BiPAP with settings 65%, PEEP 12;  on BiPAP and at 65%, PEEP 10;  high flow; ; HFNC at 55 L and 80%--- HFNC 50LPM 38% O2  --- HFNC @ 45LPM 40% O2    10% on  HFNC      SUBJECTIVE: pt sitting in recliner when arrived. Pt doing much better he states and is getting weaned off down on O2     PAIN: pt did not state any pain during therapy session     COGNITION: Decreased Safety Awareness    ADL:   Lower Extremity Dressing: Contact Guard Assistance. sitting in recliner able to adjust slipper socks . BALANCE:  Standing Balance: Contact Guard Assistance. at [address] [place] [address]     BED MOBILITY:  Not Tested    TRANSFERS:  Sit to Stand:  Contact Guard Assistance. from recliner with cues for hand placment.  pt wanted to grab RW to stand up   Stand to Sit: [employer]. to recliner with cues for hand placment     FUNCTIONAL MOBILITY:  Assistive Device: Rolling Walker  Assist Level:  Contact Guard Assistance. Distance: in room forward and back due to limited on HFNC   Pt had no LOB and demo good posture. Pt stood also and walked in place at RW    Pt able to maintain O2 stats at 93-96% with high flow NC     ADDITIONAL ACTIVITIES:  .Patient identified a personal goal to increase UB strength and improve overall endurance so they can complete their toilet & shower transfers; skilled edu on UE strengthening and patient completed BUE strengthening exercises x15 reps x1 set this date with a moderate resistive band  in all joints and all planes. Patient tolerated in sitting , requiring short  rest breaks. Pt required min verbal  cues for technique. .Patient identified one of their personal goals is to be able to sustain functional standing positions in order to complete various ADL and IADL skills in standing position, such as sinkside grooming or washing dishes. Dynamic standing task was then facilitated to challenge 1-2 hand release . Patient required CGA , and demo'ed an endurance of 2-5  minutes. ASSESSMENT:     Activity Tolerance:  Patient tolerance of  treatment: good. Discharge Recommendations: Continue to assess pending progress, Subacute/Skilled Nursing Facility   Equipment Recommendations: Equipment Needed: Yes  Other: Shower chair  Plan: Times per week: 5x  Specific instructions for Next Treatment: Functional mobility as able; ADLs and energy conservation techniques; UE exercises and proper breathing technique  Current Treatment Recommendations: Functional Mobility Training, Endurance Training, Self-Care / ADL, Patient/Caregiver Education & Training  Plan Comment: Pt would benefit from continued skilled OT services when medically stable and discharged from Acute.   Recommend a stay on TCU prior to returning home.    Patient Education  Patient Education: ADL's, Home Exercise Program and Importance of Increasing Activity    Goals  Short term goals  Time Frame for Short term goals: By discharge  Short term goal 1: Pt will demonstrate simple standing ADLs while following pursed lip breathing with SBA maintain O2 saturation > than 90% for increased activity tolerance. Short term goal 2: Pt will complete a spongebath or other ADL while following energy conservation techniques with setup A and verbal cues if needed to increase his endurance for doing his morning ADLs. Short term goal 3: Pt will complete BUE ROM/moderate resistance exercises while following proper breathing technique to increase his strength and endurance for ease of doing light homemaking or shopping. Short term goal 4: Pt will complete functional mobilty to/from BSC/bedside chair with SBA while maintaining O2 above 90% to increase indep with accessing environment. Following session, patient left in safe position with all fall risk precautions in place.

## 2021-01-27 NOTE — PROGRESS NOTES
[facility]  INPATIENT PHYSICAL THERAPY  DAILY NOTE  [facility] CVICU 4B - 4B-05/005-A    Time In: 36  Time Out: 1221  Timed Code Treatment Minutes: 29 Minutes  Minutes: 34          Date: 2021  Patient Name: Elyssa Aguirre,  Gender:  male        MRN: [id number]  : 1940  (79 y.o.)     Referring Practitioner: Tricia Triana MD  Diagnosis: Acute hypoxemic respiratory failure due to COVID-19  Additional Pertinent Hx: Per ED note, pt \"is a 79 y.o. male who presents to the Emergency Department for the evaluation of cough and shortness of breath. Patient reports gradual onset of symptoms over the past 5 days with significant worsening of the shortness of breath over the past 2 days. He reports unproductive cough associated with decreased sense of taste and smell and some nasal congestion over the past week. Denies any known sick contacts and has not been tested for Covid since symptom onset. He reports worsening of shortness of breath with exertion, improvement with rest.  Reports generalized weakness that occurs with activity. He has been taking Tylenol and was also started on vitamin C, vitamin D, zinc by his daughter earlier in the season. He denies any pulmonary history. He is not a smoker. He denies any associated fevers, chills, generalized body aches, headache, sore throat, vomiting, diarrhea, wheezing, edema, orthopnea or dizziness. \"     Prior Level of Function:  Lives With: Spouse  Type of Home: House  Home Layout: One level  Home Access: Stairs to enter with rails  Entrance Stairs - Number of Steps: 3-4 steps with 1 rail  Home Equipment: (none)   Bathroom Shower/Tub: Walk-in shower  Bathroom Toilet: Standard  Bathroom Equipment: Grab bars in shower  Bathroom Accessibility: Accessible    Receives Help From: Family  ADL Assistance: [facility]: Independent  Homemaking Responsibilities: Yes  Ambulation Assistance: Independent  Transfer Assistance: Independent  Active : Yes  Additional Comments: Pt reports being Independent with all mobility in PLOF. Pt shared homemaking with his spouse prior to admission. Pt's spouse is also hospitalized with COVID-19 virus at this time. Restrictions/Precautions:  Restrictions/Precautions: General Precautions, Fall Risk, Isolation  Position Activity Restriction  Other position/activity restrictions: COVID+; 1/18 on BiPAP with settings 65%, PEEP 12; 1/19 on BiPAP and at 65%, PEEP 10; 1/20 high flow; 1/22; HFNC at 55 L and 80%--- HFNC 50LPM 38% O2 1/26 --- HFNC @ 45LPM 40% O2 1/27     SUBJECTIVE: RN approved session. Pt in recliner upon arrival and agrees to therapy. Pt on HFNC 45LPM 40% O2, O2 ranged from 85-96%    PAIN: no c/o pain    OBJECTIVE:  Bed Mobility:  Not Tested    Transfers:  Sit to Stand: Stand By Assistance, [employer] Assistance  Stand to Sit:Stand By Assistance, Contact Guard Assistance    Ambulation:  Contact Guard Assistance  Distance: 2ft forard and retro- limited by high flow line  Surface: Level Tile  Device:Rolling Walker  Gait Deviations: Forward Flexed Posture, Slow Emilia, Decreased Step Length Bilaterally, Decreased Gait Speed, Decreased Heel Strike Bilaterally, Wide Base of Support and Unsteady Gait    Balance:  Dynamic Standing Balance: Contact Guard Assistance  Pt stood and amb in place x~1 min. Pt unsteady and SOB. SPO2 dropped to mid/upper 80s with mobiltiy    Exercise:  Patient was guided in 1 set(s) 10 reps of exercise to both lower extremities. Ankle pumps, Glut sets, Hip abduction/adduction, Upper trunk rotations, Shoulder horizontal abduction/adduction, Shoulder rolls, Seated marches, Long arc quads, Seated isometric hip adduction and used light green tband for resistance. Exercises were completed for increased independence with functional mobility.     Functional Outcome Measures: Completed  AM-PAC Inpatient Mobility Raw Score : 18  AM-PAC Inpatient T-Scale Score : 43.63    ASSESSMENT:  Assessment: Patient progressing toward established goals. Activity Tolerance:  Patient tolerance of  treatment: good. Pt limited by SOB and hypoxia. Will benefit from cont PT at thsi time     Equipment Recommendations: Other: monitor for needs - poss RW at discharge? Discharge Recommendations:  Continue to assess pending progress, would benefit from cont PT    Plan: Times per week: 4-5x GM  Current Treatment Recommendations: Strengthening, Gait Training, Patient/Caregiver Education & Training, Equipment Evaluation, Education, & procurement, Balance Training, Functional Mobility Training, Endurance Training, Transfer Training, Safety Education & Training, Home Exercise Program    Patient Education  Patient Education: Plan of Care, Transfers, Gait, Verbal Exercise Instruction, deep brething    Goals:  Patient goals : go home  Short term goals  Time Frame for Short term goals: at discharge  Short term goal 1: Pt to be Mod I for supine <> sit to get in/out of bed  Short term goal 2: Pt to be Mod I for sit <> stand to get up to ambulate  Short term goal 3: Pt to ambulate > 60 ft with/without AD with Supervision for household distances  Long term goals  Time Frame for Long term goals : not set due to short ELOS    Following session, patient left in safe position with all fall risk precautions in place.

## 2021-01-27 NOTE — PROGRESS NOTES
Hospitalist Progress Note      Patient:  Christine Preciado    Unit/Bed:4B-05/005-A  YOB: 1940  MRN: [id number]   Acct: [de-identified]   PCP: Carmine Beasley MD  Date of Admission: 1/17/2021    Assessment/Plan:    1. Acute hypoxic respiratory failure due to Covid-19 PNA: on HFO fiO2 95% at 60 lpm w/ SaO2 90%. On Dexamethasone (added PPI), Remdesivir, zinc, vitamin C, vitamin D, aspirin, LMWH BID dosing, s/p convalescent plasma; procal elevated; on Azirtho/ceftriaxone for possible superimposed bacterial PNA, prothrombotic levels elevated; CTPE negative; added ASA 81 QD. IS/Acapella/PT to see. Wean supplemental O2 as tolerated to SpO2 > 90%    1/19: improving slowly. Slowly weaning; CCM. Chest physio ordered  1/20: pt still on HFNC, cont current management. Will give Plasma today (2U), cont Decadron (Day 4), Remdisivr (Day 3), Vitamin C, D, Zinc, ASA 81, Lovenox BID, Acapella and IS.   1/21: went from BiPAP to HFNC today. Will cont current management. Will give Plasma today (2U), cont Decadron (Day 5), Remdisivr (Day 4), IV ABx (Day 5), Vitamin C, D, Zinc, ASA 81, Lovenox BID, Acapella and IS.   1/22: pt desaturated to 70's on HFNC, placed back on BiPAP. CT Chest ordered and showing improved aeration however worsening effusions. Will give IV lasix 40 x 1 dose, given Plasma (2U), cont Decadron (Day 6), Remdisivr (Day 5), IV ABx (Day 6), Vitamin C, D, Zinc, ASA 81, Lovenox BID, Acapella and IS.   1/23: pt on HFNC doing well, 70% with 60 L/Min. Responded well to IV lasix, will give another IV 40 x 1 today. Given Plasma (2U), cont Decadron (Day 7), Remdisivr (Day 5), IV ABx (Day 7), Vitamin C, D, Zinc, ASA 81, Lovenox BID, Acapella and IS.   1/24: pt on HFNC doing well, 50% with 60 L/Min. Given two doses of IV lasix 40 and responded well.  Given Plasma (2U), cont Decadron (Day 8), Remdisivr (Day 5), IV ABx (Day 7), Vitamin C, D, Zinc, ASA 81, Lovenox BID, Acapella and IS.   1/25: pt still on HFNC on same settings as yesterday. Given Plasma (2U), cont Decadron (Day 9), completed 5 days of IV Remdisivr and 7 days of IV Abx, Vitamin C, D, Zinc, ASA 81, Lovenox BID, Acapella and IS.   1/26: pt still on HFNC, 50% and 50 L/Min. Given Plasma (2U), cont Decadron (Day 10), completed 5 days of IV Remdisivr and 7 days of IV Abx, Vitamin C, D, Zinc, ASA 81, Lovenox BID, Acapella and IS.   1/27: weaning down on HFNC, 40% and 40 L/Min. Given Plasma (2U), cont Decadron (Day 11), completed 5 days of IV Remdisivr and 7 days of IV Abx, Vitamin C, D, Zinc, ASA 81, Lovenox BID, Acapella and IS. 2. Hyponatremia: Na 124 on arrival, no prior to compare; likely hypovolemic d/t inadequate PO intake; pt received 1.0 L NS bolus in ED. Started on NS at 100 cc/hr; will trend serum Na to reassess response; urine lytes/Osm sent as well.     1/21: Na down to 130 today. NS Infusion stopped yesterday. Will continue to trend Na levels and if declines again will consult Nephrology. 1/22: Na up to 133 today. Cont to trend daily. 3. Essential hypertension: continue home medications, fairly controlled; will monitor. 4. Hx of RCC: s/p R nephrectomy  5. Code status: Full code; palliative care to see  6. PT/OT: TCU once ready for discharge         Chief Complaint: SOB    Initial H and P:-    Admitted for management of Acute hypoxic respiratory failure secondary to COVID 19      Subjective (past 24 hours): No new issues overnight. Sitting up in chair. Denies CP/fever/chills/palpitation/ or GI symptoms. Tolerating PO intake. On HFNC. Past medical history, family history, social history and allergies reviewed again and is unchanged since admission. ROS (12 point review of systems completed. Pertinent positives noted.  Otherwise ROS is negative)     Medications:  Reviewed    Infusion Medications    sodium chloride      sodium chloride       Scheduled Medications    dexamethasone  6 mg Peripheral Pulses: +2 palpable, equal bilaterally     Labs:   Recent Labs     01/25/21  0644 01/26/21  0611 01/27/21  0547   WBC 12.5* 9.3 13.0*   HGB 13.1* 12.4* 12.7*   HCT 38.0* 36.9* 37.8*    323 325     Recent Labs     01/25/21  0644 01/26/21  0611 01/27/21  0547   * 136 136   K 4.3 4.1 4.3   CL 99 102 102   CO2 26 23 24   BUN 22 24* 25*   CREATININE 0.6 0.6 0.6   CALCIUM 8.1* 8.1* 8.2*     No results for input(s): AST, ALT, BILIDIR, BILITOT, ALKPHOS in the last 72 hours. No results for input(s): INR in the last 72 hours. No results for input(s): Neldon Docker in the last 72 hours. Microbiology:    Blood culture #1:   Lab Results   Component Value Date    BC No growth-preliminary No growth  01/17/2021       Blood culture #2:No results found for: Kathrin Killer    Organism:  Lab Results   Component Value Date    ORG Enterobacter cloacae complex 04/03/2018    ORG Candida parapsilosis 04/03/2018    ORG Pseudomonas aeruginosa 04/03/2018         Lab Results   Component Value Date    LABGRAM  04/03/2018     Few segmented neutrophils observed. Rare epithelial cells observed. Rare budding yeast.         MRSA culture only:No results found for: [address] [place] [address]    Urine culture: No results found for: LABURIN    Respiratory culture: No results found for: CULTRESP    Aerobic and Anaerobic :  Lab Results   Component Value Date    LABAERO  04/03/2018     No growth-preliminary  At least one isolate is resistant in vitro to current  regimen. LABAERO  04/03/2018     very light growth  Enterobacter species which may be initially susceptible to  third generation cephalosporins may develop resistance within  three to four days of initiation of this antimicrobial  therapy.       LABAERO light growth 04/03/2018    LABAERO very light growth 04/03/2018     Lab Results   Component Value Date    LABANAE  04/03/2018     No anaerobes isolated- preliminary  No anaerobes isolated         Urinalysis:    No results found for: Caretha Munda, BACTERIA, RBCUA, BLOODU, SPECGRAV, [address]    Radiology:  XR CHEST PORTABLE   Final Result   1. Patchy airspace opacities overlying the mid to lower lung zones are demonstrated. This is most pronounced at the lateral right lung base. Overall aeration of the lungs appears significantly improved from prior examination dated 1/20/2021.   2. Minimal right basilar pleural effusion is seen. **This report has been created using voice recognition software. It may contain minor errors which are inherent in voice recognition technology. **      Final report electronically signed by Dr. Elba English on 1/25/2021 4:00 PM      CT CHEST WO CONTRAST   Final Result   1. Stable left lung infiltrates   2. Slight improved aeration of the right lower lobe. 3. Slight increase in size of bilateral small pleural effusions. **This report has been created using voice recognition software. It may contain minor errors which are inherent in voice recognition technology. **      Final report electronically signed by Dr. Lenny Solomon on 1/22/2021 11:37 AM      XR CHEST 1 VIEW   Final Result   Worsening airspace infiltrates worsening appearance of the chest.            **This report has been created using voice recognition software. It may contain minor errors which are inherent in voice recognition technology. **      Final report electronically signed by Dr. Lenny Solomon on 1/20/2021 10:21 AM      CTA CHEST W WO CONTRAST   Final Result   Impression:   1. No pulmonary emboli. 2.  No thoracic aortic aneurysm or dissection   3. Bilateral perihilar and peripheral airspace disease which may    represent pulmonary edema versus atypical viral pneumonia. 4.  Small bilateral pleural fluid collections   5. Multiple hepatic hypodensities are indeterminate and may represent    cysts, hemangiomas or metastatic disease.   Nonemergent multiphase imaging    of the entire liver can be performed for further evaluation This document has been electronically signed by: Yuki Lynch MD on    01/17/2021 09:04 PM      All CT scans at this facility use dose modulation, iterative    reconstruction, and/or weight-based   dosing when appropriate to reduce radiation dose to as low as reasonably    achievable. XR CHEST PORTABLE   Final Result   Impression:   1. Perihilar opacities may represent pulmonary edema versus atypical    pneumonia. This document has been electronically signed by: Yuki Lynch MD on    01/17/2021 09:03 PM           Cta Chest W Wo Contrast    Result Date: 1/17/2021  Exam:  CTA chest with IV contrast. Procedure: MIP reconstructions are performed Comparison: Chest x-ray 1/17/2021 Findings: No pulmonary emboli. No thoracic aortic aneurysm or dissection. No hilar or mediastinal adenopathy. No pericardial fluid collection. No pneumothorax or pneumomediastinum. Bilateral perihilar and peripheral airspace disease may represent pulmonary edema versus atypical viral pneumonia. Small bilateral pleural fluid collections. Small hiatal hernia. Calcifications in the spleen and right hilar lymph nodes, consistent with previous granulomatous disease. Multiple hepatic hypodensities are indeterminate and may represent cysts, hemangiomas or metastatic disease. No thoracic compression fracture. Impression: 1. No pulmonary emboli. 2.  No thoracic aortic aneurysm or dissection 3. Bilateral perihilar and peripheral airspace disease which may represent pulmonary edema versus atypical viral pneumonia. 4.  Small bilateral pleural fluid collections 5. Multiple hepatic hypodensities are indeterminate and may represent cysts, hemangiomas or metastatic disease.   Nonemergent multiphase imaging of the entire liver can be performed for further evaluation This document has been electronically signed by: Yuki Lynch MD on 01/17/2021 09:04 PM All CT scans at this facility use dose modulation, iterative reconstruction, and/or weight-based dosing when appropriate to reduce radiation dose to as low as reasonably achievable. Xr Chest Portable    Result Date: 1/17/2021  Exam: One View of the chest Comparison: 4/4/2018 Findings: New perihilar opacities may represent pulmonary edema versus atypical pneumonia. Cardiac silhouette is not enlarged. No pneumothorax. No pleural fluid collection. Old left-sided rib fractures     Impression: 1. Perihilar opacities may represent pulmonary edema versus atypical pneumonia. This document has been electronically signed by: Dary Nice MD on 01/17/2021 09:03 PM     With RN in room, patient was updated about and agreed upon the treatment plan, all the questions and concerns were addressed. Patient was seen in PPE (PERSONAL PROTECTIVE EQUIPMENT). Patient was interviewed in Strict Airborne and Droplet Precautions.     Electronically signed by Daniel Dacosta MD on 1/27/2021 at 10:34 AM

## 2021-01-28 LAB
ANION GAP SERPL CALCULATED.3IONS-SCNC: 8 MEQ/L (ref 8–16)
BASOPHILS # BLD: 0.1 %
BASOPHILS ABSOLUTE: 0 THOU/MM3 (ref 0–0.1)
BUN BLDV-MCNC: 30 MG/DL (ref 7–22)
C-REACTIVE PROTEIN: 1.6 MG/DL (ref 0–1)
CALCIUM SERPL-MCNC: 8.6 MG/DL (ref 8.5–10.5)
CHLORIDE BLD-SCNC: 103 MEQ/L (ref 98–111)
CO2: 26 MEQ/L (ref 23–33)
CREAT SERPL-MCNC: 0.7 MG/DL (ref 0.4–1.2)
D-DIMER QUANTITATIVE: 974 NG/ML FEU (ref 0–500)
EOSINOPHIL # BLD: 0 %
EOSINOPHILS ABSOLUTE: 0 THOU/MM3 (ref 0–0.4)
ERYTHROCYTE [DISTWIDTH] IN BLOOD BY AUTOMATED COUNT: 12.8 % (ref 11.5–14.5)
ERYTHROCYTE [DISTWIDTH] IN BLOOD BY AUTOMATED COUNT: 46.6 FL (ref 35–45)
FERRITIN: 1904 NG/ML (ref 22–322)
GFR SERPL CREATININE-BSD FRML MDRD: > 90 ML/MIN/1.73M2
GLUCOSE BLD-MCNC: 96 MG/DL (ref 70–108)
HCT VFR BLD CALC: 38.4 % (ref 42–52)
HEMOGLOBIN: 12.7 GM/DL (ref 14–18)
IMMATURE GRANS (ABS): 0.08 THOU/MM3 (ref 0–0.07)
IMMATURE GRANULOCYTES: 0.8 %
LYMPHOCYTES # BLD: 10.7 %
LYMPHOCYTES ABSOLUTE: 1 THOU/MM3 (ref 1–4.8)
MAGNESIUM: 2.3 MG/DL (ref 1.6–2.4)
MCH RBC QN AUTO: 33 PG (ref 26–33)
MCHC RBC AUTO-ENTMCNC: 33.1 GM/DL (ref 32.2–35.5)
MCV RBC AUTO: 99.7 FL (ref 80–94)
MONOCYTES # BLD: 5.5 %
MONOCYTES ABSOLUTE: 0.5 THOU/MM3 (ref 0.4–1.3)
NUCLEATED RED BLOOD CELLS: 0 /100 WBC
PLATELET # BLD: 271 THOU/MM3 (ref 130–400)
PMV BLD AUTO: 9.2 FL (ref 9.4–12.4)
POTASSIUM SERPL-SCNC: 5 MEQ/L (ref 3.5–5.2)
RBC # BLD: 3.85 MILL/MM3 (ref 4.7–6.1)
SEG NEUTROPHILS: 82.9 %
SEGMENTED NEUTROPHILS ABSOLUTE COUNT: 8.1 THOU/MM3 (ref 1.8–7.7)
SODIUM BLD-SCNC: 137 MEQ/L (ref 135–145)
WBC # BLD: 9.8 THOU/MM3 (ref 4.8–10.8)

## 2021-01-28 PROCEDURE — 99232 SBSQ HOSP IP/OBS MODERATE 35: CPT | Performed by: INTERNAL MEDICINE

## 2021-01-28 PROCEDURE — 94761 N-INVAS EAR/PLS OXIMETRY MLT: CPT

## 2021-01-28 PROCEDURE — 97116 GAIT TRAINING THERAPY: CPT

## 2021-01-28 PROCEDURE — 6360000002 HC RX W HCPCS: Performed by: INTERNAL MEDICINE

## 2021-01-28 PROCEDURE — 85025 COMPLETE CBC W/AUTO DIFF WBC: CPT

## 2021-01-28 PROCEDURE — 6360000002 HC RX W HCPCS: Performed by: PHYSICIAN ASSISTANT

## 2021-01-28 PROCEDURE — 6370000000 HC RX 637 (ALT 250 FOR IP): Performed by: HOSPITALIST

## 2021-01-28 PROCEDURE — 94669 MECHANICAL CHEST WALL OSCILL: CPT

## 2021-01-28 PROCEDURE — 86140 C-REACTIVE PROTEIN: CPT

## 2021-01-28 PROCEDURE — 83735 ASSAY OF MAGNESIUM: CPT

## 2021-01-28 PROCEDURE — 97535 SELF CARE MNGMENT TRAINING: CPT

## 2021-01-28 PROCEDURE — 2060000000 HC ICU INTERMEDIATE R&B

## 2021-01-28 PROCEDURE — 36415 COLL VENOUS BLD VENIPUNCTURE: CPT

## 2021-01-28 PROCEDURE — 2580000003 HC RX 258: Performed by: PHYSICIAN ASSISTANT

## 2021-01-28 PROCEDURE — 82728 ASSAY OF FERRITIN: CPT

## 2021-01-28 PROCEDURE — 2700000000 HC OXYGEN THERAPY PER DAY

## 2021-01-28 PROCEDURE — 6370000000 HC RX 637 (ALT 250 FOR IP): Performed by: PHYSICIAN ASSISTANT

## 2021-01-28 PROCEDURE — 97110 THERAPEUTIC EXERCISES: CPT

## 2021-01-28 PROCEDURE — 80048 BASIC METABOLIC PNL TOTAL CA: CPT

## 2021-01-28 PROCEDURE — 85379 FIBRIN DEGRADATION QUANT: CPT

## 2021-01-28 PROCEDURE — 97530 THERAPEUTIC ACTIVITIES: CPT

## 2021-01-28 RX ADMIN — ENOXAPARIN SODIUM 40 MG: 40 INJECTION SUBCUTANEOUS at 12:40

## 2021-01-28 RX ADMIN — DEXAMETHASONE 6 MG: 4 TABLET ORAL at 08:48

## 2021-01-28 RX ADMIN — ENOXAPARIN SODIUM 40 MG: 40 INJECTION SUBCUTANEOUS at 23:59

## 2021-01-28 RX ADMIN — PANTOPRAZOLE SODIUM 40 MG: 40 TABLET, DELAYED RELEASE ORAL at 08:48

## 2021-01-28 RX ADMIN — SODIUM CHLORIDE, PRESERVATIVE FREE 10 ML: 5 INJECTION INTRAVENOUS at 20:38

## 2021-01-28 RX ADMIN — Medication 1000 UNITS: at 08:48

## 2021-01-28 RX ADMIN — SODIUM CHLORIDE, PRESERVATIVE FREE 10 ML: 5 INJECTION INTRAVENOUS at 08:49

## 2021-01-28 RX ADMIN — ASPIRIN 81 MG: 81 TABLET, CHEWABLE ORAL at 08:48

## 2021-01-28 RX ADMIN — Medication 50 MG: at 08:48

## 2021-01-28 RX ADMIN — OXYCODONE HYDROCHLORIDE AND ACETAMINOPHEN 500 MG: 500 TABLET ORAL at 08:48

## 2021-01-28 RX ADMIN — AMLODIPINE BESYLATE 5 MG: 5 TABLET ORAL at 08:48

## 2021-01-28 ASSESSMENT — PAIN SCALES - GENERAL
PAINLEVEL_OUTOF10: 0

## 2021-01-28 NOTE — PLAN OF CARE
Problem: Nutrition  Goal: Optimal nutrition therapy  Outcome: Ongoing   Nutrition Problem #1: Inadequate oral intake  Intervention: Food and/or Nutrient Delivery: Continue Current Diet, Modify Oral Nutrition Supplement  Nutritional Goals: Pt. will tolerate and consume 75% or more of meals during LOS.

## 2021-01-28 NOTE — PROGRESS NOTES
RTS.    BALANCE:  Standing Balance: Contact Guard Assistance. approx 3 minutes    BED MOBILITY:  Supine to Sit: Stand By Assistance HOB elevated, used bedrail  Sit to Supine: Stand By Assistance increased time for B LE, HOB flat    TRANSFERS:  Sit to Stand:  Contact Guard Assistance. EOB  Stand to Sit: Contact Guard Assistance. FUNCTIONAL MOBILITY:  Assistive Device: Rolling Walker  Assist Level:  Contact Guard Assistance. Distance: To and from bathroom     ADDITIONAL ACTIVITIES:  Patient identified a personal goal to increase UB strength and improve overall endurance so they can complete their toilet & shower transfers; skilled edu on UE strengthening and patient completed BUE strengthening exercises x10 reps x1 set this date with a yellow band in all joints and all planes. Patient tolerated well, requiring minimal rest breaks. ASSESSMENT:     Activity Tolerance:  Patient tolerance of  treatment: good. Discharge Recommendations: Continue to assess pending progress, Subacute/Skilled Nursing Facility   Equipment Recommendations: Equipment Needed: Yes  Other: Shower chair  Plan: Times per week: 5x  Specific instructions for Next Treatment: Functional mobility as able; ADLs and energy conservation techniques; UE exercises and proper breathing technique  Current Treatment Recommendations: Functional Mobility Training, Endurance Training, Self-Care / ADL, Patient/Caregiver Education & Training  Plan Comment: Pt would benefit from continued skilled OT services when medically stable and discharged from Acute. Recommend a stay on TCU prior to returning home. Patient Education  Patient Education: ADL's and Home Exercise Program    Goals  Short term goals  Time Frame for Short term goals: By discharge  Short term goal 1: Pt will demonstrate simple standing ADLs while following pursed lip breathing with SBA maintain O2 saturation > than 90% for increased activity tolerance.   Short term goal 2: Pt will

## 2021-01-28 NOTE — PROGRESS NOTES
Comprehensive Nutrition Assessment    Type and Reason for Visit:  Reassess(request of ONS flavor change)    Nutrition Recommendations/Plan:   Continue current diet. ONS flavor adjusted per pt request: Ensure Enlive TID. Consider MVI. Nutrition Assessment:    Pt improving from a nutritional standpoint AEB report of improved appetite/intake, consuming % of meals, and receptive to drink ONS if flavor changed. Remains at risk for further nutritional compromise r/t previous poor appetite, taste changes-\"things taste salty\",  admit with covid in hypermetabolic phase, respiratory distress, and underlying medical condition (hx: kidney cancer, HTN). Nutrition recommendations/interventions as per above. Malnutrition Assessment:  Malnutrition Status:  Insufficient data , covid  Context:  Acute Illness       Estimated Daily Nutrient Needs:  Energy (kcal):  4202-4957 kcals (30-32 kcals/kg IBW/day)-late phase covid; Weight Used for Energy Requirements:  Ideal(65 kg)     Protein (g):   gm (1.2-2); Weight Used for Protein Requirements:  Ideal(65 kgm)            Nutrition Related Findings:  Covid positive, dx 1/17/21, on nasal cannula now, 5 liters. BUN 30, Creatinine 0.7, Glucose 96. Rx: vitamin C, vitamin C, zincate. Pt reports improved appetite and intake, % of most meals. Will drink ONS if different flavor which was adjusted. BM 1/23/21      Wounds:  None       Current Nutrition Therapies:    DIET GENERAL;  Dietary Nutrition Supplements: Standard High Calorie Oral Supplement    Anthropometric Measures:  · Height: 5' 6\" (167.6 cm)  · Current Body Weight: 185 lb 14.4 oz (84.3 kg)(1/25/21 no edema)   · Admission Body Weight: 182 lb 4.8 oz (82.7 kg)(1/18 no edema)    · Usual Body Weight: (~ 180# per pt. No previous weights in EMR.)     · Ideal Body Weight: 142 lbs;   · BMI: 30  · Adjusted Body Weight:  ; No Adjustment   · BMI Categories: Obese Class 1 (BMI 30.0-34. 9)       Nutrition Diagnosis: · Inadequate oral intake related to altered taste perception(poor appetite) as evidenced by poor intake prior to admission      Nutrition Interventions:   Food and/or Nutrient Delivery:  Continue Current Diet, Modify Oral Nutrition Supplement  Nutrition Education/Counseling:  Education initiated(Encouraged oral intake and continued ONS use.)   Coordination of Nutrition Care:  Continue to monitor while inpatient    Goals:  Pt. will tolerate and consume 75% or more of meals during LOS. Nutrition Monitoring and Evaluation:   Behavioral-Environmental Outcomes:  None Identified   Food/Nutrient Intake Outcomes:  Food and Nutrient Intake, Supplement Intake, Vitamin/Mineral Intake  Physical Signs/Symptoms Outcomes:  Biochemical Data, GI Status, Fluid Status or Edema, Hemodynamic Status, Nutrition Focused Physical Findings, Skin, Weight     Discharge Planning:     Too soon to determine     Electronically signed by Javier Okeefe RD, LD on 1/28/21 at 11:22 AM EST    Contact: [phone]

## 2021-01-28 NOTE — PROGRESS NOTES
Completed  AM-PAC Inpatient Mobility Raw Score : 18  AM-PAC Inpatient T-Scale Score : 43.63    ASSESSMENT:  Assessment: Patient progressing toward established goals. Activity Tolerance:  Patient tolerance of  treatment: good. Pt limitd by SOB and hypoxia with mobility. Pt will benefit from cont PT at this time to improve independence with mobility and to ensure safety at d/c     Equipment Recommendations: Other: monitor for needs - poss RW at discharge? Discharge Recommendations:  Continue to assess pending progress subacute skilled nursing facility    Plan: Times per week: 4-5x GM  Current Treatment Recommendations: Strengthening, Gait Training, Patient/Caregiver Education & Training, Equipment Evaluation, Education, & procurement, Balance Training, Functional Mobility Training, Endurance Training, Transfer Training, Safety Education & Training, Home Exercise Program    Patient Education  Patient Education: Plan of Care, [employer] Mobility, Transfers, Gait, Verbal Exercise Instruction, deep breathin    Goals:  Patient goals : go home  Short term goals  Time Frame for Short term goals: at discharge  Short term goal 1: Pt to be Mod I for supine <> sit to get in/out of bed  Short term goal 2: Pt to be Mod I for sit <> stand to get up to ambulate  Short term goal 3: Pt to ambulate > 60 ft with/without AD with Supervision for household distances  Long term goals  Time Frame for Long term goals : not set due to short ELOS    Following session, patient left in safe position with all fall risk precautions in place.

## 2021-01-28 NOTE — PROGRESS NOTES
This RN updated patient's daughter, Kanwal, about pt's current condition. HIPPA code provided. All questions and concerns were addressed.

## 2021-01-28 NOTE — PROGRESS NOTES
Hospitalist Progress Note      Patient:  Omayra Cruz    Unit/Bed:4B-05/005-A  YOB: 1940  MRN: [id number]   Acct: [de-identified]   PCP: Sudheer Taylor MD  Date of Admission: 1/17/2021    Assessment/Plan:    1. Acute hypoxic respiratory failure due to Covid-19 PNA: on HFO fiO2 95% at 60 lpm w/ SaO2 90%. On Dexamethasone (added PPI), Remdesivir, zinc, vitamin C, vitamin D, aspirin, LMWH BID dosing, s/p convalescent plasma; procal elevated; on Azirtho/ceftriaxone for possible superimposed bacterial PNA, prothrombotic levels elevated; CTPE negative; added ASA 81 QD. IS/Acapella/PT to see. Wean supplemental O2 as tolerated to SpO2 > 90%    1/19: improving slowly. Slowly weaning; CCM. Chest physio ordered  1/20: pt still on HFNC, cont current management. Will give Plasma today (2U), cont Decadron (Day 4), Remdisivr (Day 3), Vitamin C, D, Zinc, ASA 81, Lovenox BID, Acapella and IS.   1/21: went from BiPAP to HFNC today. Will cont current management. Will give Plasma today (2U), cont Decadron (Day 5), Remdisivr (Day 4), IV ABx (Day 5), Vitamin C, D, Zinc, ASA 81, Lovenox BID, Acapella and IS.   1/22: pt desaturated to 70's on HFNC, placed back on BiPAP. CT Chest ordered and showing improved aeration however worsening effusions. Will give IV lasix 40 x 1 dose, given Plasma (2U), cont Decadron (Day 6), Remdisivr (Day 5), IV ABx (Day 6), Vitamin C, D, Zinc, ASA 81, Lovenox BID, Acapella and IS.   1/23: pt on HFNC doing well, 70% with 60 L/Min. Responded well to IV lasix, will give another IV 40 x 1 today. Given Plasma (2U), cont Decadron (Day 7), Remdisivr (Day 5), IV ABx (Day 7), Vitamin C, D, Zinc, ASA 81, Lovenox BID, Acapella and IS.   1/24: pt on HFNC doing well, 50% with 60 L/Min. Given two doses of IV lasix 40 and responded well.  Given Plasma (2U), cont Decadron (Day 8), Remdisivr (Day 5), IV ABx (Day 7), Vitamin C, D, Zinc, ASA 81, Lovenox BID, Acapella and IS.   1/25: pt still on HFNC on same settings as yesterday. Given Plasma (2U), cont Decadron (Day 9), completed 5 days of IV Remdisivr and 7 days of IV Abx, Vitamin C, D, Zinc, ASA 81, Lovenox BID, Acapella and IS.   1/26: pt still on HFNC, 50% and 50 L/Min. Given Plasma (2U), cont Decadron (Day 10), completed 5 days of IV Remdisivr and 7 days of IV Abx, Vitamin C, D, Zinc, ASA 81, Lovenox BID, Acapella and IS.   1/27: weaning down on HFNC, 40% and 40 L/Min. Given Plasma (2U), cont Decadron (Day 11), completed 5 days of IV Remdisivr and 7 days of IV Abx, Vitamin C, D, Zinc, ASA 81, Lovenox BID, Acapella and IS.    1/28: weaned off of HFNC, down to 5L NC Given Plasma (2U), completed 12 days of PO Decadron, completed 5 days of IV Remdisivr and 7 days of IV Abx, Vitamin C, D, Zinc, ASA 81, Lovenox BID, Acapella and IS. 2. Hyponatremia: Na 124 on arrival, no prior to compare; likely hypovolemic d/t inadequate PO intake; pt received 1.0 L NS bolus in ED. Started on NS at 100 cc/hr; will trend serum Na to reassess response; urine lytes/Osm sent as well.     1/21: Na down to 130 today. NS Infusion stopped yesterday. Will continue to trend Na levels and if declines again will consult Nephrology. 1/22: Na up to 133 today. Cont to trend daily. 3. Essential hypertension: continue home medications, fairly controlled; will monitor. 4. Hx of RCC: s/p R nephrectomy  5. Code status: Full code; palliative care to see  6. PT/OT: TCU once ready for discharge         Chief Complaint: SOB    Initial H and P:-    Admitted for management of Acute hypoxic respiratory failure secondary to COVID 19      Subjective (past 24 hours): No new issues overnight. Sitting up in chair. Denies CP/fever/chills/palpitation/ or GI symptoms. Tolerating PO intake. Weaned off of HFNC down to NC. Wife at bedside.        Past medical history, family history, social history and allergies reviewed again and is unchanged since admission. ROS (12 point review of systems completed. Pertinent positives noted. Otherwise ROS is negative)     Medications:  Reviewed    Infusion Medications    sodium chloride      sodium chloride       Scheduled Medications    dexamethasone  6 mg Oral Daily    pantoprazole  40 mg Oral QAM AC    amLODIPine  5 mg Oral Daily    [Held by provider] losartan  100 mg Oral Daily    sodium chloride flush  10 mL Intravenous 2 times per day    enoxaparin  40 mg Subcutaneous Q12H    zinc sulfate  50 mg Oral Daily    Vitamin D  1,000 Units Oral Daily    ascorbic acid  500 mg Oral Daily    aspirin  81 mg Oral Daily     PRN Meds: sodium chloride, sodium chloride, sodium chloride flush, promethazine **OR** ondansetron, polyethylene glycol, acetaminophen **OR** acetaminophen, sodium chloride, potassium chloride **OR** potassium alternative oral replacement **OR** potassium chloride, magnesium sulfate      Intake/Output Summary (Last 24 hours) at 1/28/2021 1034  Last data filed at 1/28/2021 0300  Gross per 24 hour   Intake 1400 ml   Output 250 ml   Net 1150 ml       Diet:  DIET GENERAL;  Dietary Nutrition Supplements: Standard High Calorie Oral Supplement    Exam:  BP (!) 134/59   Pulse 61   Temp 96.8 °F (36 °C) (Axillary)   Resp 22   Ht 5' 6\" (1.676 m)   Wt 185 lb 14.4 oz (84.3 kg)   SpO2 97%   BMI 30.01 kg/m²      General appearance: No apparent distress, appears stated age and cooperative on 5L NC.   HEENT: Pupils equal, round, and reactive to light. Conjunctivae/corneas clear. Neck: Supple, with full range of motion. No jugular venous distention. Trachea midline. Respiratory:  Diminished A/E at lung bases w/ scattered upper airway sounds. Cardiovascular: Regular rate and rhythm with normal S1/S2 without murmurs, rubs or gallops. Abdomen: Soft, non-tender, non-distended with normal bowel sounds. Musculoskeletal: passive and active ROM x 4 extremities.   Skin: Skin color, texture, turgor normal.  No rashes or lesions. Neurologic:  Neurovascularly intact without any focal sensory/motor deficits. Cranial nerves: II-XII intact, grossly non-focal.  Psychiatric: Alert and oriented, thought content appropriate, normal insight  Capillary Refill: Brisk,< 3 seconds   Peripheral Pulses: +2 palpable, equal bilaterally     Labs:   Recent Labs     01/26/21  0611 01/27/21  0547 01/28/21  0529   WBC 9.3 13.0* 9.8   HGB 12.4* 12.7* 12.7*   HCT 36.9* 37.8* 38.4*    325 271     Recent Labs     01/26/21  0611 01/27/21  0547 01/28/21  0529    136 137   K 4.1 4.3 5.0    102 103   CO2 23 24 26   BUN 24* 25* 30*   CREATININE 0.6 0.6 0.7   CALCIUM 8.1* 8.2* 8.6     No results for input(s): AST, ALT, BILIDIR, BILITOT, ALKPHOS in the last 72 hours. No results for input(s): INR in the last 72 hours. No results for input(s): [employer] in the last 72 hours. Microbiology:    Blood culture #1:   Lab Results   Component Value Date    BC No growth-preliminary No growth  01/17/2021       Blood culture #2:No results found for: Karel Mouse    Organism:  Lab Results   Component Value Date    ORG Enterobacter cloacae complex 04/03/2018    ORG Candida parapsilosis 04/03/2018    ORG Pseudomonas aeruginosa 04/03/2018         Lab Results   Component Value Date    LABGRAM  04/03/2018     Few segmented neutrophils observed. Rare epithelial cells observed. Rare budding yeast.         MRSA culture only:No results found for: [facility]    Urine culture: No results found for: LABURIN    Respiratory culture: No results found for: CULTRESP    Aerobic and Anaerobic :  Lab Results   Component Value Date    LABAERO  04/03/2018     No growth-preliminary  At least one isolate is resistant in vitro to current  regimen.       LABAERO  04/03/2018     very light growth  Enterobacter species which may be initially susceptible to  third generation cephalosporins may develop resistance within  three to four days of initiation of this antimicrobial  therapy. LABAERO light growth 04/03/2018    LABAERO very light growth 04/03/2018     Lab Results   Component Value Date    LABANAE  04/03/2018     No anaerobes isolated- preliminary  No anaerobes isolated         Urinalysis:    No results found for: [employer], BACTERIA, RBCUA, BLOODU, SPECGRAV, GLUCOSEU    Radiology:  XR CHEST PORTABLE   Final Result   1. Patchy airspace opacities overlying the mid to lower lung zones are demonstrated. This is most pronounced at the lateral right lung base. Overall aeration of the lungs appears significantly improved from prior examination dated 1/20/2021.   2. Minimal right basilar pleural effusion is seen. **This report has been created using voice recognition software. It may contain minor errors which are inherent in voice recognition technology. **      Final report electronically signed by Dr. Kamryn Jeronimo on 1/25/2021 4:00 PM      CT CHEST WO CONTRAST   Final Result   1. Stable left lung infiltrates   2. Slight improved aeration of the right lower lobe. 3. Slight increase in size of bilateral small pleural effusions. **This report has been created using voice recognition software. It may contain minor errors which are inherent in voice recognition technology. **      Final report electronically signed by Dr. Sherif Zurita on 1/22/2021 11:37 AM      XR CHEST 1 VIEW   Final Result   Worsening airspace infiltrates worsening appearance of the chest.            **This report has been created using voice recognition software. It may contain minor errors which are inherent in voice recognition technology. **      Final report electronically signed by Dr. Sherif Zurita on 1/20/2021 10:21 AM      CTA CHEST W WO CONTRAST   Final Result   Impression:   1. No pulmonary emboli. 2.  No thoracic aortic aneurysm or dissection   3.   Bilateral perihilar and peripheral airspace disease which may    represent pulmonary edema versus atypical viral pneumonia. 4.  Small bilateral pleural fluid collections   5. Multiple hepatic hypodensities are indeterminate and may represent    cysts, hemangiomas or metastatic disease. Nonemergent multiphase imaging    of the entire liver can be performed for further evaluation      This document has been electronically signed by: Huber Ogden MD on    01/17/2021 09:04 PM      All CT scans at this facility use dose modulation, iterative    reconstruction, and/or weight-based   dosing when appropriate to reduce radiation dose to as low as reasonably    achievable. XR CHEST PORTABLE   Final Result   Impression:   1. Perihilar opacities may represent pulmonary edema versus atypical    pneumonia. This document has been electronically signed by: Huber Ogden MD on    01/17/2021 09:03 PM           Cta Chest W Wo Contrast    Result Date: 1/17/2021  Exam:  CTA chest with IV contrast. Procedure: MIP reconstructions are performed Comparison: Chest x-ray 1/17/2021 Findings: No pulmonary emboli. No thoracic aortic aneurysm or dissection. No hilar or mediastinal adenopathy. No pericardial fluid collection. No pneumothorax or pneumomediastinum. Bilateral perihilar and peripheral airspace disease may represent pulmonary edema versus atypical viral pneumonia. Small bilateral pleural fluid collections. Small hiatal hernia. Calcifications in the spleen and right hilar lymph nodes, consistent with previous granulomatous disease. Multiple hepatic hypodensities are indeterminate and may represent cysts, hemangiomas or metastatic disease. No thoracic compression fracture. Impression: 1. No pulmonary emboli. 2.  No thoracic aortic aneurysm or dissection 3. Bilateral perihilar and peripheral airspace disease which may represent pulmonary edema versus atypical viral pneumonia. 4.  Small bilateral pleural fluid collections 5.   Multiple hepatic hypodensities are indeterminate and may represent cysts, hemangiomas or

## 2021-01-28 NOTE — CARE COORDINATION
DISASTER CHARTING    1/28/21, 11:16 AM EST    DISCHARGE ONGOING EVALUATION:     Dalton Covarrubias day: 11  Location: [facility]A Reason for admit: [facility] hypox[facility] [U07.1, J96.01]   Barriers to Discharge: Tmax 98.5, nasal cannula oxygen at 5 liters with saturations at 97%. PT/OT, Vitamin C,D,zinc. Decadron stopped, electrolyte replacement therapy. PCP: Martha Ordaz MD  Readmission Risk Score: 15%  Patient Goals/Plan/Treatment Preferences: from home with wife who is also hospitalized with Covid. TCU following. Possible TCU when medically stable. SW following as well.

## 2021-01-29 ENCOUNTER — HOSPITAL ENCOUNTER (INPATIENT)
Age: 81
LOS: 13 days | Discharge: HOME HEALTH | DRG: 177 | End: 2021-02-11
Attending: FAMILY MEDICINE | Admitting: FAMILY MEDICINE
Payer: MEDICARE

## 2021-01-29 VITALS
SYSTOLIC BLOOD PRESSURE: 118 MMHG | HEART RATE: 56 BPM | DIASTOLIC BLOOD PRESSURE: 58 MMHG | OXYGEN SATURATION: 100 % | WEIGHT: 180.8 LBS | HEIGHT: 66 IN | RESPIRATION RATE: 19 BRPM | BODY MASS INDEX: 29.06 KG/M2 | TEMPERATURE: 98 F

## 2021-01-29 DIAGNOSIS — R29.898 MUSCULAR DECONDITIONING: ICD-10-CM

## 2021-01-29 DIAGNOSIS — U07.1 PNEUMONIA DUE TO COVID-19 VIRUS: Primary | ICD-10-CM

## 2021-01-29 DIAGNOSIS — J12.82 PNEUMONIA DUE TO COVID-19 VIRUS: Primary | ICD-10-CM

## 2021-01-29 LAB
C-REACTIVE PROTEIN: 0.87 MG/DL (ref 0–1)
D-DIMER QUANTITATIVE: 871 NG/ML FEU (ref 0–500)
FERRITIN: 2259 NG/ML (ref 22–322)

## 2021-01-29 PROCEDURE — 97110 THERAPEUTIC EXERCISES: CPT

## 2021-01-29 PROCEDURE — 6360000002 HC RX W HCPCS: Performed by: PHYSICIAN ASSISTANT

## 2021-01-29 PROCEDURE — 1290000000 HC SEMI PRIVATE OTHER R&B

## 2021-01-29 PROCEDURE — 99239 HOSP IP/OBS DSCHRG MGMT >30: CPT | Performed by: INTERNAL MEDICINE

## 2021-01-29 PROCEDURE — 6370000000 HC RX 637 (ALT 250 FOR IP): Performed by: HOSPITALIST

## 2021-01-29 PROCEDURE — 2580000003 HC RX 258: Performed by: PHYSICIAN ASSISTANT

## 2021-01-29 PROCEDURE — 82728 ASSAY OF FERRITIN: CPT

## 2021-01-29 PROCEDURE — 36415 COLL VENOUS BLD VENIPUNCTURE: CPT

## 2021-01-29 PROCEDURE — 6370000000 HC RX 637 (ALT 250 FOR IP): Performed by: PHYSICIAN ASSISTANT

## 2021-01-29 PROCEDURE — 97116 GAIT TRAINING THERAPY: CPT

## 2021-01-29 PROCEDURE — 2500000003 HC RX 250 WO HCPCS: Performed by: INTERNAL MEDICINE

## 2021-01-29 PROCEDURE — 85379 FIBRIN DEGRADATION QUANT: CPT

## 2021-01-29 PROCEDURE — 94761 N-INVAS EAR/PLS OXIMETRY MLT: CPT

## 2021-01-29 PROCEDURE — 97530 THERAPEUTIC ACTIVITIES: CPT

## 2021-01-29 PROCEDURE — 86140 C-REACTIVE PROTEIN: CPT

## 2021-01-29 RX ORDER — ACETAMINOPHEN 325 MG/1
650 TABLET ORAL EVERY 6 HOURS PRN
Status: CANCELLED | OUTPATIENT
Start: 2021-01-29

## 2021-01-29 RX ORDER — ACETAMINOPHEN 325 MG/1
650 TABLET ORAL EVERY 6 HOURS PRN
Status: DISCONTINUED | OUTPATIENT
Start: 2021-01-29 | End: 2021-02-11 | Stop reason: HOSPADM

## 2021-01-29 RX ORDER — ASPIRIN 81 MG/1
81 TABLET, CHEWABLE ORAL DAILY
Status: DISCONTINUED | OUTPATIENT
Start: 2021-01-30 | End: 2021-02-11 | Stop reason: HOSPADM

## 2021-01-29 RX ORDER — PANTOPRAZOLE SODIUM 40 MG/1
40 TABLET, DELAYED RELEASE ORAL
Status: DISCONTINUED | OUTPATIENT
Start: 2021-01-30 | End: 2021-02-11 | Stop reason: HOSPADM

## 2021-01-29 RX ORDER — LOSARTAN POTASSIUM 100 MG/1
100 TABLET ORAL DAILY
Status: CANCELLED | OUTPATIENT
Start: 2021-01-30

## 2021-01-29 RX ORDER — POLYETHYLENE GLYCOL 3350 17 G/17G
17 POWDER, FOR SOLUTION ORAL DAILY PRN
Status: CANCELLED | OUTPATIENT
Start: 2021-01-29

## 2021-01-29 RX ORDER — ASCORBIC ACID 500 MG
500 TABLET ORAL DAILY
Status: DISCONTINUED | OUTPATIENT
Start: 2021-01-30 | End: 2021-02-11 | Stop reason: HOSPADM

## 2021-01-29 RX ORDER — VITAMIN B COMPLEX
1000 TABLET ORAL DAILY
Status: DISCONTINUED | OUTPATIENT
Start: 2021-01-30 | End: 2021-02-11 | Stop reason: HOSPADM

## 2021-01-29 RX ORDER — ASPIRIN 81 MG/1
81 TABLET, CHEWABLE ORAL DAILY
Status: CANCELLED | OUTPATIENT
Start: 2021-01-30

## 2021-01-29 RX ORDER — ZINC SULFATE 50(220)MG
50 CAPSULE ORAL DAILY
Status: CANCELLED | OUTPATIENT
Start: 2021-01-30

## 2021-01-29 RX ORDER — AMLODIPINE BESYLATE 5 MG/1
5 TABLET ORAL DAILY
Status: DISCONTINUED | OUTPATIENT
Start: 2021-01-30 | End: 2021-02-11 | Stop reason: HOSPADM

## 2021-01-29 RX ORDER — AMLODIPINE BESYLATE 5 MG/1
5 TABLET ORAL DAILY
Status: CANCELLED | OUTPATIENT
Start: 2021-01-30

## 2021-01-29 RX ORDER — ZINC SULFATE 50(220)MG
50 CAPSULE ORAL DAILY
Status: DISCONTINUED | OUTPATIENT
Start: 2021-01-30 | End: 2021-02-11 | Stop reason: HOSPADM

## 2021-01-29 RX ORDER — ACETAMINOPHEN 650 MG/1
650 SUPPOSITORY RECTAL EVERY 6 HOURS PRN
Status: CANCELLED | OUTPATIENT
Start: 2021-01-29

## 2021-01-29 RX ORDER — LOSARTAN POTASSIUM 100 MG/1
100 TABLET ORAL DAILY
Status: DISCONTINUED | OUTPATIENT
Start: 2021-01-30 | End: 2021-02-11 | Stop reason: HOSPADM

## 2021-01-29 RX ORDER — PANTOPRAZOLE SODIUM 40 MG/1
40 TABLET, DELAYED RELEASE ORAL
Status: CANCELLED | OUTPATIENT
Start: 2021-01-30

## 2021-01-29 RX ORDER — VITAMIN B COMPLEX
1000 TABLET ORAL DAILY
Status: CANCELLED | OUTPATIENT
Start: 2021-01-30

## 2021-01-29 RX ORDER — ACETAMINOPHEN 650 MG/1
650 SUPPOSITORY RECTAL EVERY 6 HOURS PRN
Status: DISCONTINUED | OUTPATIENT
Start: 2021-01-29 | End: 2021-02-11 | Stop reason: HOSPADM

## 2021-01-29 RX ORDER — POLYETHYLENE GLYCOL 3350 17 G/17G
17 POWDER, FOR SOLUTION ORAL DAILY PRN
Status: DISCONTINUED | OUTPATIENT
Start: 2021-01-29 | End: 2021-02-11 | Stop reason: HOSPADM

## 2021-01-29 RX ORDER — ASCORBIC ACID 500 MG
500 TABLET ORAL DAILY
Status: CANCELLED | OUTPATIENT
Start: 2021-01-30

## 2021-01-29 RX ADMIN — OXYCODONE HYDROCHLORIDE AND ACETAMINOPHEN 500 MG: 500 TABLET ORAL at 08:41

## 2021-01-29 RX ADMIN — ASPIRIN 81 MG: 81 TABLET, CHEWABLE ORAL at 08:42

## 2021-01-29 RX ADMIN — Medication 1000 UNITS: at 08:43

## 2021-01-29 RX ADMIN — Medication 5 UNITS: at 16:39

## 2021-01-29 RX ADMIN — Medication 50 MG: at 08:43

## 2021-01-29 RX ADMIN — ENOXAPARIN SODIUM 40 MG: 40 INJECTION SUBCUTANEOUS at 12:07

## 2021-01-29 RX ADMIN — SODIUM CHLORIDE, PRESERVATIVE FREE 10 ML: 5 INJECTION INTRAVENOUS at 08:42

## 2021-01-29 RX ADMIN — PANTOPRAZOLE SODIUM 40 MG: 40 TABLET, DELAYED RELEASE ORAL at 06:09

## 2021-01-29 RX ADMIN — AMLODIPINE BESYLATE 5 MG: 5 TABLET ORAL at 08:41

## 2021-01-29 ASSESSMENT — PAIN SCALES - GENERAL
PAINLEVEL_OUTOF10: 0
PAINLEVEL_OUTOF10: 0

## 2021-01-29 ASSESSMENT — PAIN DESCRIPTION - FREQUENCY: FREQUENCY: INTERMITTENT

## 2021-01-29 NOTE — PLAN OF CARE
Problem: Airway Clearance - Ineffective  Goal: Achieve or maintain patent airway  Outcome: Completed     Problem: Gas Exchange - Impaired  Goal: Absence of hypoxia  Outcome: Completed  Goal: Promote optimal lung function  Outcome: Completed     Problem: Breathing Pattern - Ineffective  Goal: Ability to achieve and maintain a regular respiratory rate  Outcome: Completed     Problem:  Body Temperature -  Risk of, Imbalanced  Goal: Ability to maintain a body temperature within defined limits  Outcome: Completed  Goal: Will regain or maintain usual level of consciousness  Outcome: Completed  Goal: Complications related to the disease process, condition or treatment will be avoided or minimized  Outcome: Completed     Problem: Isolation Precautions - Risk of Spread of Infection  Goal: Prevent transmission of infection  Outcome: Completed     Problem: Nutrition Deficits  Goal: Optimize nutritional status  Outcome: Completed     Problem: Risk for Fluid Volume Deficit  Goal: Maintain normal heart rhythm  Outcome: Completed  Goal: Maintain absence of muscle cramping  Outcome: Completed  Goal: Maintain normal serum potassium, sodium, calcium, phosphorus, and pH  Outcome: Completed     Problem: Loneliness or Risk for Loneliness  Goal: Demonstrate positive use of time alone when socialization is not possible  Outcome: Completed     Problem: Fatigue  Goal: Verbalize increase energy and improved vitality  Outcome: Completed     Problem: Patient Education: Go to Patient Education Activity  Goal: Patient/Family Education  Outcome: Completed     Problem: Falls - Risk of:  Goal: Will remain free from falls  Description: Will remain free from falls  Outcome: Completed  Goal: Absence of physical injury  Description: Absence of physical injury  Outcome: Completed     Problem: Nutrition  Goal: Optimal nutrition therapy  Outcome: Completed     Problem: Skin Integrity:  Goal: Will show no infection signs and symptoms  Description: Will

## 2021-01-29 NOTE — PROGRESS NOTES
[facility]  Transitional Care Unit Preadmission Assessment    Patient Name: Tammi Sheriff        MRN: [id number]    Christos: [de-identified]    : 1940  (79 y.o.)  Gender: male     Admitted From:  [x][facility] []Outside Admission - Location:  Date of Admission to the hospital:2021  Date patient eligible for admission:2021  Primary Diagnosis COVID-19      Did patient have surgery? [] Yes- Explain:   [x] No    Physicians:Hospitalist    Risk for clinical complications/co-morbidities: . Past Medical History:   Diagnosis Date    Arthritis     Cancer ([facility]     kidney, right - surgery & radiation    Cancer ([facility]     skin    Hypertension        Financial Information  Primary insurance:  [x]Medicare [] Medicare HMO  []Commercial insurance    []Medicaid   []Workers Compensation      Secondary Insurance:  []Medicare [] Medicare HMO  [x]Commercial insurance    []Medicaid   []Workers Compensation []None    Precautions:   []Cardiac Precautions  []Total hip precautions    []Weight Bearing status:  [x]Safety Precautions/Concerns  []Visually impaired   []Hard of Hearing     Isolation Precautions:         [x]Yes  []No  If Yes:  [x] Droplet  []Contact []Airborne                   []VRE          []MRSA               []C-diff         [] TB  [x] Other: Covid-19       Skills:   [x]Physical therapy     [x]Occupational Therapy   []IV Antibiotics   [] IV meds   [] TPN     []PEG tube Feedings      []New Colostomy   [] Ileostomy care/teaching    [] Speech Therapy   []Wound Vac   []Complex Dressing Changes    []Terminal care    []Other       Has patient had Prior Skilled care in the Last 60 days:  []Yes  [x]NO   If Yes:   Skilled Facility:    How many skilled days were used?

## 2021-01-29 NOTE — PLAN OF CARE
[address] [address]  Physical Medicine Case Management Assessment    [] Inpatient Rehabilitation Unit  [x] Transitional Care Unit    Patient Name: Chely Cohen        MRN: [id number]    : 1940  (79 y.o.)  Gender: male   Date of Admission: 2021  1:50 PM    Family/Social/Home Environment: Social/Functional History:Patient is an 79-year-old  male who has admitted to [address] [place] [address] following a stay on acute for COVID. Patient and his spouse both were admitted to the hospital for Fuad. Patient and his spouse were living together in a private residence prior to hospitalization. Patient was independent with self-care, managing medications, finances,completing light housekeeping task, laundry, cooking and driving. Patient reports he would like to return home once he is able to discharge from [address] [place] [address]. Patient reports that he has a strong support system in place with adult children and other family members. Patient reports he and his wife have 2 daughters and one son, all who have remained in the area. SW anticipates patient will benefit from ongoing skilled therapies and medical equipment. SW will continue to monitor progress and maintain contact with patient and patient's family regarding length of stay and recommendations. SW will continue to assist with ongoing discharge planning. Lives With: Spouse  Type of Home: House  Home Layout: One level  Receives Help From: Family  ADL Assistance: Independent  Homemaking Assistance: Independent  Homemaking Responsibilities: Yes  Ambulation Assistance: Independent  Transfer Assistance: Independent  Active : Yes  Mode of Transportation: Car  Occupation: Retired  Contact/Guardian Information:  Temi Jones [phone]    Community Resources Utilized: None prior to hospital stay    Anticipated Needs/Discharge Plans: Anticipate that patient will benefit from ongoing skilled therapies and medical equipment upon discharge.      Discharge Planning:SW met with patient gave an overview of TCU,Team Conference/Team Conference schedule and this SW's role in discharge planning. SW will monitor progress and maintain contact with patient and patient's family regarding length of stay and recommendations. SW to follow and maintain involvement in discharge planning. Care plan reviewed with patient. Patient verbalized understanding of the plan of care and contributed to goal setting.     Living Arrangements: Spouse/Significant Other  Support Systems: Children, Family Members, Spouse/Significant Other  Potential Assistance Needed: N/A  Potential Assistance Purchasing Medications: No  Meds-to-Beds: Does the patient want to have any new prescriptions delivered to bedside prior to discharge?: No  Type of Home Care Services: None  Patient expects to be discharged to[de-identified] Home  Expected Discharge Date: (undetermined)  Follow Up Appointment: Best Day/Time : Monday AM      Grayson Birmingham 1/29/2021 3:04 PM

## 2021-01-29 NOTE — PROGRESS NOTES
Patient admitted to *8E70 from [facility],  Oriented to call light, room, and staff. Bed functions explained and demonstrated for patient. Patient requesting to have 2 siderails at the head of bed in the upright position to facilitate use of bed functions. Admission packet and patient rights provided, questions answered. Explained patients right to have family, representative or physician notified of their admission. Patient has declined for physician to be notified. Patient has declined  for family/representative to be notified. No needs expressed at this time. Two nurse skin assessment performed by Eliceo Miles RN  and  Kirby Lopez LPN    Admitting medication orders compared with acute stay medications; home medication list reviewed with patient/family. Medication issues identified no  Medication issue:    Influenza immunization history reviewed yes  Influenza (October-March) immunization offered not applicable patient had his flu vaccine this fall Patient declined. Order entered if requested; if declined, reason declined had flu vaccine this fall    Pneumococcal immunization history reviewed yes  Pneumococcal immunization offered  Yes Patient declined.  Order entered if requested; if declined, reason declined had the vaccine

## 2021-01-29 NOTE — PROGRESS NOTES
[facility]  INPATIENT PHYSICAL THERAPY  DAILY NOTE  [facility] CVICU 4B - 4B-05/005-A    Time In: 8221  Time Out: 6714  Timed Code Treatment Minutes: 40 Minutes  Minutes: 40          Date: 2021  Patient Name: Kyle Patel,  Gender:  male        MRN: [id number]  : 1940  (79 y.o.)     Referring Practitioner: Yusuf Rhoades MD  Diagnosis: Acute hypoxemic respiratory failure due to COVID-19  Additional Pertinent Hx: Per ED note, pt \"is a 79 y.o. male who presents to the Emergency Department for the evaluation of cough and shortness of breath. Patient reports gradual onset of symptoms over the past 5 days with significant worsening of the shortness of breath over the past 2 days. He reports unproductive cough associated with decreased sense of taste and smell and some nasal congestion over the past week. Denies any known sick contacts and has not been tested for Covid since symptom onset. He reports worsening of shortness of breath with exertion, improvement with rest.  Reports generalized weakness that occurs with activity. He has been taking Tylenol and was also started on vitamin C, vitamin D, zinc by his daughter earlier in the season. He denies any pulmonary history. He is not a smoker. He denies any associated fevers, chills, generalized body aches, headache, sore throat, vomiting, diarrhea, wheezing, edema, orthopnea or dizziness. \"     Prior Level of Function:  Lives With: Spouse  Type of Home: House  Home Layout: One level  Home Access: Stairs to enter with rails  Entrance Stairs - Number of Steps: 3-4 steps with 1 rail  Home Equipment: (none)   Bathroom Shower/Tub: Walk-in shower  Bathroom Toilet: Standard  Bathroom Equipment: Grab bars in shower  Bathroom Accessibility: Accessible    Receives Help From: Family  ADL Assistance: [address]: Independent  Homemaking Responsibilities: Yes  Ambulation Assistance: Independent  Transfer Assistance: Independent  Active : Yes  Additional Comments: Pt reports being Independent with all mobility in PLOF. Pt shared homemaking with his spouse prior to admission. Pt's spouse is also hospitalized with COVID-19 virus at this time. Restrictions/Precautions:  Restrictions/Precautions: General Precautions, Fall Risk, Isolation  Position Activity Restriction  Other position/activity restrictions: COVID+; 1/18 on BiPAP with settings 65%, PEEP 12; 1/19 on BiPAP and at 65%, PEEP 10; 1/20 high flow; 1/22; HFNC at 55 L and 80%--- HFNC 50LPM 38% O2 1/26 --- HFNC @ 45LPM 40% O2 1/27--- 3L nasal cannula 1/29     SUBJECTIVE: RN approved session. Pt in bed recliner upon arrival and agrees to therapy. Pt pleasant and cooperative. Pt on 3L nasal cannula, SpO2 >90% with therex (per respiratory order sheet- pts O2 goal >90%) did amb pt in hallway on 3L, SpO2 dropped to 80%, did bump pt up to 4L and pt recovered to 90% after 1.5-2 mins with cues for deep/pursed lip breathing. PAIN: 0/10: denies    OBJECTIVE:  Bed Mobility:  Not Tested    Transfers:  Sit to Stand: Stand By Assistance  Stand to Sit:Stand By Assistance    Ambulation:  Stand By Assistance  Distance: 50ft  Surface: Level Tile  Device:Rolling Walker  Gait Deviations: Forward Flexed Posture, Slow Emilia, Decreased Step Length Bilaterally, Lean to Left, Decreased Gait Speed, Decreased Heel Strike Bilaterally and Unsteady Gait  Pt reporting fatigue and some SOB with this dist. SpO2 dropped to 80% after gait on 3L    Exercise:  Patient was guided in 1 set(s) 10 reps of exercise to both lower extremities. Ankle pumps, Glut sets, Quad sets, Heelslides, Hip abduction/adduction, Straight leg raises, Seated marches, Long arc quads and hip abd, hip add, used green tband for resistance. Exercises were completed for increased independence with functional mobility.     Functional Outcome Measures: Completed  AM-PAC Inpatient Mobility Raw Score : 18  AM-PAC Inpatient T-Scale Score : 43.63    ASSESSMENT:  Assessment: Patient progressing toward established goals. Activity Tolerance:  Patient tolerance of  treatment: good. Pt tolerated session well however SpO2 drops with ambulation. Pt also fatigues quickly with activity. Pt will benefit from cont PT at this time to improve overall function and to return to PLOF to ensure safety at home. Equipment Recommendations: Other: monitor for needs - poss RW at discharge? Discharge Recommendations:  Continue to assess pending progress subacute skilled nursing facility (TCU)    Plan: Times per week: 4-5x GM  Current Treatment Recommendations: Strengthening, Gait Training, Patient/Caregiver Education & Training, Equipment Evaluation, Education, & procurement, Balance Training, Functional Mobility Training, Endurance Training, Transfer Training, Safety Education & Training, Home Exercise Program    Patient Education  Patient Education: Plan of Care, Transfers, Gait, Verbal Exercise Instruction, deep breathing    Goals:  Patient goals : go home  Short term goals  Time Frame for Short term goals: at discharge  Short term goal 1: Pt to be Mod I for supine <> sit to get in/out of bed  Short term goal 2: Pt to be Mod I for sit <> stand to get up to ambulate  Short term goal 3: Pt to ambulate > 60 ft with/without AD with Supervision for household distances  Long term goals  Time Frame for Long term goals : not set due to short ELOS    Following session, patient left in safe position with all fall risk precautions in place.

## 2021-01-29 NOTE — PROGRESS NOTES
RN called patient's daughter, Peg, to updated on plan of care and patient status. All questions answered and no further concerns stated by family.

## 2021-01-29 NOTE — DISCHARGE SUMMARY
Hospital Medicine Discharge Summary      Patient Identification:   Michelle Martin   : 1940  MRN: [id number]   Account: [de-identified]      Patient's PCP: Zack Stoner MD    Admit Date: 2021     Discharge Date:   21    Admitting Physician: Alicia Marcus MD     Discharge Physician: Lv Hsu MD     Discharge Diagnoses: Acute Hypoxic Resp. Failure 2/2 COVID-19 PNA; Hx of RCC s/p Right Nephrectomy    Active Hospital Problems    Diagnosis Date Noted    Essential hypertension [I10] 2021    Acute hypoxemic respiratory failure due to COVID-19 [facility]) [U07.1, J96.01] 2021       The patient was seen and examined on day of discharge and this discharge summary is in conjunction with any daily progress note from day of discharge. Hospital Course:   Michelle Martin is a 79 y.o. male admitted to [facility] on 2021 for sob, cough and fatigue. Pt tested + COVID-19 on 2021. Pt found to be hypoxic initially in ED at 67% on RA. Has no prior lung history. Pt admitted on HFNC. Started on treatment for COVID-19, and completed 2U plasma, 5 days of IV Remdisivir, 7 days of IV ABx, and 12 days of Decadron. Pt went from HFNC, to BiPAP, back to HFNC and now down to 4L NC. Pt doing great, working with PT. Tolerating PO intake. HD stable, no fevers. Will discharge to TCU. Assessment/Plan:     1. Acute hypoxic respiratory failure due to Covid-19 PNA: on HFO fiO2 95% at 60 lpm w/ SaO2 90%. On Dexamethasone (added PPI), Remdesivir, zinc, vitamin C, vitamin D, aspirin, LMWH BID dosing, s/p convalescent plasma; procal elevated; on Azirtho/ceftriaxone for possible superimposed bacterial PNA, prothrombotic levels elevated; CTPE negative; added ASA 81 QD. IS/Acapella/PT to see. Wean supplemental O2 as tolerated to SpO2 > 90%     : improving slowly. Slowly weaning; CCM. Chest physio ordered  : pt still on HFNC, cont current management.  Will give Plasma today (2U), cont Decadron (Day 4), Remdisivr (Day 3), Vitamin C, D, Zinc, ASA 81, Lovenox BID, Acapella and IS.   1/21: went from BiPAP to HFNC today. Will cont current management. Will give Plasma today (2U), cont Decadron (Day 5), Remdisivr (Day 4), IV ABx (Day 5), Vitamin C, D, Zinc, ASA 81, Lovenox BID, Acapella and IS.   1/22: pt desaturated to 70's on HFNC, placed back on BiPAP. CT Chest ordered and showing improved aeration however worsening effusions. Will give IV lasix 40 x 1 dose, given Plasma (2U), cont Decadron (Day 6), Remdisivr (Day 5), IV ABx (Day 6), Vitamin C, D, Zinc, ASA 81, Lovenox BID, Acapella and IS.   1/23: pt on HFNC doing well, 70% with 60 L/Min. Responded well to IV lasix, will give another IV 40 x 1 today. Given Plasma (2U), cont Decadron (Day 7), Remdisivr (Day 5), IV ABx (Day 7), Vitamin C, D, Zinc, ASA 81, Lovenox BID, Acapella and IS.   1/24: pt on HFNC doing well, 50% with 60 L/Min. Given two doses of IV lasix 40 and responded well. Given Plasma (2U), cont Decadron (Day 8), Remdisivr (Day 5), IV ABx (Day 7), Vitamin C, D, Zinc, ASA 81, Lovenox BID, Acapella and IS.   1/25: pt still on HFNC on same settings as yesterday. Given Plasma (2U), cont Decadron (Day 9), completed 5 days of IV Remdisivr and 7 days of IV Abx, Vitamin C, D, Zinc, ASA 81, Lovenox BID, Acapella and IS.   1/26: pt still on HFNC, 50% and 50 L/Min. Given Plasma (2U), cont Decadron (Day 10), completed 5 days of IV Remdisivr and 7 days of IV Abx, Vitamin C, D, Zinc, ASA 81, Lovenox BID, Acapella and IS.   1/27: weaning down on HFNC, 40% and 40 L/Min. Given Plasma (2U), cont Decadron (Day 11), completed 5 days of IV Remdisivr and 7 days of IV Abx, Vitamin C, D, Zinc, ASA 81, Lovenox BID, Acapella and IS.    1/28: weaned off of HFNC, down to 5L NC Given Plasma (2U), completed 12 days of PO Decadron, completed 5 days of IV Remdisivr and 7 days of IV Abx, Vitamin C, D, Zinc, ASA 81, Lovenox BID, Acapella and IS. significantly improved from prior examination dated 1/20/2021.   2. Minimal right basilar pleural effusion is seen. **This report has been created using voice recognition software. It may contain minor errors which are inherent in voice recognition technology. **      Final report electronically signed by Dr. Temo Gresham on 1/25/2021 4:00 PM      CT CHEST WO CONTRAST   Final Result   1. Stable left lung infiltrates   2. Slight improved aeration of the right lower lobe. 3. Slight increase in size of bilateral small pleural effusions. **This report has been created using voice recognition software. It may contain minor errors which are inherent in voice recognition technology. **      Final report electronically signed by Dr. Lisa Dunne on 1/22/2021 11:37 AM      XR CHEST 1 VIEW   Final Result   Worsening airspace infiltrates worsening appearance of the chest.            **This report has been created using voice recognition software. It may contain minor errors which are inherent in voice recognition technology. **      Final report electronically signed by Dr. Lisa Dunne on 1/20/2021 10:21 AM      CTA CHEST W WO CONTRAST   Final Result   Impression:   1. No pulmonary emboli. 2.  No thoracic aortic aneurysm or dissection   3. Bilateral perihilar and peripheral airspace disease which may    represent pulmonary edema versus atypical viral pneumonia. 4.  Small bilateral pleural fluid collections   5. Multiple hepatic hypodensities are indeterminate and may represent    cysts, hemangiomas or metastatic disease.   Nonemergent multiphase imaging    of the entire liver can be performed for further evaluation      This document has been electronically signed by: Lei Woo MD on    01/17/2021 09:04 PM      All CT scans at this facility use dose modulation, iterative    reconstruction, and/or weight-based   dosing when appropriate to reduce radiation dose to as low as reasonably achievable. XR CHEST PORTABLE   Final Result   Impression:   1. Perihilar opacities may represent pulmonary edema versus atypical    pneumonia. This document has been electronically signed by: Brooke Jimenez MD on    01/17/2021 09:03 PM                Consults:     [address] TO REHAB/TCU ADMISSION COORDINATOR  IP CONSULT TO SOCIAL WORK  IP CONSULT TO REHAB/TCU ADMISSION COORDINATOR  IP CONSULT TO DIETITIAN  IP CONSULT TO RECREATION THERAPY  IP CONSULT TO SOCIAL WORK    Disposition:    [] Home       [x] TCU       [] Rehab       [] Psych       [] SNF       [] [place]       [] Other-    Condition at Discharge: Stable    Code Status:  Full Code     Patient Instructions: Activity: activity as tolerated  Diet: DIET GENERAL;  Dietary Nutrition Supplements: Standard High Calorie Oral Supplement      Follow-up visits:   No follow-up provider specified. Discharge Medications:      [facility] Medication Instructions HNB:[id number]    Printed on:01/29/21 8950   Medication Information                      amLODIPine (NORVASC) 5 MG tablet  Take 5 mg by mouth daily             losartan (COZAAR) 100 MG tablet  Take 100 mg by mouth daily             Multiple Vitamins-Minerals (ICAPS AREDS 2 PO)  Take by mouth daily                 Time Spent on discharge is more than 30 minutes in the examination, evaluation, counseling and review of medications and discharge plan. Signed: Thank you Colin Wolf MD for the opportunity to be involved in this patient's care.     Electronically signed by Vesna Arriaga MD on 1/29/2021 at 9:42 AM

## 2021-01-30 PROCEDURE — 6360000002 HC RX W HCPCS: Performed by: INTERNAL MEDICINE

## 2021-01-30 PROCEDURE — 6370000000 HC RX 637 (ALT 250 FOR IP): Performed by: INTERNAL MEDICINE

## 2021-01-30 PROCEDURE — 97530 THERAPEUTIC ACTIVITIES: CPT

## 2021-01-30 PROCEDURE — 97116 GAIT TRAINING THERAPY: CPT

## 2021-01-30 PROCEDURE — 97162 PT EVAL MOD COMPLEX 30 MIN: CPT

## 2021-01-30 PROCEDURE — 1290000000 HC SEMI PRIVATE OTHER R&B

## 2021-01-30 PROCEDURE — 97110 THERAPEUTIC EXERCISES: CPT

## 2021-01-30 RX ADMIN — OXYCODONE HYDROCHLORIDE AND ACETAMINOPHEN 500 MG: 500 TABLET ORAL at 08:50

## 2021-01-30 RX ADMIN — LOSARTAN POTASSIUM 100 MG: 100 TABLET, FILM COATED ORAL at 08:50

## 2021-01-30 RX ADMIN — Medication 1000 UNITS: at 08:50

## 2021-01-30 RX ADMIN — PANTOPRAZOLE SODIUM 40 MG: 40 TABLET, DELAYED RELEASE ORAL at 06:05

## 2021-01-30 RX ADMIN — AMLODIPINE BESYLATE 5 MG: 5 TABLET ORAL at 08:50

## 2021-01-30 RX ADMIN — ENOXAPARIN SODIUM 40 MG: 40 INJECTION SUBCUTANEOUS at 20:46

## 2021-01-30 RX ADMIN — Medication 50 MG: at 08:50

## 2021-01-30 RX ADMIN — ASPIRIN 81 MG: 81 TABLET, CHEWABLE ORAL at 08:50

## 2021-01-30 RX ADMIN — ENOXAPARIN SODIUM 40 MG: 40 INJECTION SUBCUTANEOUS at 08:50

## 2021-01-30 ASSESSMENT — PAIN SCALES - GENERAL
PAINLEVEL_OUTOF10: 0
PAINLEVEL_OUTOF10: 0

## 2021-01-30 NOTE — H&P
TCU History and Physical      Chief Complaint and Reason for TCU Admission:   The need to continue the time with therapies    History of Present Illness:  Palmer Soulier  is a 79 y.o. male admitted to the transitional care unit on 1/29/2021. He was found to have covid 19 and when the sob and weakness became too great he required admission. He was treated in the standard way and eventually became strong enough to be discharged to [facility]. He requires more time with therapies in order to be stronger and safer at home. Past Medical History:      Diagnosis Date    Arthritis     Cancer [facility]) 1977    kidney, right - surgery & radiation    Cancer ([facility] [address]     skin    Hypertension        Primary care provider: Martha Ordaz MD     Past Surgical History:      Procedure Laterality Date    ABDOMEN SURGERY Bilateral 12/12/2018    LESION LEFT FLANK AND RIGHT UPPER LATERAL ABDOMEN performed by Sudarshan Vuong MD at [address] [facility]      last one 2013    EAR SURGERY Right 4/26/2019    MOHS REPAIR SCC RIGHT HELICAL RIM WITH FULL THICKNESS SKIN GRAFT performed by Sudarshan Vuong MD at [address] [address]    right    MOHS SURGERY  03/16/2018    MOHS repair SCC at apex of scalp    MOHS SURGERY N/A 03/21/2018    Mohs repair of SCC of apex scalp    MOHS SURGERY  03/23/2018    Closure of MOHS of scalp    [facility] OFFICE/OUTPT VISIT,PROCEDURE ONLY N/A 3/16/2018    SCC OF APEX OF SCALP radical removal of bone performed by Sudarshan Vuong MD at [address] OFFICE/OUTPT VISIT,PROCEDURE ONLY N/A 3/21/2018    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     Current Medications:    Current Facility-Administered Medications: acetaminophen (TYLENOL) tablet 650 mg, 650 mg, Oral, Q6H PRN **OR** acetaminophen (TYLENOL) suppository 650 mg, 650 mg, Rectal, Q6H PRN  [START ON 1/30/2021] enoxaparin (LOVENOX) injection 40 mg, 40 mg, Subcutaneous, Q12H  polyethylene glycol (GLYCOLAX) packet 17 g, 17 g, Oral, Daily PRN  [START ON 1/30/2021] amLODIPine (NORVASC) tablet 5 mg, 5 mg, Oral, Daily  [START ON 1/30/2021] ascorbic acid (VITAMIN C) tablet 500 mg, 500 mg, Oral, Daily  [START ON 1/30/2021] aspirin chewable tablet 81 mg, 81 mg, Oral, Daily  [START ON 1/30/2021] losartan (COZAAR) tablet 100 mg, 100 mg, Oral, Daily  [START ON 1/30/2021] pantoprazole (PROTONIX) tablet 40 mg, 40 mg, Oral, QAM AC  [START ON 1/31/2021] ppd (tuberculin skin test) read, , Does not apply, Weekly  tuberculin injection 5 Units, 5 Units, Intradermal, Weekly  [START ON 1/30/2021] Vitamin D (CHOLECALCIFEROL) tablet 1,000 Units, 1,000 Units, Oral, Daily  [START ON 1/30/2021] zinc sulfate (ZINCATE) capsule 50 mg, 50 mg, Oral, Daily     Resident is taking an antipsychotic medication? No     If yes, was Gradual Dose Reduction (GDR) attempted? NA     No- GDR is clinically contraindicated due to the fact that tapering the medication  would not achieve the desired therapeutic effects and the current dose is  necessary to maintain or improve the resident's function, well-being, safety, and  quality of life.     Social History:  Social History     Socioeconomic History    Marital status:      Spouse name: Corinna Saskia Number of children: 3    Years of education: Not on file    Highest education level: Not on file   Occupational History    Not on file   Social Needs    Financial resource strain: Not on file    Food insecurity     Worry: Not on file     Inability: Not on file   [employer] needs     Medical: Not on file     Non-medical: Not on file   Tobacco Use    Smoking status: Never Smoker    Smokeless tobacco: Never Used Substance and Sexual Activity    Alcohol use: Yes     Comment: occasional 3-4 drinks per week    Drug use: No    Sexual activity: Not Currently   Lifestyle    Physical activity     Days per week: Not on file     Minutes per session: Not on file    Stress: Not on file   Relationships    Social connections     Talks on phone: Not on file     Gets together: Not on file     Attends Yazidism service: Not on file     Active member of club or organization: Not on file     Attends meetings of clubs or organizations: Not on file     Relationship status: Not on file    Intimate partner violence     Fear of current or ex partner: Not on file     Emotionally abused: Not on file     Physically abused: Not on file     Forced sexual activity: Not on file   Other Topics Concern    Not on file   Social History Narrative    Not on file           Family History:       Problem Relation Age of Onset    High Blood Pressure Mother     High Blood Pressure Father     Stroke Father     Diabetes Son     Cancer Neg Hx     Heart Disease Neg Hx        Review of Systems:  CONSTITUTIONAL:  positive for  fatigue  EYES:  positive for  glasses  HEENT:  negative for  sore throat  RESPIRATORY:  negative for  hemoptysis  CARDIOVASCULAR:  negative for  chest pain  GASTROINTESTINAL:  negative for pruritus  GENITOURINARY:  negative for hematuria  SKIN:  negative  HEMATOLOGIC/LYMPHATIC:  negative for petechiae  MUSCULOSKELETAL:  positive for  myalgias, arthralgias, stiff joints and muscle weakness  NEUROLOGICAL:  negative for tremor  BEHAVIOR/PSYCH:  negative for increased agitation  10 point system review otherwise negative    Physical Exam:  BP (!) 124/58   Pulse 78   Temp 97.2 °F (36.2 °C) (Oral)   Resp 18   SpO2 90%   Well developed well nourished white male who is awake alert and cooperative  Skin atrophic  Membranes moist  Head normocephalic  Neck without mass  Chest symmetrical expansion  Heart S1S2 without murmur  Lungs CTA  Abd soft, non tender, normoactive BS and no mass  Ext without edema  Neuro weak  Psy pleasant        Diagnostics:  Recent Results (from the past 24 hour(s))   C-reactive protein    Collection Time: 01/29/21  5:12 AM   Result Value Ref Range    CRP 0.87 0.00 - 1.00 mg/dl   Ferritin    Collection Time: 01/29/21  5:12 AM   Result Value Ref Range    Ferritin 2,259 (H) 22 - 322 ng/mL   D-Dimer, Quantitative    Collection Time: 01/29/21  5:12 AM   Result Value Ref Range    D-Dimer, Quant 871.00 (H) 0.00 - 500.00 ng/ml FEU       Impression:  Active Hospital Problems    Diagnosis Date Noted    Pneumonia due to COVID-19 virus [U07.1, J12.82] 01/29/2021    Muscular deconditioning [R29.898] 01/29/2021    Essential hypertension [I10] 01/18/2021   ·      Plan:   · The patient demonstrates good potential to participate in a skilled rehabilitation program on the transitional care unit. Rehabilitation services will include PT and OT/RT in order to improve functional status prior to discharge. Family education and training will be completed. Equipment evaluations and recommendations will be completed as appropriate. · Nursing will be involved for bowel, bladder, skin, and pain management. Nursing will also provide education and training to patient and family. · Prophylaxis:  DVT: lovenox. GI: protonix. · Pain: tylenol  · Nutrition:  Consultation to dietician for nutritional counseling and recommendations.    · Bladder: continent  · Bowel: glycolax  ·  and case management consultations for coordination of care and discharge Waynard Schirmer, MD

## 2021-01-30 NOTE — PLAN OF CARE
Problem: Breathing Pattern - Ineffective  Goal: Ability to achieve and maintain a regular respiratory rate  Outcome: Ongoing  Note: Respirations easy unlabored      Problem: Body Temperature -  Risk of, Imbalanced  Goal: Ability to maintain a body temperature within defined limits  Outcome: Ongoing  Note: No fever , oral temp 97.0     Problem: Body Temperature -  Risk of, Imbalanced  Goal: Will regain or maintain usual level of consciousness  Outcome: Ongoing  Note: Alert and oriented x4     Problem: Gas Exchange - Impaired  Goal: Absence of hypoxia  Note: Nasal cannula 3.5liters , 95%.  Lung sounds diminshed, encouraged use of incentive

## 2021-01-30 NOTE — PROGRESS NOTES
[address] [address]  INPATIENT PHYSICAL THERAPY  EVALUATION  [facility] SKILLED NURSING UNIT - [id number]    Time In: 1512  Time Out: 1615  Timed Code Treatment Minutes: 39 Minutes  Minutes: 63          Date: 2021  Patient Name: Nick Recio,  Gender:  male        MRN: [id number]  : 1940  (79 y.o.)      Referring Practitioner: Ordering: Luke Gonsales MD; Attending: Noreen Mchugh MD     Additional Pertinent Hx: Per ED note, pt \"is a 79 y.o. male who presents to the Emergency Department for the evaluation of cough and shortness of breath. Patient reports gradual onset of symptoms over the past 5 days with significant worsening of the shortness of breath over the past 2 days. He reports unproductive cough associated with decreased sense of taste and smell and some nasal congestion over the past week. Denies any known sick contacts and has not been tested for Covid since symptom onset. He reports worsening of shortness of breath with exertion, improvement with rest.  Reports generalized weakness that occurs with activity. He has been taking Tylenol and was also started on vitamin C, vitamin D, zinc by his daughter earlier in the season. He denies any pulmonary history. He is not a smoker. He denies any associated fevers, chills, generalized body aches, headache, sore throat, vomiting, diarrhea, wheezing, edema, orthopnea or dizziness. \"     Restrictions/Precautions:  Restrictions/Precautions: General Precautions, Fall Risk, Isolation  Position Activity Restriction  Other position/activity restrictions:  O2 on 3L    Subjective:  Chart Reviewed: Yes  Patient assessed for rehabilitation services?: Yes  Subjective: Pt sitting up in recliner in in his wife's room. Pt agrees to therapy.     General:  Overall Orientation Status: Within Functional Limits    Vision: Impaired  Vision Exceptions: Wears glasses for reading    Hearing: Within functional limits         Pain: denies Social/Functional History:    Lives With: Spouse  Type of Home: House  Home Layout: One level  Home Access: Stairs to enter with rails  Entrance Stairs - Number of Steps: 3-4 steps with 1 rail  Home Equipment: (none)     Bathroom Shower/Tub: Walk-in shower  Bathroom Toilet: Standard  Bathroom Equipment: Grab bars in shower  Bathroom Accessibility: Accessible    Receives Help From: Family  ADL Assistance: [address]: Independent  Homemaking Responsibilities: Yes  Ambulation Assistance: Independent  Transfer Assistance: Independent    Active : Yes  Mode of Transportation: Car  Occupation: Retired  Additional Comments: Pt reports being [address] with all mobility in [facility]. Pt shared homemaking with his spouse prior to admission. Pt's spouse is also hospitalized on this same unit due to COVID-19 virus at this time. OBJECTIVE:  Range of Motion:  Bilateral Lower Extremity: WFL    Strength:  Bilateral Lower Extremity: grossly 4/5    Balance:  Static Sitting Balance:  Modified Independent  Dynamic Sitting Balance: Supervision  Static Standing Balance: Stand By Assistance  Dynamic Standing Balance: Contact Guard Assistance     Pt able to reach to floor to retrieve washcloth with 1 UE support on walker and Light CGA. Bed Mobility:  Rolling to Left: Supervision   Rolling to Right: Supervision   Supine to Sit: Stand By Assistance  Sit to Supine: Stand By Assistance     Transfers:  Sit to Stand: Stand By Assistance, [employer], cues for hand placement  Stand to Sit:Stand By Assistance  Stand Pivot:Contact Guard Assistance    Ambulation:  Stand By Assistance, Contact Guard Assistance  Distance: 70 ft, 22 ft x 4, and 90 ft  Surface: Level Tile and Uneven Surface  Device:Rolling Walker  Gait Deviations:   Forward Flexed Posture, Decreased Step Length Bilaterally, Decreased Gait Speed, Decreased Heel Strike Bilaterally and Narrow Base of Support    Stairs:  Contact Guard Assistance  Number of Steps: 4  Height: 6\" step with Bilateral Handrails      Exercise:  Patient was guided in 1 set(s) 10 reps of exercise to both lower extremities. Ankle pumps, Glut sets, Quad sets, Heelslides, Hip abduction/adduction, Seated marches, Seated heel/toe raises and Long arc quads. Exercises were completed for increased independence with functional mobility. Functional Outcome Measures: Completed      Timed up and Go Test (TUG)  32 seconds       Normative Reference Values  60-69 years:  8.1 seconds  70-79 years: 9.2 seconds  80-99 years: 11.3 seconds    < 10 seconds is normal, a score of > 14 seconds indicates high fall risk      ASSESSMENT:  Activity Tolerance:  Patient tolerance of  treatment: good. Pt's O2 saturations were >88% throughout with pt wearing 3 L/min throughout. Pt also given multiple rest breaks thorughout session. Treatment Initiated: Treatment and education initiated within context of evaluation. Evaluation time included review of current medical information, gathering information related to past medical, social and functional history, completion of standardized testing, formal and informal observation of tasks, assessment of data and development of plan of care and goals. Treatment time included skilled education and facilitation of tasks to increase safety and independence with functional mobility for improved independence and quality of life. Assessment: Body structures, Functions, Activity limitations: Decreased functional mobility , Decreased endurance, Decreased strength  Assessment: Pt is a pleasant 79 y.o. male that is motivated to get moving well enough to go home again. Pt recognizes that he moves better with use of RW. Pt is at light CGA to SBA for mobility with use of RW. Pt was Independent with no AD in PLOF. Pt is also requiring O2 at 3 L/min at this time.  Pt would benefit from continued skilled PT to address strengthening, endurance building, and functional mobility training. Prognosis: Good         Discharge Recommendations:  Discharge Recommendations: Continue to assess pending progress    Patient Education:  PT Education: Goals, PT Role, Plan of Care, Home Exercise Program    Equipment Recommendations: Other: monitor for needs - poss RW at discharge? Plan:  Times per week: 6x  Current Treatment Recommendations: Strengthening, Gait Training, Patient/Caregiver Education & Training, Equipment Evaluation, Education, & procurement, Balance Training, Functional Mobility Training, Endurance Training, Transfer Training, Safety Education & Training, Home Exercise Program, Stair training    Goals:  Patient goals : go home  Short term goals  Time Frame for Short term goals: 1 week  Short term goal 1: Pt to be Mod I for supine <> sit to get in/out of bed  Short term goal 2: Pt to be Supervision for sit <> stand to get up to ambulate  Short term goal 3: Pt to ambulate > 60 ft with RW/4WW with Supervision for household distances  Short term goal 4: Pt to negotiate 4 steps with 1-2 rails with SBA for home access  Long term goals  Time Frame for Long term goals : 3 weeks  Long term goal 1: Pt to be Mod I for supine <> sit without rail to get in/out of bed  Long term goal 2: Pt to be Mod I for sit <> stand to get up to ambulate  Long term goal 3: Pt to ambulate > 150 ft with RW/4WW with Mod I for household and community distances  Long term goal 4: Pt to negotiate 4 steps with 1 rail with Supervision for home access    Following session, patient left in safe position with all fall risk precautions in place. Dashawn Martinez.  Tavia Downing, [place] [address]

## 2021-01-30 NOTE — PLAN OF CARE
Problem: Airway Clearance - Ineffective  Goal: Achieve or maintain patent airway  Outcome: Ongoing  Note: 02 3.5 liters nasal cannula, cough and deep breathe      Problem: Breathing Pattern - Ineffective  Goal: Ability to achieve and maintain a regular respiratory rate  Outcome: Ongoing  Note: Respirations easy unlabored      Problem: Body Temperature -  Risk of, Imbalanced  Goal: Ability to maintain a body temperature within defined limits  Outcome: Ongoing  Note: No fever , oral temp 97.0     Problem: Body Temperature -  Risk of, Imbalanced  Goal: Will regain or maintain usual level of consciousness  Outcome: Ongoing  Note: Alert and oriented x4     Problem: Falls - Risk of:  Goal: Will remain free from falls  Description: Will remain free from falls  Outcome: Ongoing  Note: Fall precautions in place,up with assist and walker, bed and chair alarm in use      Problem: Falls - Risk of:  Goal: Absence of physical injury  Description: Absence of physical injury  Outcome: Ongoing     Problem: Pain:  Goal: Pain level will decrease  Description: Pain level will decrease  Outcome: Ongoing  Note: No pain at this time      Problem: Mobility - Impaired:  Goal: Mobility will improve  Description: Mobility will improve  Outcome: Ongoing  Note: Continue to work with therapy      Problem: Skin Integrity:  Goal: Skin integrity will stabilize  Description: Skin integrity will stabilize  Outcome: Ongoing  Note: Waffle cushion In chair, turn and reposition     Problem: Discharge Planning:  Goal: Patients continuum of care needs are met  Description: Patients continuum of care needs are met  Outcome: Ongoing  Note: Continue to work with therapy toward discharge goals      Problem: Gas Exchange - Impaired  Goal: Absence of hypoxia  Note: Nasal cannula 3.5liters , 95%.  Lung sounds diminshed, encouraged use of incentive

## 2021-01-31 PROCEDURE — 6360000002 HC RX W HCPCS: Performed by: INTERNAL MEDICINE

## 2021-01-31 PROCEDURE — 97110 THERAPEUTIC EXERCISES: CPT

## 2021-01-31 PROCEDURE — 97166 OT EVAL MOD COMPLEX 45 MIN: CPT

## 2021-01-31 PROCEDURE — 1290000000 HC SEMI PRIVATE OTHER R&B

## 2021-01-31 PROCEDURE — 97535 SELF CARE MNGMENT TRAINING: CPT

## 2021-01-31 PROCEDURE — 6370000000 HC RX 637 (ALT 250 FOR IP): Performed by: INTERNAL MEDICINE

## 2021-01-31 RX ADMIN — Medication 1000 UNITS: at 09:40

## 2021-01-31 RX ADMIN — LOSARTAN POTASSIUM 100 MG: 100 TABLET, FILM COATED ORAL at 09:40

## 2021-01-31 RX ADMIN — OXYCODONE HYDROCHLORIDE AND ACETAMINOPHEN 500 MG: 500 TABLET ORAL at 09:40

## 2021-01-31 RX ADMIN — AMLODIPINE BESYLATE 5 MG: 5 TABLET ORAL at 09:40

## 2021-01-31 RX ADMIN — Medication 50 MG: at 09:40

## 2021-01-31 RX ADMIN — PANTOPRAZOLE SODIUM 40 MG: 40 TABLET, DELAYED RELEASE ORAL at 06:17

## 2021-01-31 RX ADMIN — ENOXAPARIN SODIUM 40 MG: 40 INJECTION SUBCUTANEOUS at 21:03

## 2021-01-31 RX ADMIN — ASPIRIN 81 MG: 81 TABLET, CHEWABLE ORAL at 09:40

## 2021-01-31 RX ADMIN — ENOXAPARIN SODIUM 40 MG: 40 INJECTION SUBCUTANEOUS at 09:40

## 2021-01-31 NOTE — PLAN OF CARE
Problem: Airway Clearance - Ineffective  Goal: Achieve or maintain patent airway  1/30/2021 2215 by Natalie Carpenter LPN  Outcome: Ongoing  1/30/2021 1055 by Gurinder Larry RN  Outcome: Ongoing  Note: 02 3.5 liters nasal cannula, cough and deep breathe      Problem: Gas Exchange - Impaired  Goal: Absence of hypoxia  1/30/2021 2215 by Natalie Carpenter LPN  Outcome: Ongoing  1/30/2021 1055 by Gurinder Larry RN  Note: Nasal cannula 3.5liters , 95%. Lung sounds diminshed, encouraged use of incentive   Goal: Promote optimal lung function  Outcome: Ongoing     Problem: Breathing Pattern - Ineffective  Goal: Ability to achieve and maintain a regular respiratory rate  1/30/2021 2215 by Natalie Carpenter LPN  Outcome: Ongoing  1/30/2021 1055 by Gurinder Larry RN  Outcome: Ongoing  Note: Respirations easy unlabored      Problem:  Body Temperature -  Risk of, Imbalanced  Goal: Ability to maintain a body temperature within defined limits  1/30/2021 2215 by Natalie Carpenter LPN  Outcome: Ongoing  1/30/2021 1055 by Gurinder Larry RN  Outcome: Ongoing  Note: No fever , oral temp 97.0  Goal: Will regain or maintain usual level of consciousness  1/30/2021 2215 by Natalie Carpenter LPN  Outcome: Ongoing  1/30/2021 1055 by Gurinder Larry RN  Outcome: Ongoing  Note: Alert and oriented x4  Goal: Complications related to the disease process, condition or treatment will be avoided or minimized  Outcome: Ongoing     Problem: Isolation Precautions - Risk of Spread of Infection  Goal: Prevent transmission of infection  Outcome: Ongoing     Problem: Nutrition Deficits  Goal: Optimize nutritional status  Outcome: Ongoing     Problem: Risk for Fluid Volume Deficit  Goal: Maintain normal heart rhythm  Outcome: Ongoing  Goal: Maintain absence of muscle cramping  Outcome: Ongoing  Goal: Maintain normal serum potassium, sodium, calcium, phosphorus, and pH  Outcome: Ongoing     Problem: Loneliness or Risk for Loneliness  Goal: Demonstrate positive use of time alone when socialization is not possible  Outcome: Ongoing     Problem: Fatigue  Goal: Verbalize increase energy and improved vitality  Outcome: Ongoing     Problem: Patient Education: Go to Patient Education Activity  Goal: Patient/Family Education  Outcome: Ongoing     Problem: Falls - Risk of:  Goal: Will remain free from falls  Description: Will remain free from falls  1/30/2021 2215 by Urban Davidson LPN  Outcome: Ongoing  1/30/2021 1111 by Sha Gould RN  Outcome: Ongoing  Note: Fall precautions in place,up with assist and walker, bed and chair alarm in use   Goal: Absence of physical injury  Description: Absence of physical injury  1/30/2021 2215 by Urban Davidson LPN  Outcome: Ongoing  1/30/2021 1111 by Sha Gould RN  Outcome: Ongoing     Problem: Pain:  Goal: Pain level will decrease  Description: Pain level will decrease  1/30/2021 2215 by Urban Davidson LPN  Outcome: Ongoing  1/30/2021 1111 by Sha Gould RN  Outcome: Ongoing  Note: No pain at this time   Goal: Control of acute pain  Description: Control of acute pain  Outcome: Ongoing  Goal: Control of chronic pain  Description: Control of chronic pain  Outcome: Ongoing     Problem: Bleeding:  Goal: Will show no signs and symptoms of excessive bleeding  Description: Will show no signs and symptoms of excessive bleeding  1/30/2021 2215 by Urban Davidson LPN  Outcome: Ongoing  1/30/2021 1111 by Sha Gould RN  Outcome: Ongoing  Note: No active bleeding , lovenox     Problem: Mobility - Impaired:  Goal: Mobility will improve  Description: Mobility will improve  1/30/2021 2215 by Urban Davidson LPN  Outcome: Ongoing  1/30/2021 1111 by Sha Gould RN  Outcome: Ongoing  Note: Continue to work with therapy      Problem: Skin Integrity:  Goal: Skin integrity will stabilize  Description: Skin integrity will stabilize  1/30/2021 2215 by Urban Davidson LPN  Outcome: Ongoing  1/30/2021 1111 by Amrit Ocampo Rashad Sanchez RN  Outcome: Ongoing  Note: Waffle cushion In chair, turn and reposition     Problem: Discharge Planning:  Goal: Patients continuum of care needs are met  Description: Patients continuum of care needs are met  1/30/2021 2215 by Duran Alanis LPN  Outcome: Ongoing  1/30/2021 1111 by Benjamin Eaton RN  Outcome: Ongoing  Note: Continue to work with therapy toward discharge goals      Problem: Skin Integrity:  Goal: Will show no infection signs and symptoms  Description: Will show no infection signs and symptoms  Outcome: Ongoing  Goal: Absence of new skin breakdown  Description: Absence of new skin breakdown  Outcome: Ongoing     Problem: IP MOBILITY  Goal: LTG - patient will demonstrate safe mobility requirements  Outcome: Ongoing

## 2021-02-01 PROCEDURE — 1290000000 HC SEMI PRIVATE OTHER R&B

## 2021-02-01 PROCEDURE — 6360000002 HC RX W HCPCS: Performed by: INTERNAL MEDICINE

## 2021-02-01 PROCEDURE — 97110 THERAPEUTIC EXERCISES: CPT

## 2021-02-01 PROCEDURE — 97530 THERAPEUTIC ACTIVITIES: CPT

## 2021-02-01 PROCEDURE — 0220000000 HC SKILLED NURSING FACILITY

## 2021-02-01 PROCEDURE — 97116 GAIT TRAINING THERAPY: CPT

## 2021-02-01 PROCEDURE — 6360000002 HC RX W HCPCS: Performed by: FAMILY MEDICINE

## 2021-02-01 PROCEDURE — 6370000000 HC RX 637 (ALT 250 FOR IP): Performed by: INTERNAL MEDICINE

## 2021-02-01 RX ADMIN — ENOXAPARIN SODIUM 40 MG: 40 INJECTION SUBCUTANEOUS at 09:16

## 2021-02-01 RX ADMIN — OXYCODONE HYDROCHLORIDE AND ACETAMINOPHEN 500 MG: 500 TABLET ORAL at 09:16

## 2021-02-01 RX ADMIN — LOSARTAN POTASSIUM 100 MG: 100 TABLET, FILM COATED ORAL at 09:16

## 2021-02-01 RX ADMIN — Medication 1000 UNITS: at 09:16

## 2021-02-01 RX ADMIN — ENOXAPARIN SODIUM 30 MG: 30 INJECTION SUBCUTANEOUS at 20:13

## 2021-02-01 RX ADMIN — PANTOPRAZOLE SODIUM 40 MG: 40 TABLET, DELAYED RELEASE ORAL at 05:32

## 2021-02-01 RX ADMIN — Medication 50 MG: at 09:16

## 2021-02-01 RX ADMIN — AMLODIPINE BESYLATE 5 MG: 5 TABLET ORAL at 09:16

## 2021-02-01 RX ADMIN — ASPIRIN 81 MG: 81 TABLET, CHEWABLE ORAL at 09:16

## 2021-02-01 NOTE — FLOWSHEET NOTE
Patient was finishing dinner with his wife of 58 years, Dennis Angeles is in the room next to his on 8E. They both expressed gratitude for the care they have received and are grateful they are getting better. Patient believes they have HCPOAs and will have a family member drop them off.          02/01/21 1815   Encounter Summary   Services provided to: Patient;Significant other   Referral/Consult From: Rounding   Support System Spouse; Children;Family members   Continue Visiting Yes  (2/1 p)   Complexity of Encounter Moderate   Length of Encounter 15 minutes   Advance Care Planning Yes   Spiritual/Latter-day   Type Spiritual support   Assessment Approachable   Intervention Active listening;Explored feelings, thoughts, concerns;Explored coping resources;Sustaining presence/ Ministry of presence; Discussed illness/injury and it's impact   Outcome Comfort;Expressed gratitude;Engaged in [employer] [facility])   Healthcare Directive Yes, patient has an advance directive for healthcare treatment   Type of Healthcare Directive Durable power of  for health care   Copy in Chart No, copy requested from family   [address] Agent's Name Jose Hernandez

## 2021-02-01 NOTE — PATIENT CARE CONFERENCE
[facility]  Transitional Care Unit  Team Conference Note    Patient Name: Fatemeh Cedillo   MRN: [id number]    : 1940  (79 y.o.)  Gender: male   Referring Practitioner: Ordering: Sameer Saucedo MD; Attending: Eric Saavedra MD       Team Conference Date: 2021   Admission Date:     1:05 PM  Re-Certification Date: 2640    :  Tentative Discharge Plan and current psychosocial issues:Patient is an 53-year-old  male who has admitted to [facility] following a stay on [facility] for [facility]. Patient and his spouse both were admitted to the hospital for [facility]. Patient and his spouse were living together in a private residence prior to hospitalization. Patient was independent with self-care, managing medications, finances,completing light housekeeping task, laundry, cooking and driving. Patient reports he would like to return home once he is able to discharge from [facility]. Patient reports that he has a strong support system in place with adult children and other family members. Patient reports he and his wife have 2 daughters and one son, all who have remained in the area. SW anticipates patient will benefit from ongoing skilled therapies and medical equipment. SW will continue to monitor progress and maintain contact with patient and patient's family regarding length of stay and recommendations. SW will continue to assist with ongoing discharge planning.     Nursing:     Weight:  Weight: has been stable     Wounds/Incisions/Ulcers:  No skin/wound issues identified  Bowel: continent  Bladder:continent     Pain: Patient's pain is currently controlled with tylenol    Education Provided:  Diabetic:  No  Peg Tube:No    [place] Care:  Education:NA  Removal Necessary:  NA  Supplies Needed: NA     Anticoagulant Use:  Patient on lovenox for anticoagulation, which is being managed by Primary Care Physician    Antibiotic Use:  Patient not on antibiotics    Psychotropic Medication Use: Patient not on psychotropic medications. Oxygen Use: Yes 3-4 L    Home Medications Reconciled: N/A    Physical Therapy:   Patient recently evaluated and therefore progress limited. Patient completing mobility with CGA with RW. Patient limited by LE weakness and impaired balance. Patient requiring supplemental O2, and at PLOF with ambulatory with no AD and required no supplemental O2. Patient requires frequent rest breaks due to shortness of breath and LE weakness. Patient would benefit from continued skilled PT services to improve his ability to complete functional mobility, reduce his risk for falls and allow patient to return to PLOF. PT Equipment Recommendations  Other: monitor for needs - poss RW at discharge? Occupational  Therapy:  Pt is making good, steady progress towards goals during short stay on TCU thus far- evaluation was completed yesterday. Factors facilitating goal achievement include: pleasant, cooperative, motivated, good family support. Barriers to goal achievement include: decreased strength, decreased activity tolerance, decreased B shoulder ROM, decreased safety awareness with management of O2 tubing. Family education has been addressed on the following topics: n/a d/t COVID restrictions however would benefit from education session prior to discharge. Pt continues to require skilled Occupational Therapy to address the following performance deficits balance, ADLs with AE, safety awareness, and activity tolerance. Without skilled OT intervention pt is at risk for functional decline, increased falls, and readmission to hospital.     Equipment: Patient would benefit from shower chair, pulse ox           Nutrition:    Weight: 161 lb 14.4 oz (73.4 kg) / Body mass index is 26.13 kg/m². Please see nutrition note for details.     Family Education: No family available for education  Fall Risk:  Falling star program initiated    Goal Achievement:   Patient Continues to progress toward goal

## 2021-02-01 NOTE — CONSULTS
Comprehensive Nutrition Assessment    Type and Reason for Visit:  Initial, Consult(New TCU Admit)    Nutrition Recommendations/Plan:   *Recommend a Multivitamin w/minerals daily. *Continue current diet. *Discontinued Ensure Enlive per pt request. Pt declines all ONS at this time. Nutrition Assessment: Pt. nutritionally compromised AEB pt with some po intake less than 75% since admit and pt report of poor oral intake PTA; however, pt reports improved appetite and intake over the past few days and tastes his taste is coming back At risk for further nutrition compromise r/t admit with covid in hypermetabolic phase, respiratory distress, and underlying medical condition (hx: kidney cancer, HTN). Nutrition recommendations/interventions as per above. Malnutrition Assessment:  Malnutrition Status:  Insufficient data    Context:  Acute Illness     Findings of the 6 clinical characteristics of malnutrition:  Energy Intake:  Mild decrease in energy intake (Comment)(some po intake less than 75%)  Weight Loss:  (-10.6% in 2 weeks; question weight accuracy d/t differnt scales on different floors used?)     Body Fat Loss:  Unable to assess     Muscle Mass Loss:  Unable to assess    Fluid Accumulation:  No significant fluid accumulation Extremities   Strength:  Not Performed    Estimated Daily Nutrient Needs:  Energy (kcal):  3804-5283 kcal/day (25-35 kcal/kg)-  last COVID phase; Weight Used for Energy Requirements:  (73.4 kg on 2/1/21)     Protein (g):   g/day (1.2-2 g/kg);  Weight Used for Protein Requirements:  (73.4 kg on 2/1/21)        Fluid (ml/day):  per MD    Nutrition Related Findings:  COVID+ on 1/17; pt's weight is down -10.6% in 2 weeks; question weight accuracy d/t differnt scales on different floors used?; pt reports improved appetite and intake consuming 75% of most meals; pt states his taste is coming back; pt states he is not drinking the Ensure Enlive and does not want it anymore; pt declines all other ONS; pt denies N/V or difficulty chewing/swallowing food; last BM x1 on 1/30. Labs noted. Rx includes: Vitamin C, Vitamin D and Zinc      Wounds:  None       Current Nutrition Therapies:    DIET GENERAL;  Dietary Nutrition Supplements: Standard High Calorie Oral Supplement    Anthropometric Measures:  · Height: 5' 6\" (167.6 cm)  · Current Body Weight: 161 lb 14.4 oz (73.4 kg)(2/1; no edema noted)   · Admission Body Weight: 160 lb 5 oz (72.7 kg)(1/30; no edema noted)    · Usual Body Weight: (~ 180# per pt. Per EMR: 180 lb 12.8 ozon 1/17/21)     · Ideal Body Weight: 142 lbs  · BMI: 26.1  · BMI Categories: Overweight (BMI 25.0-29. 9)       Nutrition Diagnosis:   · Inadequate oral intake related to altered taste perception(poor appetite) as evidenced by intake 51-75%, poor intake prior to admission(some po intake less than 75%)    Nutrition Interventions:   Food and/or Nutrient Delivery:  Continue Current Diet, Vitamin Supplement, Discontinue Oral Nutrition Supplement(Discontinued Ensure Enlive per pt request.)  Nutrition Education/Counseling:  Education initiated(Encouraged po intake of meals and snacks at best effort)   Coordination of Nutrition Care:  Continue to monitor while inpatient    Goals:  Pt will consume 75% or more of meals during LOS       Nutrition Monitoring and Evaluation:   Behavioral-Environmental Outcomes:  None Identified   Food/Nutrient Intake Outcomes:  Food and Nutrient Intake, Supplement Intake, Vitamin/Mineral Intake  Physical Signs/Symptoms Outcomes:  Biochemical Data, GI Status, Weight, Skin, Nutrition Focused Physical Findings, Fluid Status or Edema     Discharge Planning:     Too soon to determine     Electronically signed by Devon Vazquez RD, LD on 2/1/21 at 11:41 AM EST    Contact: retinamelia pigmentosa

## 2021-02-01 NOTE — PLAN OF CARE
Problem: Mobility - Impaired:  Goal: Mobility will improve  Description: Mobility will improve  Note: Patient ambulated in halls with nursing staff in addition to therapy this day. Tolerates well. Unable to wean oxygen down from 4 L/min via nasal cannula. SOB with exertion but oxygen saturations remain greater than 90%. Remains alert and oriented.

## 2021-02-01 NOTE — PROGRESS NOTES
facilitated to challenge balance with 1-2 UE release from walker and activity tolerance. Patient required CGA-SBA for balance, and demo'ed an endurance of 4 minutes x1 trial, 3 mintues x2 trials, 2 minutes x1 trial. Seated rest break required after each trial of standing      Static standing within doorway of spouses room to talk for approx 2 minutes with BUE support on walker. BED MOBILITY:  Not Tested    TRANSFERS:  Sit to Stand:  Contact Guard Assistance. From various surfaces with cueing provided for hand placement  Stand to Sit: [employer]. To various surfaces with cueing provided for hand placement, poor recall noted    FUNCTIONAL MOBILITY:  Assistive Device: 4 Wheeled Walker  Assist Level:  [employer]. Distance: To and from therapy gym  Slow pace, no LOB noted. Seated rest break required after each trial of mobility    ADDITIONAL ACTIVITIES:  Patient identified a personal goal to increase UB strength and improve overall endurance so they can complete their toilet & shower transfers; skilled edu on UE strengthening and patient completed BUE strengthening exercises x15 reps x1 set this date with a 2# dumb bell in all distal joints and all planes. Completed B shoulder table top exercises with shoulder flexion and shoulder horizontal abduction/adduction x15 reps x1 set, holding each exercise for approx 3 seconds. Patient tolerated fair, requiring rest breaks. Pt required cues for technique. Patient on 4 L O2 via Nasal Canula  upon arrival to room. Patient O2 sats at 95 %. Upon activity patient maintaining O2 at 90-95 %. Pt requiring rest break(s) to recover. Encouraged deep breathing in through nose and out through mouth    ASSESSMENT:   Assessment: Pt is making good, steady progress towards goals during short stay on TCU thus far- evaluation was completed yesterday. Factors facilitating goal achievement include: pleasant, cooperative, motivated, good family support.  Barriers to goal achievement include: decreased strength, decreased activity tolerance, decreased B shoulder ROM, decreased safety awareness with management of O2 tubing. Family education has been addressed on the following topics: n/a d/t COVID restrictions however would benefit from education session prior to discharge. Pt continues to require skilled Occupational Therapy to address the following performance deficits balance, ADLs with AE, safety awareness, and activity tolerance. Without skilled OT intervention pt is at risk for functional decline, increased falls, and readmission to hospital.     Activity Tolerance:  Patient tolerance of  treatment: fair. Discharge Recommendations: Continue to assess pending progress, Home with Home health OT   Equipment Recommendations: Equipment Needed: Yes  Other: Patient would benefit from shower chair, pulse ox  Plan: Times per week: 6x  Current Treatment Recommendations: Functional Mobility Training, Endurance Training, Self-Care / ADL, Patient/Caregiver Education & Training, Strengthening, Balance Training, Safety Education & Training, Equipment Evaluation, Education, & procurement, Home Management Training    Patient Education  Patient Education: safety with transfers and mobility, BUE exercises including table top exercises    Goals  Short term goals  Time Frame for Short term goals: 1 week  Short term goal 1: Pt will complete BUE ROM/light resistive exercises with min vcs for technique to increase indep and endurance with all self cares and IADLs. Short term goal 2: Pt will complete standing task x 3 minutes with 1-2 UE release and SBA while o2 stats remain avove 90% to increase indep and endurance with grooming and simple IADLs. Short term goal 3: Pt will complete functional mobility HH distances with SBA and whil O2 stats remain above 90% to increase stength and endurance with all self cares and grocery shopping.   Short term goal 4: Pt will complete LB dressing with S, O2 stats remaining above 90% and 0 vcs for safety/taking rest breaks to increase indep in home environment. Long term goals  Time Frame for Long term goals : 3 weeks  Long term goal 1: Pt will complete IADL task Mod Indep while demostrating EC techniques to increase endurance and safety with all cooking and laundry in home environment. Long term goal 2: Pt will complete ADL routine including shower (when out of droplet +) with Mod indep while demonstrating EC techniques to increase endurance with all self cares. Following session, patient left in safe position with all fall risk precautions in place.

## 2021-02-01 NOTE — PROGRESS NOTES
[facility]  INPATIENT PHYSICAL THERAPY  DAILY NOTE  [address] - 8E-70/070-A  Time In: 920  Time Out: 1016  Timed Code Treatment Minutes: 64 Minutes  Minutes: 56          Date: 2021  Patient Name: Michelet Cha,  Gender:  male        MRN: [id number]  : 1940  (79 y.o.)     Referring Practitioner: Ordering: Marcio Osborne MD; Attending: Amos Page MD     Additional Pertinent Hx: Per ED note, pt \"is a 79 y.o. male who presents to the Emergency Department for the evaluation of cough and shortness of breath. Patient reports gradual onset of symptoms over the past 5 days with significant worsening of the shortness of breath over the past 2 days. He reports unproductive cough associated with decreased sense of taste and smell and some nasal congestion over the past week. Denies any known sick contacts and has not been tested for Covid since symptom onset. He reports worsening of shortness of breath with exertion, improvement with rest.  Reports generalized weakness that occurs with activity. He has been taking Tylenol and was also started on vitamin C, vitamin D, zinc by his daughter earlier in the season. He denies any pulmonary history. He is not a smoker. He denies any associated fevers, chills, generalized body aches, headache, sore throat, vomiting, diarrhea, wheezing, edema, orthopnea or dizziness. \"     Prior Level of Function:  Lives With: Spouse  Type of Home: House  Home Layout: One level  Home Access: Stairs to enter with rails  Entrance Stairs - Number of Steps: 3-4 steps with 1 rail  Home Equipment: (none)   Bathroom Shower/Tub: Tub/Shower unit  Bathroom Toilet: Standard  Bathroom Equipment: Grab bars in shower  Bathroom Accessibility: Accessible    Receives Help From: Family  ADL Assistance: [address]: Independent  Homemaking Responsibilities: Yes  Ambulation Assistance: Independent  Transfer Assistance: Independent  Active : Yes  Additional Comments: Pt reports being Independent with all mobility in PLOF. Pt shared homemaking with his spouse prior to admission. Pt's spouse is also hospitalized on this same unit due to COVID-19 virus at this time. Restrictions/Precautions:  Restrictions/Precautions: General Precautions, Fall Risk, Isolation  Position Activity Restriction  Other position/activity restrictions: 1/31 O2 on 3L and 4L with activity     SUBJECTIVE: Patient in room in chair, agreeable to PT. Pt on 4L with activity and rest.  Patient very cooperative and pleasant. PAIN: 0/10    OBJECTIVE:  Bed Mobility:  Not Tested    Transfers:  Sit to Stand: Contact Guard Assistance  Stand to [place] [id number]   Verbal cues for hand placement throughout. Ambulation:  Contact Guard Assistance  Distance: 75'x2, 5'x2  Surface: Level Tile  Device:Rolling Walker  Gait Deviations:  Slow Emilia, Decreased Step Length Bilaterally, Decreased Gait Speed and Decreased Heel Strike Bilaterally  Verbal cues for increased stride length     Minimal Assistance  Distance: 20', 3'  Surface: Level Tile  Device:No Device  Gait Deviations: Forward Flexed Posture, Slow Emilia, Decreased Step Length Bilaterally, Decreased Arm Swing, Decreased Gait Speed, Decreased Heel Strike Bilaterally, Unsteady Gait and Decreased Terminal Knee Extension  Verbal cues for increased stride length. Pt with decreased step length and step height. Stairs:  Contact Guard Assistance  Number of Steps: 4  Height: 6\" step with One Handrail    Balance:  Static Standing Balance: Contact Guard Assistance  Dynamic Standing Balance: Contact Guard Assistance, Minimal Assistance    Exercise:  Patient was guided in 1 set(s) 10 reps of exercise to both lower extremities. Standing: march, hip flexion, hip abduction, hamstring curls, heel raise, hip extension. Sit < > stand x 5 reps no UE support.  Standing: step ups 6\" step x 5 each LE, side step ups x 5 each LE 6\" step with bilateral UE support. Step taps anterior with no to 1 UE support: completed to increased hip flexion strength for improved LE advancement with gait. Exercises were completed for increased independence with functional mobility. Functional Outcome Measures: Not completed       ASSESSMENT:  Assessment: Patient progressing toward established goals. Patient recently evaluated and therefore progress limited. Patient completing mobility with CGA with RW. Patient limited by LE weakness and impaired balance. Patient requiring supplemental O2, and at PLOF with ambulatory with no AD and required no supplemental O2. Patient requires frequent rest breaks due to shortness of breath and LE weakness. Patient would benefit from continued skilled PT services to improve his ability to complete functional mobility, reduce his risk for falls and allow patient to return to PLOF. Activity Tolerance:  Patient tolerance of  treatment: good. Pt on 4L O2 throughout treatment, O2 ranging 87-92% with activity, requiring 1-2 minutes for O2 to recover. Good demonstration of pursed lip breathing. Equipment Recommendations: Other: monitor for needs - poss RW at discharge?   Discharge Recommendations:  Continue to assess pending progress    Plan: Times per week: 6x  Current Treatment Recommendations: Strengthening, Gait Training, Patient/Caregiver Education & Training, Equipment Evaluation, Education, & procurement, Balance Training, Functional Mobility Training, Endurance Training, Transfer Training, Safety Education & Training, Home Exercise Program, Stair training    Patient Education  Patient Education: Transfers, Gait, Stairs, Verbal Exercise Instruction,  - Patient Verbalized Understanding, - Patient Requires Continued Education    Goals:  Patient goals : go home  Short term goals  Time Frame for Short term goals: 1 week  Short term goal 1: Pt to be Mod I for supine <> sit to get in/out of bed  Short term goal 2: Pt to be Supervision for sit <> stand to get up to ambulate  Short term goal 3: Pt to ambulate > 60 ft with RW/4WW with Supervision for household distances  Short term goal 4: Pt to negotiate 4 steps with 1-2 rails with SBA for home access  Long term goals  Time Frame for Long term goals : 3 weeks  Long term goal 1: Pt to be Mod I for supine <> sit without rail to get in/out of bed  Long term goal 2: Pt to be Mod I for sit <> stand to get up to ambulate  Long term goal 3: Pt to ambulate > 150 ft with RW/4WW with Mod I for household and community distances  Long term goal 4: Pt to negotiate 4 steps with 1 rail with Supervision for home access    Following session, patient left in safe position with all fall risk precautions in place.

## 2021-02-02 PROCEDURE — 1290000000 HC SEMI PRIVATE OTHER R&B

## 2021-02-02 PROCEDURE — 97116 GAIT TRAINING THERAPY: CPT

## 2021-02-02 PROCEDURE — 97530 THERAPEUTIC ACTIVITIES: CPT

## 2021-02-02 PROCEDURE — 97535 SELF CARE MNGMENT TRAINING: CPT

## 2021-02-02 PROCEDURE — 97110 THERAPEUTIC EXERCISES: CPT

## 2021-02-02 PROCEDURE — 6360000002 HC RX W HCPCS: Performed by: FAMILY MEDICINE

## 2021-02-02 PROCEDURE — 6370000000 HC RX 637 (ALT 250 FOR IP): Performed by: INTERNAL MEDICINE

## 2021-02-02 RX ADMIN — Medication 50 MG: at 08:42

## 2021-02-02 RX ADMIN — OXYCODONE HYDROCHLORIDE AND ACETAMINOPHEN 500 MG: 500 TABLET ORAL at 08:42

## 2021-02-02 RX ADMIN — AMLODIPINE BESYLATE 5 MG: 5 TABLET ORAL at 08:42

## 2021-02-02 RX ADMIN — PANTOPRAZOLE SODIUM 40 MG: 40 TABLET, DELAYED RELEASE ORAL at 05:00

## 2021-02-02 RX ADMIN — ENOXAPARIN SODIUM 30 MG: 30 INJECTION SUBCUTANEOUS at 08:42

## 2021-02-02 RX ADMIN — LOSARTAN POTASSIUM 100 MG: 100 TABLET, FILM COATED ORAL at 08:42

## 2021-02-02 RX ADMIN — ENOXAPARIN SODIUM 30 MG: 30 INJECTION SUBCUTANEOUS at 20:11

## 2021-02-02 RX ADMIN — ASPIRIN 81 MG: 81 TABLET, CHEWABLE ORAL at 08:42

## 2021-02-02 RX ADMIN — Medication 1000 UNITS: at 08:42

## 2021-02-02 NOTE — PROGRESS NOTES
[facility]  Recreational Therapy  Evaluation  Inpatient Rehabilitation Unit      Time Spent with Patient: 25 minutes    Date:  2/2/2021       Patient Name: Chely Cohen      MRN: [id number]       YOB: 1940 [facility]79 y.o[facility]       Gender: male  Diagnosis: COVID 23  Referring Practitioner: Rhonda Campo MD Attending: Dr Gal Yousif    RESTRICTIONS/PRECAUTIONS:  Restrictions/Precautions: General Precautions, Fall Risk, Isolation  Vision: Impaired  Hearing: Within functional limits    PAIN: 0    SUBJECTIVE:  Pt lives with wife-they have 3 children close by and supportive  -wife is also a pt on TCU at this time    VISION:  Glasses-just had cataract removed from L eye     HEARING: Within Normal Limit    LEISURE INTERESTS:   Pt states he and wife share the household responsibilities inside-he does the yard work and enjoys being outdoors-he enjoys reading the newspaper, watches tv, he and wife go for a drive and go out to eat, attends [employer] and [place] in the spring and summer-upbeat and positive attitude-on 3 liters of 02 and hoping to be weaned off soon so he can go home-his gordon's day advice is to give and take and to just enjoy life    BARRIERS TO LEISURE INTERESTS:    Decreased endurance           Patient Education  New Education Provided: Importance of Leisure, RT Plan of Care    Plan:  Continue to follow patient through this admission  See patient individually    Electronically signed by: LATA Waldron  Date: 2/2/2021

## 2021-02-02 NOTE — PLAN OF CARE
Problem: Gas Exchange - Impaired  Goal: Absence of hypoxia  Outcome: Ongoing  Remains on oxygen at 3 liters with saturations > 92%    Problem: Gas Exchange - Impaired  Goal: Promote optimal lung function  Outcome: Ongoing  Encouraged incentive spirometer and acapella at bedside    Problem: Isolation Precautions - Risk of Spread of Infection  Goal: Prevent transmission of infection  Outcome: Ongoing  Patient remains in contact isolation for Covid 19. Educated on Covid 19 and ways to prevent transmission to others. Educated on contact isolation procedures for visitors and staff. Problem: Nutrition Deficits  Goal: Optimize nutritional status  Outcome: Ongoing  Tolerating current diet and po fluids well. Problem: Loneliness or Risk for Loneliness  Goal: Demonstrate positive use of time alone when socialization is not possible  Outcome: Ongoing  Visits with spouse, who is also a patient on the unit    Problem: Fatigue  Goal: Verbalize increase energy and improved vitality  Outcome: Ongoing  Verbalized increased strength and endurance    Problem: Falls - Risk of:  Goal: Will remain free from falls  Description: Will remain free from falls  Outcome: Ongoing  Falling star prevention in place. Bed and chair alarms in use. Call light in reach. Purposeful hourly rounding. Problem: Pain:  Goal: Pain level will decrease  Description: Pain level will decrease  Outcome: Ongoing  Pain decreases with rest, repositioning, ice application, and prn pain medications. Problem: Bleeding:  Goal: Will show no signs and symptoms of excessive bleeding  Description: Will show no signs and symptoms of excessive bleeding  Outcome: Ongoing  No signs of excessive bleeding noted this shift. Problem: Mobility - Impaired:  Goal: Mobility will improve  Description: Mobility will improve  Outcome: Ongoing  Patient continues with therapies and is working toward discharge goals.     Problem: Skin Integrity:  Goal: Absence of new skin

## 2021-02-02 NOTE — PROGRESS NOTES
[address] [address]  [address]  Occupational Therapy  Daily Note  Time:   Time In: 490  Time Out: 0848  Timed Code Treatment Minutes: 45 Minutes  Minutes: 38          Date: 2021  Patient Name: Davide Garrido,   Gender: male      Room: [facility]/070-A  MRN: [id number]  : 1940  (79 y.o.)  Referring Practitioner: Danish King MD Attending: Dr Hakeem Gutierrez  Diagnosis: [place] 19  Additional Pertinent Hx: Pt presents to the Emergency Department for the evaluation of cough and shortness of breath. Patient reports gradual onset of symptoms over the past 5 days with significant worsening of the shortness of breath over the past 2 days. He reports unproductive cough associated with decreased sense of taste and smell and some nasal congestion over the past week. Denies any known sick contacts and has not been tested for Covid since symptom onset. He reports worsening of shortness of breath with exertion, improvement with rest.  Reports generalized weakness that occurs with activity. Pt admitted to TCU on 21 for further therapy. Restrictions/Precautions:  Restrictions/Precautions: General Precautions, Fall Risk, Isolation  Position Activity Restriction  Other position/activity restrictions:  O2 on 3L and 4L with activity;  4L at rest/activity     SUBJECTIVE: Up in chair upon arrival, agreeable to OT session, motivated      Patient on 3 L O2 via Nasal Canula  upon arrival to room. Patient O2 sats at 98 %. Upon activity patient maintaining O2 at 91 %. Pt requiring minimal rest break(s) to recover. PAIN: 0/10:     COGNITION: WFL    ADL:   Feeding: with set-up. A for opening containers  Grooming: Stand By Assistance. completed oral care standing sinkside x 3 minutes with 1 UE support, completed shaving sitting in bedside chiar. BALANCE:  Standing Balance: Stand By Assistance.  x 3 minutes with 1 UE support    BED MOBILITY:  Not Tested    TRANSFERS:  Sit to Stand:  Stand By Assistance. bedside chair  Stand to Sit: Stand By Assistance. FUNCTIONAL MOBILITY:  Assistive Device: 4 Wheeled Walker  Assist Level:  Contact Guard Assistance. Distance: To and from bathroom  A for 02 tubing management     ADDITIONAL ACTIVITIES:  Patient identified a personal goal to increase UB strength and improve overall endurance so they can complete their toilet & shower transfers; skilled edu on UE strengthening and patient completed BUE strengthening exercises x10 reps x1 set this date with a resistive band in all joints and all planes. Patient tolerated well, requiring minimal rest breaks. Limited range in B shoulders      ASSESSMENT:     Activity Tolerance:  Patient tolerance of  treatment: good. Discharge Recommendations: Continue to assess pending progress, Home with Home health OT   Equipment Recommendations: Equipment Needed: Yes  Other: Patient would benefit from shower chair, pulse ox  Plan: Times per week: 6x  Current Treatment Recommendations: Functional Mobility Training, Endurance Training, Self-Care / ADL, Patient/Caregiver Education & Training, Strengthening, Balance Training, Safety Education & Training, Equipment Evaluation, Education, & procurement, Home Management Training    Patient Education  Patient Education: ADL's and Home Exercise Program    Goals  Short term goals  Time Frame for Short term goals: 1 week  Short term goal 1: Pt will complete BUE ROM/light resistive exercises with min vcs for technique to increase indep and endurance with all self cares and IADLs. Short term goal 2: Pt will complete standing task x 3 minutes with 1-2 UE release and SBA while o2 stats remain avove 90% to increase indep and endurance with grooming and simple IADLs. Short term goal 3: Pt will complete functional mobility HH distances with SBA and whil O2 stats remain above 90% to increase stength and endurance with all self cares and grocery shopping.   Short term goal 4: Pt will complete LB dressing with S, O2 stats remaining above 90% and 0 vcs for safety/taking rest breaks to increase indep in home environment. Long term goals  Time Frame for Long term goals : 3 weeks  Long term goal 1: Pt will complete IADL task Mod Indep while demostrating EC techniques to increase endurance and safety with all cooking and laundry in home environment. Long term goal 2: Pt will complete ADL routine including shower (when out of droplet +) with Mod indep while demonstrating EC techniques to increase endurance with all self cares. Following session, patient left in safe position with all fall risk precautions in place.

## 2021-02-02 NOTE — PROGRESS NOTES
[address] [address]  INPATIENT PHYSICAL THERAPY  DAILY NOTE  [address]-70/070-A    Time In: 3256  Time Out: 1228  Timed Code Treatment Minutes: 48 Minutes  Minutes: 53          Date: 2021  Patient Name: Palmer Soulier,  Gender:  male        MRN: [id number]  : 1940  (79 y.o.)     Referring Practitioner: Ordering: Kenna Brandon MD; Attending: Tommy Dominguez MD     Additional Pertinent Hx: Per ED note, pt \"is a 79 y.o. male who presents to the Emergency Department for the evaluation of cough and shortness of breath. Patient reports gradual onset of symptoms over the past 5 days with significant worsening of the shortness of breath over the past 2 days. He reports unproductive cough associated with decreased sense of taste and smell and some nasal congestion over the past week. Denies any known sick contacts and has not been tested for Covid since symptom onset. He reports worsening of shortness of breath with exertion, improvement with rest.  Reports generalized weakness that occurs with activity. He has been taking Tylenol and was also started on vitamin C, vitamin D, zinc by his daughter earlier in the season. He denies any pulmonary history. He is not a smoker. He denies any associated fevers, chills, generalized body aches, headache, sore throat, vomiting, diarrhea, wheezing, edema, orthopnea or dizziness. \"     Prior Level of Function:  Lives With: Spouse  Type of Home: House  Home Layout: One level  Home Access: Stairs to enter with rails  Entrance Stairs - Number of Steps: 3-4 steps with 1 rail  Home Equipment: (none)   Bathroom Shower/Tub: Tub/Shower unit  Bathroom Toilet: Standard  Bathroom Equipment: Grab bars in shower  Bathroom Accessibility: Accessible    Receives Help From: Family  ADL Assistance: [address]: Independent  Homemaking Responsibilities: Yes  Ambulation Assistance: Independent  Transfer Assistance: Independent  Active : Yes  Additional Comments: Pt reports being Independent with all mobility in PLOF. Pt shared homemaking with his spouse prior to admission. Pt's spouse is also hospitalized on this same unit due to COVID-19 virus at this time. Restrictions/Precautions:  Restrictions/Precautions: General Precautions, Fall Risk, Isolation  Position Activity Restriction  Other position/activity restrictions: 1/31 O2 on 3L and 4L with activity; 2/1 4L at rest/activity     SUBJECTIVE: Patient in bedside chair upon arrival and agreeable to therapy. Patient on 3L O2 at rest, O2 sats %. O2 sats dropped to 88% with activity on 3L, however quick recovery to 90%. PAIN: denies    OBJECTIVE:  Bed Mobility:  Not Tested    Transfers:  Sit to Stand: Stand By Assistance, [employer], cues for hand placement, with verbal cues  Stand to Sit:Contact Guard Assistance, cues for hand placement, with verbal cues   **cues to lock 4WW brakes    Ambulation:  Contact Guard Assistance  Distance: ~80ft x2  Surface: Level Tile  Device:4 Wheeled Walker  Gait Deviations: Forward Flexed Posture, Slow Emilia, Decreased Step Length Bilaterally, Decreased Gait Speed, Decreased Heel Strike Bilaterally, Narrow Base of Support and cues to increase step length    Stairs:  Contact Guard Assistance  Number of Steps: 1 (forward/lateral step ups leading R/L x10 reps each)  Height: 6\" step with Parallel Bars    Exercise:  Patient was guided in 1 set(s) 15 reps of exercise to both lower extremities. Seated marches, Seated hamstring curls, Seated heel/toe raises, Long arc quads, Standing heel/toe raises, Standing marches, Standing hip abduction/adduction, Standing hip flexion and Mini squats. Exercises were completed for increased independence with functional mobility. Requires seated rest breaks due to fatigue and SOB during standing exercises.      Functional Outcome Measures: Not completed       ASSESSMENT:  Assessment: Patient progressing toward established goals. Activity Tolerance:  Patient tolerance of  treatment: good. Equipment Recommendations: Other: monitor for needs - poss RW at discharge? Discharge Recommendations:  Continue to assess pending progress    Plan: Times per week: 6x  Current Treatment Recommendations: Strengthening, Gait Training, Patient/Caregiver Education & Training, Equipment Evaluation, Education, & procurement, Balance Training, Functional Mobility Training, Endurance Training, Transfer Training, Safety Education & Training, Home Exercise Program, Stair training    Patient Education  Patient Education: Plan of Care, Transfers, Gait, Stairs, Up in Chair for Myrna Smith, Verbal Exercise Instruction    Goals:  Patient goals : go home  Short term goals  Time Frame for Short term goals: 1 week  Short term goal 1: Pt to be Mod I for supine <> sit to get in/out of bed  Short term goal 2: Pt to be Supervision for sit <> stand to get up to ambulate  Short term goal 3: Pt to ambulate > 60 ft with RW/4WW with Supervision for household distances  Short term goal 4: Pt to negotiate 4 steps with 1-2 rails with SBA for home access  Long term goals  Time Frame for Long term goals : 3 weeks  Long term goal 1: Pt to be Mod I for supine <> sit without rail to get in/out of bed  Long term goal 2: Pt to be Mod I for sit <> stand to get up to ambulate  Long term goal 3: Pt to ambulate > 150 ft with RW/4WW with Mod I for household and community distances  Long term goal 4: Pt to negotiate 4 steps with 1 rail with Supervision for home access    Following session, patient left in safe position with all fall risk precautions in place.

## 2021-02-03 PROCEDURE — 97530 THERAPEUTIC ACTIVITIES: CPT

## 2021-02-03 PROCEDURE — 6360000002 HC RX W HCPCS: Performed by: FAMILY MEDICINE

## 2021-02-03 PROCEDURE — 97116 GAIT TRAINING THERAPY: CPT

## 2021-02-03 PROCEDURE — 1290000000 HC SEMI PRIVATE OTHER R&B

## 2021-02-03 PROCEDURE — 97535 SELF CARE MNGMENT TRAINING: CPT

## 2021-02-03 PROCEDURE — 6370000000 HC RX 637 (ALT 250 FOR IP): Performed by: INTERNAL MEDICINE

## 2021-02-03 PROCEDURE — 97110 THERAPEUTIC EXERCISES: CPT

## 2021-02-03 RX ADMIN — ACETAMINOPHEN 650 MG: 325 TABLET ORAL at 09:11

## 2021-02-03 RX ADMIN — ACETAMINOPHEN 650 MG: 325 TABLET ORAL at 19:59

## 2021-02-03 RX ADMIN — ACETAMINOPHEN 650 MG: 325 TABLET ORAL at 01:34

## 2021-02-03 RX ADMIN — AMLODIPINE BESYLATE 5 MG: 5 TABLET ORAL at 08:11

## 2021-02-03 RX ADMIN — ENOXAPARIN SODIUM 30 MG: 30 INJECTION SUBCUTANEOUS at 19:59

## 2021-02-03 RX ADMIN — PANTOPRAZOLE SODIUM 40 MG: 40 TABLET, DELAYED RELEASE ORAL at 05:44

## 2021-02-03 RX ADMIN — OXYCODONE HYDROCHLORIDE AND ACETAMINOPHEN 500 MG: 500 TABLET ORAL at 08:11

## 2021-02-03 RX ADMIN — ASPIRIN 81 MG: 81 TABLET, CHEWABLE ORAL at 08:11

## 2021-02-03 RX ADMIN — Medication 1000 UNITS: at 08:11

## 2021-02-03 RX ADMIN — Medication 50 MG: at 08:11

## 2021-02-03 RX ADMIN — LOSARTAN POTASSIUM 100 MG: 100 TABLET, FILM COATED ORAL at 08:11

## 2021-02-03 RX ADMIN — ENOXAPARIN SODIUM 30 MG: 30 INJECTION SUBCUTANEOUS at 08:11

## 2021-02-03 ASSESSMENT — PAIN DESCRIPTION - DESCRIPTORS
DESCRIPTORS: ACHING
DESCRIPTORS: ACHING

## 2021-02-03 ASSESSMENT — PAIN DESCRIPTION - LOCATION
LOCATION: SHOULDER
LOCATION: ARM;BACK

## 2021-02-03 ASSESSMENT — PAIN DESCRIPTION - ORIENTATION
ORIENTATION: RIGHT
ORIENTATION: RIGHT

## 2021-02-03 ASSESSMENT — PAIN SCALES - GENERAL
PAINLEVEL_OUTOF10: 5
PAINLEVEL_OUTOF10: 3

## 2021-02-03 ASSESSMENT — PAIN DESCRIPTION - FREQUENCY: FREQUENCY: INTERMITTENT

## 2021-02-03 ASSESSMENT — PAIN DESCRIPTION - PROGRESSION: CLINICAL_PROGRESSION: NOT CHANGED

## 2021-02-03 NOTE — PLAN OF CARE
Kerline   [id number]    This document includes baseline careplan information as well as current active orders for this admission to Transitional Care Unit (8E). A copy was given to the patient and/or patient representative today, 2/3/2021. Current Active Orders:  Orders Placed This Encounter   Procedures    DIET GENERAL;    Incentive spirometry nursing    Place PPD    Vital signs    Waffle cushion in chair when up    Turn or assist with turn approximately every 2 hours if patient is unable to turn self. Remind patient to turn if necessary.     Assess skin per unit guidelines    Pad/offload medical devices    Maintain HOB at the lowest elevation consistent with medical plan of care    Use lift equipment for lifting patient    Maintain heels off of bed at all times    Weigh patient weekly    Full Code    Consult to Recreation Therapy    Consult to Social Work    Inpatient consult to Dietitian    OT eval and treat    PT eval and treat    Initiate Oxygen Therapy Protocol    Acapella    PATIENT STATUS (DIRECT) Inpatient       Current Medications:  Current Facility-Administered Medications   Medication Dose Route Frequency Provider Last Rate Last Admin    enoxaparin (LOVENOX) injection 30 mg  30 mg Subcutaneous Q12H Khushboo Lozano MD   30 mg at 02/03/21 8784    acetaminophen (TYLENOL) tablet 650 mg  650 mg Oral Q6H PRN Joe Osullivan MD   650 mg at 02/03/21 0134    Or    acetaminophen (TYLENOL) suppository 650 mg  650 mg Rectal Q6H PRN Joe Osullivan MD        polyethylene glycol (GLYCOLAX) packet 17 g  17 g Oral Daily PRN Joe Osullivan MD        amLODIPine (NORVASC) tablet 5 mg  5 mg Oral Daily Joe Osullivan MD   5 mg at 02/03/21 8512    ascorbic acid (VITAMIN C) tablet 500 mg  500 mg Oral Daily Joe Osullivan MD   500 mg at 02/03/21 2415    aspirin chewable tablet 81 mg  81 mg Oral Daily Joe Osullivan MD   81 mg at 02/03/21 0811    losartan (COZAAR) tablet not possible

## 2021-02-03 NOTE — PLAN OF CARE
Ongoing  No new skin breakdown noted since admission. Problem: Nutrition  Goal: Optimal nutrition therapy  Outcome: Ongoing  Tolerating current diet and po fluids well.

## 2021-02-03 NOTE — PROGRESS NOTES
[address] [address]  [address]  Occupational Therapy  Daily Note  Time:   Time In: 1330  Time Out: 1423  Timed Code Treatment Minutes: 48 Minutes  Minutes: 53          Date: 2/3/2021  Patient Name: Lloyd Iyer,   Gender: male      Room: [facility]/070-A  MRN: [id number]  : 1940  (79 y.o.)  Referring Practitioner: Fady Gonzalez MD Attending: Dr Stella Grier  Diagnosis: COVID 19  Additional Pertinent Hx: Pt presents to the Emergency Department for the evaluation of cough and shortness of breath. Patient reports gradual onset of symptoms over the past 5 days with significant worsening of the shortness of breath over the past 2 days. He reports unproductive cough associated with decreased sense of taste and smell and some nasal congestion over the past week. Denies any known sick contacts and has not been tested for Covid since symptom onset. He reports worsening of shortness of breath with exertion, improvement with rest.  Reports generalized weakness that occurs with activity. Pt admitted to TCU on 21 for further therapy. Restrictions/Precautions:  Restrictions/Precautions: General Precautions, Fall Risk, Isolation  Position Activity Restriction  Other position/activity restrictions:  O2 on 3L and 4L with activity;  4L at rest/activity     SUBJECTIVE: Up in chair upon arrival, agreeable to OT session, motivated, pleasant    Patient on 2 L O2 via Nasal Canula  upon arrival to room. Patient O2 sats at 96 %. Upon activity patient dropping O2 at 86 %. Pt requiring minimal rest break(s) to recover. PAIN: 0/10:     COGNITION: WFL    ADL:   No ADL's completed this session. Nicholes Coca BALANCE:  Patient completed IADL homemaking task this date of cooking eggs - task was graded to challenge band safety, and 02 tubing. Patient was able to retrieve all items from counter and sink with close SBA and min VCs for safety during the retrieval and transportation of items. Patient required SBA throughout task with a standing endurance of 4 minutes. Patient required sitting rest break on walker seat. Patient used good safety using stove      BED MOBILITY:  Not Tested    TRANSFERS:  Sit to Stand:  Contact Guard Assistance. bedside chair and seat on walker  Stand to Sit: Contact [employer]. FUNCTIONAL MOBILITY:  Assistive Device: 4 Wheeled Walker  Assist Level:  Contact Guard Assistance. Distance: To and from therapy gym  Min VC for 02 tubing,  Patient was given extra tubing to be able to manage     ADDITIONAL ACTIVITIES:  Educated patient regarding home situation, energy conservation, rest break insight into assistance that him and his wife will require. Voiced understanding and appreciation    ASSESSMENT:     Activity Tolerance:  Patient tolerance of  treatment: good. Discharge Recommendations: Continue to assess pending progress, Home with Home health OT   Equipment Recommendations: Equipment Needed: Yes  Other: Patient would benefit from shower chair, pulse ox  Plan: Times per week: 6x  Current Treatment Recommendations: Functional Mobility Training, Endurance Training, Self-Care / ADL, Patient/Caregiver Education & Training, Strengthening, Balance Training, Safety Education & Training, Equipment Evaluation, Education, & procurement, Home Management Training    Patient Education  Patient Education: IADL's    Goals  Short term goals  Time Frame for Short term goals: 1 week  Short term goal 1: Pt will complete BUE ROM/light resistive exercises with min vcs for technique to increase indep and endurance with all self cares and IADLs. Short term goal 2: Pt will complete standing task x 3 minutes with 1-2 UE release and SBA while o2 stats remain avove 90% to increase indep and endurance with grooming and simple IADLs.   Short term goal 3: Pt will complete functional mobility HH distances with SBA and whil O2 stats remain above 90% to increase stength and endurance with all self cares and grocery shopping. Short term goal 4: Pt will complete LB dressing with S, O2 stats remaining above 90% and 0 vcs for safety/taking rest breaks to increase indep in home environment. Long term goals  Time Frame for Long term goals : 3 weeks  Long term goal 1: Pt will complete IADL task Mod Indep while demostrating EC techniques to increase endurance and safety with all cooking and laundry in home environment. Long term goal 2: Pt will complete ADL routine including shower (when out of droplet +) with Mod indep while demonstrating EC techniques to increase endurance with all self cares. Following session, patient left in safe position with all fall risk precautions in place.

## 2021-02-03 NOTE — PROGRESS NOTES
[address] [address]  INPATIENT PHYSICAL THERAPY  DAILY NOTE  [address]-70/070-A    Time In: 1194  Time Out: 1005  Timed Code Treatment Minutes: 64 Minutes  Minutes: 56          Date: 2/3/2021  Patient Name: Alok Razo,  Gender:  male        MRN: [id number]  : 1940  (79 y.o.)     Referring Practitioner: Ordering: Diana Bazan MD; Attending: Jonne Leyden, MD     Additional Pertinent Hx: Per ED note, pt \"is a 79 y.o. male who presents to the Emergency Department for the evaluation of cough and shortness of breath. Patient reports gradual onset of symptoms over the past 5 days with significant worsening of the shortness of breath over the past 2 days. He reports unproductive cough associated with decreased sense of taste and smell and some nasal congestion over the past week. Denies any known sick contacts and has not been tested for Covid since symptom onset. He reports worsening of shortness of breath with exertion, improvement with rest.  Reports generalized weakness that occurs with activity. He has been taking Tylenol and was also started on vitamin C, vitamin D, zinc by his daughter earlier in the season. He denies any pulmonary history. He is not a smoker. He denies any associated fevers, chills, generalized body aches, headache, sore throat, vomiting, diarrhea, wheezing, edema, orthopnea or dizziness. \"     Prior Level of Function:  Lives With: Spouse  Type of Home: House  Home Layout: One level  Home Access: Stairs to enter with rails  Entrance Stairs - Number of Steps: 3-4 steps with 1 rail  Home Equipment: (none)   Bathroom Shower/Tub: Tub/Shower unit  Bathroom Toilet: Standard  Bathroom Equipment: Grab bars in shower  Bathroom Accessibility: Accessible    Receives Help From: Family  ADL Assistance: [facility]: Independent  Homemaking Responsibilities: Yes  Ambulation Assistance: Independent  Transfer Assistance: for home access    Following session, patient left in safe position with all fall risk precautions in place.

## 2021-02-04 PROCEDURE — 94761 N-INVAS EAR/PLS OXIMETRY MLT: CPT

## 2021-02-04 PROCEDURE — 97110 THERAPEUTIC EXERCISES: CPT

## 2021-02-04 PROCEDURE — 6360000002 HC RX W HCPCS: Performed by: FAMILY MEDICINE

## 2021-02-04 PROCEDURE — 2700000000 HC OXYGEN THERAPY PER DAY

## 2021-02-04 PROCEDURE — 6370000000 HC RX 637 (ALT 250 FOR IP): Performed by: INTERNAL MEDICINE

## 2021-02-04 PROCEDURE — 97116 GAIT TRAINING THERAPY: CPT

## 2021-02-04 PROCEDURE — 1290000000 HC SEMI PRIVATE OTHER R&B

## 2021-02-04 PROCEDURE — 97535 SELF CARE MNGMENT TRAINING: CPT

## 2021-02-04 RX ADMIN — LOSARTAN POTASSIUM 100 MG: 100 TABLET, FILM COATED ORAL at 08:58

## 2021-02-04 RX ADMIN — AMLODIPINE BESYLATE 5 MG: 5 TABLET ORAL at 08:58

## 2021-02-04 RX ADMIN — OXYCODONE HYDROCHLORIDE AND ACETAMINOPHEN 500 MG: 500 TABLET ORAL at 08:58

## 2021-02-04 RX ADMIN — Medication 1000 UNITS: at 08:58

## 2021-02-04 RX ADMIN — Medication 50 MG: at 08:58

## 2021-02-04 RX ADMIN — ENOXAPARIN SODIUM 30 MG: 30 INJECTION SUBCUTANEOUS at 20:51

## 2021-02-04 RX ADMIN — ENOXAPARIN SODIUM 30 MG: 30 INJECTION SUBCUTANEOUS at 08:58

## 2021-02-04 RX ADMIN — PANTOPRAZOLE SODIUM 40 MG: 40 TABLET, DELAYED RELEASE ORAL at 05:39

## 2021-02-04 RX ADMIN — ASPIRIN 81 MG: 81 TABLET, CHEWABLE ORAL at 08:58

## 2021-02-04 RX ADMIN — ACETAMINOPHEN 650 MG: 325 TABLET ORAL at 20:51

## 2021-02-04 ASSESSMENT — PAIN - FUNCTIONAL ASSESSMENT: PAIN_FUNCTIONAL_ASSESSMENT: ACTIVITIES ARE NOT PREVENTED

## 2021-02-04 ASSESSMENT — PAIN DESCRIPTION - ONSET: ONSET: ON-GOING

## 2021-02-04 ASSESSMENT — PAIN DESCRIPTION - PROGRESSION: CLINICAL_PROGRESSION: NOT CHANGED

## 2021-02-04 ASSESSMENT — PAIN DESCRIPTION - ORIENTATION: ORIENTATION: RIGHT

## 2021-02-04 NOTE — PROGRESS NOTES
[facility]  Recreational Therapy  Daily Note  - [place] SKILLED NURSING UNIT    Time Spent with Patient: 0 minutes    Date:  2/4/2021       Patient Name: Chely Cohen      MRN: [id number]      YOB: 1940 [de-identified]79 y.o.)       Gender: male  Diagnosis: COVID 23  Referring Practitioner: Rhonda Campo MD Attending: Dr Gal Yousif    RESTRICTIONS/PRECAUTIONS:  Restrictions/Precautions: General Precautions, Fall Risk  Vision: Impaired  Hearing: Within functional limits    PAIN: 0    SUBJECTIVE:  I'm ready    OBJECTIVE:  Pt came to the gym with therapy to get he and his wife's picture taken with their gordon board-affect bright and social          Patient Education  New Education Provided: Importance of Leisure,     Electronically signed by: LATA Waldron  Date: 2/4/2021

## 2021-02-04 NOTE — PLAN OF CARE
Ongoing  Note: No new skin issues. Bottom red but blanchable. Problem: Discharge Planning:  Goal: Patients continuum of care needs are met  Description: Patients continuum of care needs are met  Outcome: Ongoing  Note: No discharge date at this time. Patient will be conferenced on 2/9     Problem: Skin Integrity:  Goal: Will show no infection signs and symptoms  Description: Will show no infection signs and symptoms  Outcome: Ongoing     Problem: Skin Integrity:  Goal: Absence of new skin breakdown  Description: Absence of new skin breakdown  Outcome: Ongoing     Problem: IP MOBILITY  Goal: LTG - patient will demonstrate safe mobility requirements  Outcome: Ongoing     Problem: IP INSTRUMENTAL ACTIVITIES OF DAILY LIVING  Goal: LTG - Patient will independently complete basic home making activities to return to independent functioning at home  Outcome: Ongoing     Problem: SAFETY  Goal: LTG - Patient will demonstrate safety requirements appropriate to situation/environment  Outcome: Ongoing     Problem: Nutrition  Goal: Optimal nutrition therapy  Outcome: Ongoing   Care plan reviewed with patient. Patient verbalizes understanding of care plan and treatment goals.

## 2021-02-04 NOTE — PROGRESS NOTES
[facility]  Occupational Therapy  Daily Note  Time:   Time In: 3087  Time Out: 1125  Timed Code Treatment Minutes: 54 Minutes  Minutes: 55          Date: 2021  Patient Name: Palmer Soulier,   Gender: male      Room: [facility]/070-  MRN: [id number]  : 1940  (79 y.o.)  Referring Practitioner: Amado Paulino MD Attending: Dr Rc Cisneros  Diagnosis: [place] 19  Additional Pertinent Hx: Pt presents to the Emergency Department for the evaluation of cough and shortness of breath. Patient reports gradual onset of symptoms over the past 5 days with significant worsening of the shortness of breath over the past 2 days. He reports unproductive cough associated with decreased sense of taste and smell and some nasal congestion over the past week. Denies any known sick contacts and has not been tested for Covid since symptom onset. He reports worsening of shortness of breath with exertion, improvement with rest.  Reports generalized weakness that occurs with activity. Pt admitted to TCU on 21 for further therapy. Restrictions/Precautions:  Restrictions/Precautions: General Precautions, Fall Risk  Position Activity Restriction  Other position/activity restrictions:  O2 on 3L and 4L with activity; 2/1 4L at rest/activity; 2/4 2L at rest/2-3L with activity     SUBJECTIVE: Patient seated in bedside chair upon arrival. Agreeable to OT session    PAIN: Complains of slight lower back pain during standing activities    COGNITION: WFL    ADL:   Feeding: Independent. For lunch tray. BALANCE:  Sitting Balance:  Modified Independent. Standing Balance: Stand By Assistance. BED MOBILITY:  Not Tested    TRANSFERS:  Sit to Stand:  Stand By Assistance. From various surfaces with min vcs for hand placement  Stand to Sit: Stand By Assistance.  To various surfaces with min vcs for hand placement and to lock brakes on 4WW    FUNCTIONAL MOBILITY:  Assistive Device: 4 Wheeled Walker  Assist Level:  Contact Guard Assistance. - close SBA   Distance: To and from therapy gym and throughout therapy gym,  Slow pace, no LOB noted. Required seated rest break after each trial of mobility. True Gentle provided throughout for management of O2 tubing     ADDITIONAL ACTIVITIES:  Patient identified a personal goal to increase UB strength and improve overall endurance so they can complete their toilet & shower transfers; skilled edu on UE strengthening and patient completed BUE strengthening exercises x15 reps x1 set this date with a 1# dumb bell in all joints and all planes. Completed BUE table top shoulder exercises with shoulder flexion and shoulder horizontal abduction/adduction x15 reps x1 set. Patient tolerated fair, requiring rest breaks. Pt required cues for technique. Patient completed IADL homemaking task this date of picking up linens and standing to fold - task was graded to challenge balance with 1-2 UE release from walker, safety, and activity tolerance. Patient was able to retrieve all items from graded heights including floor level with use of reacher requiring CGA - SBA for balance and 1 VCs for safety during the retrieval and transportation of items. True Gentle for management of O2 tubing required throughout. Patient required 2 seated rest breaks throughout task and 1 seated rest break after task with a standing endurance of 3 minutes 30 seconds x1 trial, 45 secs x2 trials. Patient completed IADL homemaking task this date of washing dishes - task was graded to challenge balance, safety, and activity tolerance. Patient required SBA for balance throughout task with a standing endurance of 5 minutes x1 trial and 2 minutes x1 trial. Required seated rest break d/t back pain. Patient required no VCs for cognitive aspect. ASSESSMENT:     Activity Tolerance:  Patient tolerance of  treatment: fair.        Discharge Recommendations: Continue to assess pending progress, Home with Home health OT   Equipment Recommendations: Equipment Needed: Yes  Other: Patient would benefit from shower chair, pulse ox  Plan: Times per week: 6x  Current Treatment Recommendations: Functional Mobility Training, Endurance Training, Self-Care / ADL, Patient/Caregiver Education & Training, Strengthening, Balance Training, Safety Education & Training, Equipment Evaluation, Education, & procurement, Home Management Training    Patient Education  Patient Education: safety with transfers and mobility, IADL tasks while managing O2 tubing, BUE exercises including table top exercises    Goals  Short term goals  Time Frame for Short term goals: 1 week  Short term goal 1: Pt will complete BUE ROM/light resistive exercises with min vcs for technique to increase indep and endurance with all self cares and IADLs. Short term goal 2: Pt will complete standing task x 3 minutes with 1-2 UE release and SBA while o2 stats remain avove 90% to increase indep and endurance with grooming and simple IADLs. Short term goal 3: Pt will complete functional mobility HH distances with SBA and whil O2 stats remain above 90% to increase stength and endurance with all self cares and grocery shopping. Short term goal 4: Pt will complete LB dressing with S, O2 stats remaining above 90% and 0 vcs for safety/taking rest breaks to increase indep in home environment. Long term goals  Time Frame for Long term goals : 3 weeks  Long term goal 1: Pt will complete IADL task Mod Indep while demostrating EC techniques to increase endurance and safety with all cooking and laundry in home environment. Long term goal 2: Pt will complete ADL routine including shower (when out of droplet +) with Mod indep while demonstrating EC techniques to increase endurance with all self cares. Following session, patient left in safe position with all fall risk precautions in place.

## 2021-02-04 NOTE — PROGRESS NOTES
[facility]  INPATIENT PHYSICAL THERAPY  DAILY NOTE  [address] [place] SKILLED NURSING UNIT - 8E-70/070-A    Time In: 0703  Time Out: 0800  Timed Code Treatment Minutes: 62 Minutes  Minutes: 57          Date: 2021  Patient Name: Omayra Cruz,  Gender:  male        MRN: [id number]  : 1940  (79 y.o.)     Referring Practitioner: Ordering: Suze Dillard MD; Attending: Jolene Recio MD     Additional Pertinent Hx: Per ED note, pt \"is a 79 y.o. male who presents to the Emergency Department for the evaluation of cough and shortness of breath. Patient reports gradual onset of symptoms over the past 5 days with significant worsening of the shortness of breath over the past 2 days. He reports unproductive cough associated with decreased sense of taste and smell and some nasal congestion over the past week. Denies any known sick contacts and has not been tested for Covid since symptom onset. He reports worsening of shortness of breath with exertion, improvement with rest.  Reports generalized weakness that occurs with activity. He has been taking Tylenol and was also started on vitamin C, vitamin D, zinc by his daughter earlier in the season. He denies any pulmonary history. He is not a smoker. He denies any associated fevers, chills, generalized body aches, headache, sore throat, vomiting, diarrhea, wheezing, edema, orthopnea or dizziness. \"     Prior Level of Function:  Lives With: Spouse  Type of Home: House  Home Layout: One level  Home Access: Stairs to enter with rails  Entrance Stairs - Number of Steps: 3-4 steps with 1 rail  Home Equipment: (none)   Bathroom Shower/Tub: Tub/Shower unit  Bathroom Toilet: Standard  Bathroom Equipment: Grab bars in shower  Bathroom Accessibility: Accessible    Receives Help From: Family  ADL Assistance: [facility] [address]: Independent  Homemaking Responsibilities: Yes  Ambulation Assistance: Independent  Transfer Assistance: Independent  Active : Yes  Additional Comments: Pt reports being Independent with all mobility in PLOF. Pt shared homemaking with his spouse prior to admission. Pt's spouse is also hospitalized on this same unit due to COVID-19 virus at this time. Restrictions/Precautions:  Restrictions/Precautions: General Precautions, Fall Risk, Isolation  Position Activity Restriction  Other position/activity restrictions: 1/31 O2 on 3L and 4L with activity; 2/1 4L at rest/activity     SUBJECTIVE: Patient in recliner upon arrival and agreeable to therapy. Patient on 2.5L O2 upon arrival, O2 sats 100%. Decreased to 2L for activity, O2 sats dropped to 87%, increased to 3L for remainder of session, O2 sats with activity ~93-95%. PAIN: back, not rated    OBJECTIVE:  Bed Mobility:  Not Tested    Transfers:  Sit to Stand: Stand By Assistance, with increased time for completion, cues for hand placement, with verbal cues  Stand to Sit:Stand By Assistance, with increased time for completion, cues for hand placement, with verbal cues  **improved O2 tubing awareness noted    Ambulation:  close SBA to light CGA  Distance: ~180ft x2  Surface: Level Tile  Device:4 Wheeled Walker  Gait Deviations: Forward Flexed Posture, Slow Emilia, Decreased Step Length Bilaterally, Decreased Gait Speed, Decreased Heel Strike Bilaterally, Narrow Base of Support and Mild Path Deviations    Stairs:  Contact Guard Assistance  Number of Steps: 4  Height: 6\" step with Bilateral Handrails, non-reciprocal, cues for technique    Balance:  Dynamic gait: CGA            -ambulation in parallel bars using plank to improve wider SHERRI            -in parallel bars: forward/retro/side-stepping walking, forward heel-to-toe, monster walk with orange tband and side-stepping monster walk with orange tband    Exercise:  Patient was guided in 1 set(s) 15 reps of exercise to both lower extremities.   Seated marches, Seated hamstring curls, Seated heel/toe raises, fall risk precautions in place. Note adendded by Shonda Flower PT to change frequency to 5xBID and 1xQD effective 2/5 due to patient able to tolerate increased therapy and increase mobility for safe return to home.

## 2021-02-05 PROCEDURE — 94761 N-INVAS EAR/PLS OXIMETRY MLT: CPT

## 2021-02-05 PROCEDURE — 97110 THERAPEUTIC EXERCISES: CPT

## 2021-02-05 PROCEDURE — 2700000000 HC OXYGEN THERAPY PER DAY

## 2021-02-05 PROCEDURE — 97116 GAIT TRAINING THERAPY: CPT

## 2021-02-05 PROCEDURE — 97535 SELF CARE MNGMENT TRAINING: CPT

## 2021-02-05 PROCEDURE — 6370000000 HC RX 637 (ALT 250 FOR IP): Performed by: INTERNAL MEDICINE

## 2021-02-05 PROCEDURE — 6360000002 HC RX W HCPCS: Performed by: FAMILY MEDICINE

## 2021-02-05 PROCEDURE — 0220000000 HC SKILLED NURSING FACILITY

## 2021-02-05 PROCEDURE — 97530 THERAPEUTIC ACTIVITIES: CPT

## 2021-02-05 PROCEDURE — 2500000003 HC RX 250 WO HCPCS: Performed by: INTERNAL MEDICINE

## 2021-02-05 PROCEDURE — 1290000000 HC SEMI PRIVATE OTHER R&B

## 2021-02-05 RX ADMIN — LOSARTAN POTASSIUM 100 MG: 100 TABLET, FILM COATED ORAL at 09:36

## 2021-02-05 RX ADMIN — Medication 50 MG: at 09:36

## 2021-02-05 RX ADMIN — ASPIRIN 81 MG: 81 TABLET, CHEWABLE ORAL at 09:36

## 2021-02-05 RX ADMIN — OXYCODONE HYDROCHLORIDE AND ACETAMINOPHEN 500 MG: 500 TABLET ORAL at 09:36

## 2021-02-05 RX ADMIN — AMLODIPINE BESYLATE 5 MG: 5 TABLET ORAL at 09:36

## 2021-02-05 RX ADMIN — ENOXAPARIN SODIUM 30 MG: 30 INJECTION SUBCUTANEOUS at 20:08

## 2021-02-05 RX ADMIN — Medication 5 UNITS: at 14:57

## 2021-02-05 RX ADMIN — ACETAMINOPHEN 650 MG: 325 TABLET ORAL at 14:54

## 2021-02-05 RX ADMIN — Medication 1000 UNITS: at 09:36

## 2021-02-05 RX ADMIN — PANTOPRAZOLE SODIUM 40 MG: 40 TABLET, DELAYED RELEASE ORAL at 05:49

## 2021-02-05 RX ADMIN — ENOXAPARIN SODIUM 30 MG: 30 INJECTION SUBCUTANEOUS at 09:36

## 2021-02-05 ASSESSMENT — PAIN SCALES - GENERAL
PAINLEVEL_OUTOF10: 8
PAINLEVEL_OUTOF10: 3

## 2021-02-05 ASSESSMENT — PAIN DESCRIPTION - PROGRESSION: CLINICAL_PROGRESSION: NOT CHANGED

## 2021-02-05 ASSESSMENT — PAIN DESCRIPTION - FREQUENCY: FREQUENCY: INTERMITTENT

## 2021-02-05 NOTE — PROGRESS NOTES
[address] [address]  INPATIENT PHYSICAL THERAPY  DAILY NOTE  - [facility]UR[facility] UNIT - 8E-70/070-A  Time In: 915  Time Out: 926  Timed Code Treatment Minutes: 11 Minutes  Minutes: 11          Date: 2021  Patient Name: Nadege Ma,  Gender:  male        MRN: [id number]  : 1940  (79 y.o.)     Referring Practitioner: Ordering: Delilah Rousseau MD; Attending: Miguelito Whitney MD     Additional Pertinent Hx: Per ED note, pt \"is a 79 y.o. male who presents to the Emergency Department for the evaluation of cough and shortness of breath. Patient reports gradual onset of symptoms over the past 5 days with significant worsening of the shortness of breath over the past 2 days. He reports unproductive cough associated with decreased sense of taste and smell and some nasal congestion over the past week. Denies any known sick contacts and has not been tested for Covid since symptom onset. He reports worsening of shortness of breath with exertion, improvement with rest.  Reports generalized weakness that occurs with activity. He has been taking Tylenol and was also started on vitamin C, vitamin D, zinc by his daughter earlier in the season. He denies any pulmonary history. He is not a smoker. He denies any associated fevers, chills, generalized body aches, headache, sore throat, vomiting, diarrhea, wheezing, edema, orthopnea or dizziness. \"     Prior Level of Function:  Lives With: Spouse  Type of Home: House  Home Layout: One level  Home Access: Stairs to enter with rails  Entrance Stairs - Number of Steps: 3-4 steps with 1 rail  Home Equipment: (none)   Bathroom Shower/Tub: Tub/Shower unit  Bathroom Toilet: Standard  Bathroom Equipment: Grab bars in shower  Bathroom Accessibility: Accessible    Receives Help From: Family  ADL Assistance: [address] [place] [address] Avenue: Independent  Homemaking Responsibilities: Yes  Ambulation Assistance: Independent  Transfer Assistance: Jorje, has been advised about the message below.   Independent  Active : Yes  Additional Comments: Pt reports being Independent with all mobility in PLOF. Pt shared homemaking with his spouse prior to admission. Pt's spouse is also hospitalized on this same unit due to COVID-19 virus at this time. Restrictions/Precautions:  Restrictions/Precautions: General Precautions, Fall Risk  Position Activity Restriction  Other position/activity restrictions: 1/31 O2 on 3L and 4L with activity; 2/1 4L at rest/activity; 2/4 2L at rest/2-3L with activity     SUBJECTIVE: Patient cooperative and pleasant this date, able to remain on 2L. PAIN: no complaints of pain    OBJECTIVE:  Bed Mobility:  Not Tested    Transfers:  Sit to Stand: Stand By Assistance  Stand to [address] [address]   Verbal cues for hand placement    Ambulation:  Stand By Assistance, [facility] Assistance  Distance: 120', 10'x2  Surface: Level Tile  Device:4 Wheeled Walker  Gait Deviations: Forward Flexed Posture, Slow Emilia, Decreased Step Length Bilaterally, Decreased Gait Speed and Decreased Heel Strike Bilaterally  Verbal cues for increased stride length. Focused on O2 line management     Balance:  Static Standing Balance: Stand By Assistance  Dynamic Standing Balance: Contact Guard Assistance    Functional Outcome Measures: Not completed       ASSESSMENT:  Assessment: Patient progressing toward established goals. Activity Tolerance:  Patient tolerance of  treatment: good. O2 91-93% with activity on 2L O2. Pt requiring 2-3 minutes to recover due to shortness of breath. Equipment Recommendations: Other: monitor for needs - poss RW at discharge?   Discharge Recommendations:  Continue to assess pending progress    Plan: Times per week: 5x BID, 1x QD  Current Treatment Recommendations: Strengthening, Gait Training, Patient/Caregiver Education & Training, Equipment Evaluation, Education, & procurement, Balance Training, Functional Mobility Training, Endurance Training, Transfer Training, Safety Education & Training, Home Exercise Program, Stair training    Patient Education  Patient Education: Transfers, Gait,  - Patient Verbalized Understanding, - Patient Requires Continued Education    Goals:  Patient goals : go home  Short term goals  Time Frame for Short term goals: 1 week  Short term goal 1: Pt to be Mod I for supine <> sit to get in/out of bed  Short term goal 2: Pt to be Supervision for sit <> stand to get up to ambulate  Short term goal 3: Pt to ambulate > 60 ft with RW/4WW with Supervision for household distances  Short term goal 4: Pt to negotiate 4 steps with 1-2 rails with SBA for home access  Long term goals  Time Frame for Long term goals : 3 weeks  Long term goal 1: Pt to be Mod I for supine <> sit without rail to get in/out of bed  Long term goal 2: Pt to be Mod I for sit <> stand to get up to ambulate  Long term goal 3: Pt to ambulate > 150 ft with RW/4WW with Mod I for household and community distances  Long term goal 4: Pt to negotiate 4 steps with 1 rail with Supervision for home access    Following session, patient left in safe position with all fall risk precautions in place.

## 2021-02-05 NOTE — PLAN OF CARE
Problem: Airway Clearance - Ineffective  Goal: Achieve or maintain patent airway  Outcome: Ongoing     Problem: Gas Exchange - Impaired  Goal: Absence of hypoxia  Outcome: Ongoing     Problem: Gas Exchange - Impaired  Goal: Promote optimal lung function  Outcome: Ongoing     Problem: Breathing Pattern - Ineffective  Goal: Ability to achieve and maintain a regular respiratory rate  2/5/2021 0252 by Osmar Thurman RN  Outcome: Ongoing     Problem: Body Temperature -  Risk of, Imbalanced  Goal: Ability to maintain a body temperature within defined limits  Outcome: Ongoing     Problem: Body Temperature -  Risk of, Imbalanced  Goal: Will regain or maintain usual level of consciousness  Outcome: Ongoing     Problem:  Body Temperature -  Risk of, Imbalanced  Goal: Complications related to the disease process, condition or treatment will be avoided or minimized  Outcome: Ongoing     Problem: Risk for Fluid Volume Deficit  Goal: Maintain normal heart rhythm  Outcome: Ongoing     Problem: Risk for Fluid Volume Deficit  Goal: Maintain absence of muscle cramping  Outcome: Ongoing     Problem: Risk for Fluid Volume Deficit  Goal: Maintain normal serum potassium, sodium, calcium, phosphorus, and pH  Outcome: Ongoing     Problem: Loneliness or Risk for Loneliness  Goal: Demonstrate positive use of time alone when socialization is not possible  Outcome: Ongoing     Problem: Fatigue  Goal: Verbalize increase energy and improved vitality  Outcome: Ongoing     Problem: Patient Education: Go to Patient Education Activity  Goal: Patient/Family Education  Outcome: Ongoing     Problem: Falls - Risk of:  Goal: Will remain free from falls  Description: Will remain free from falls  Outcome: Ongoing     Problem: Falls - Risk of:  Goal: Absence of physical injury  Description: Absence of physical injury  Outcome: Ongoing     Problem: Pain:  Goal: Pain level will decrease  Description: Pain level will decrease  Outcome: Ongoing Problem: Pain:  Goal: Control of acute pain  Description: Control of acute pain  Outcome: Ongoing     Problem: Pain:  Goal: Control of chronic pain  Description: Control of chronic pain  Outcome: Ongoing     Problem: Bleeding:  Goal: Will show no signs and symptoms of excessive bleeding  Description: Will show no signs and symptoms of excessive bleeding  Outcome: Ongoing     Problem: Mobility - Impaired:  Goal: Mobility will improve  Description: Mobility will improve  Outcome: Ongoing     Problem: Skin Integrity:  Goal: Skin integrity will stabilize  Description: Skin integrity will stabilize  Outcome: Ongoing     Problem: Discharge Planning:  Goal: Patients continuum of care needs are met  Description: Patients continuum of care needs are met  Outcome: Ongoing     Problem: Skin Integrity:  Goal: Will show no infection signs and symptoms  Description: Will show no infection signs and symptoms  Outcome: Ongoing     Problem: Skin Integrity:  Goal: Absence of new skin breakdown  Description: Absence of new skin breakdown  Outcome: Ongoing     Problem: IP MOBILITY  Goal: LTG - patient will demonstrate safe mobility requirements  Outcome: Ongoing     Problem: IP INSTRUMENTAL ACTIVITIES OF DAILY LIVING  Goal: LTG - Patient will independently complete basic home making activities to return to independent functioning at home  Outcome: Ongoing     Problem: SAFETY  Goal: LTG - Patient will demonstrate safety requirements appropriate to situation/environment  Outcome: Ongoing     Problem: Nutrition  Goal: Optimal nutrition therapy  Outcome: Ongoing   Care plan reviewed with patient. Patient verbalizes understanding of care plan and treatment goals.

## 2021-02-05 NOTE — PROGRESS NOTES
Delivery:  Continue Current Diet  Nutrition Education/Counseling:  Education initiated(2/5 Encouraged po, good nutrition at best efforts. Discussed use of ONS at discharge if intake declines.)   Coordination of Nutrition Care:  Continue to monitor while inpatient    Goals:  Pt will consume 75% or more of meals during LOS       Nutrition Monitoring and Evaluation:   Behavioral-Environmental Outcomes:  None Identified   Food/Nutrient Intake Outcomes:  Diet Advancement/Tolerance, Food and Nutrient Intake  Physical Signs/Symptoms Outcomes:  Biochemical Data, Chewing or Swallowing, GI Status, Fluid Status or Edema, Nutrition Focused Physical Findings, Skin, Weight     Discharge Planning:     Too soon to determine     Electronically signed by Miguel Araya RD, LD on 2/5/21 at 11:44 AM EST    Contact: [phone]

## 2021-02-05 NOTE — PROGRESS NOTES
Patient Name: Pablo Alvarado        MRN: [id number]    : 1940  (79 y.o.)  Gender: male   Principal Problem: <principal problem not specified>    Section D - Mood     Should Resident Mood Interview be Conducted? - Attempt to conduct interview with all residents   0. No (resident is rarely/never understood) à Skip to and complete -, Staff Assessment of Mood (PHQ 9-OV)  1. Yes à Continue to , Resident Mood Interview (PHQ-9) Enter Code    1   . Resident Mood Interview (PHQ-9)   Say to resident: Conrado Jiang the last 2 weeks, have you been bothered by any of the following problems?    If symptom is present, enter 1(yes) in column 1, Symptom Presence. Then move to column 2, Symptom Frequency, and indicate symptom frequency. 1. Symptom Presence                   2. Symptom                                                                  Frequency  0. No (enter 0 in column 2)          0. Never or 1 day          1. Yes (enter 0-3 in column 2)      1. 2-6 days           9. No Response                               2.  7-11 days                                                 3.  12-14 days   1. Symptom Presence 2. Symptom  Frequency        Enter Scores in boxes below   A. Little interest or pleasure in doing things 0 0   B. Feeling down, depressed, or hopeless 1 1   C. Trouble falling or staying asleep, or sleeping too much 1 2   D. Feeling tired or having little energy 1 2   E. Poor appetite or overeating 1 2   F. Feeling bad about yourself - or that you are a failure, or have let your family down 0 0   G. Trouble concentrating on things, such as reading the newspaper or watching television 0 0   H. Moving or speaking so slowly that other people have noticed. Or the opposite-being so fidgety or restless that s/he has been moving around a lot more than usual   0   0   I. Thoughts that you would be better off dead, or of hurting yourself in some way.  0 0              Patient Name: Valentina Duffyence Gera Harper        MRN: [id number]    : 1940  (79 y.o.)  Gender: male   Principal Problem: <principal problem not specified>    Section J    Health Conditions    Should Pain Assessment Interview be Conducted? Attempt to conduct interview with all residents. If resident is comatose, skip to , Shortness of Breath (dyspnea)   Enter Code  1 0 - No à (resident is rarely/never understood) à Skip to [address] of Breath  1 - Yes à Continue to , Pain Presence   Pain Assessment Interview   Pain Presence   Enter Code  1 Ask resident: Solange Ventura you had pain or hurting at any time in the last 5 days?   0 - No à Skip to , Shortness of Breath  1 - Yes  à Continue to , Pain Frequency  9 - Unable to answer à Skip to , Shortness of Breath    Pain Frequency   Enter Code          3 Ask resident: Spring Hernandez much of the time have you experienced pain or hurting over the last 5 days?   1 - Almost constantly  2 - Frequently  3 - Occasionally  4 - Rarely  9 - Unable to answer    Pain Effect on Function   Enter Code      0 Ask resident: Barney Mccray the last 5 days, has pain made it hard for you to sleep at night?   0 - No   1 - Yes   9 - Unable to answer    Enter Code      0 Ask resident: Barney Ana the last 5 days, have you limited your day-to-day activities because of pain?   0 - No   1 - Yes   9 - Unable to answer     Pain Intensity   Enter Code      05 A. Numeric Rating Scale (00-10)  Ask resident: Ivonne Frazier rate your worst pain over the last 5 days on a zero to ten scale, with zero being no pain and ten as the worst pain you can imagine.  (Show resident 00-10 pain scale)  Enter two-digit response. Enter 99 if unable to answer.

## 2021-02-05 NOTE — PROGRESS NOTES
[facility]  INPATIENT PHYSICAL THERAPY  DAILY NOTE  [facility] SKILLED NURSING UNIT - 8E-70/070-A  Time In: 6976  Time Out: 1449  Timed Code Treatment Minutes: 17 Minutes  Minutes: 17          Date: 2021  Patient Name: Martine Benedict,  Gender:  male        MRN: [id number]  : 1940  (79 y.o.)     Referring Practitioner: Ordering: Geri Carrillo MD; Attending: Shantell Miles MD     Additional Pertinent Hx: Per ED note, pt \"is a 79 y.o. male who presents to the Emergency Department for the evaluation of cough and shortness of breath. Patient reports gradual onset of symptoms over the past 5 days with significant worsening of the shortness of breath over the past 2 days. He reports unproductive cough associated with decreased sense of taste and smell and some nasal congestion over the past week. Denies any known sick contacts and has not been tested for Covid since symptom onset. He reports worsening of shortness of breath with exertion, improvement with rest.  Reports generalized weakness that occurs with activity. He has been taking Tylenol and was also started on vitamin C, vitamin D, zinc by his daughter earlier in the season. He denies any pulmonary history. He is not a smoker. He denies any associated fevers, chills, generalized body aches, headache, sore throat, vomiting, diarrhea, wheezing, edema, orthopnea or dizziness. \"     Prior Level of Function:  Lives With: Spouse  Type of Home: House  Home Layout: One level  Home Access: Stairs to enter with rails  Entrance Stairs - Number of Steps: 3-4 steps with 1 rail  Home Equipment: (none)   Bathroom Shower/Tub: Tub/Shower unit  Bathroom Toilet: Standard  Bathroom Equipment: Grab bars in shower  Bathroom Accessibility: Accessible    Receives Help From: Family  ADL Assistance: [facility] [address]: Independent  Homemaking Responsibilities: Yes  Ambulation Assistance: Independent  Transfer Assistance: Independent  Active : Yes  Additional Comments: Pt reports being Independent with all mobility in PLOF. Pt shared homemaking with his spouse prior to admission. Pt's spouse is also hospitalized on this same unit due to COVID-19 virus at this time. Restrictions/Precautions:  Restrictions/Precautions: General Precautions, Fall Risk  Position Activity Restriction  Other position/activity restrictions: 1/31 O2 on 3L and 4L with activity; 2/1 4L at rest/activity; 2/4 2L at rest/2-3L with activity     SUBJECTIVE: Patient in room in chair, agreeable to PT. Pt cooperative and pleasant, requesting tylenol for back pain      PAIN: low back, not rated, nurse notified of pt request for tylenol    OBJECTIVE:  Bed Mobility:  Rolling to Right: Stand By Assistance   Supine to Sit: Stand By Assistance, [employer] Assistance, Minimal Assistance  Sit to Supine: Contact Guard Assistance, Minimal Assistance    -Treatment focused on bed mobility this date. Trialled leg  with no bed rail, pt able to lift legs into bed but with significant difficulty and some back pain. Pt required min assist into bed with no bed rail. With bed rail pt able to complete with SBA/CGA with head of bed flat. Verbal cues for positioning to increase ease. Education on benefit of log roll technique. Education on attempting bed mobility with staff supervision before staff assisting pt to increase mobility and independence. Discussed possibility of getting bed rail for home and options shown and discussed. Plan to review need next week and possibly discuss with pt's family. Transfers:  Sit to Stand: Stand By Assistance  Stand to Sit:Stand By Assistance    Ambulation:  Stand By Assistance, [employer] Assistance  Distance: 5', 12', 7'  Surface: Level Tile  Device:4 Wheeled Walker  Gait Deviations:   Forward Flexed Posture, Slow Emilia, Decreased Step Length Bilaterally, Decreased Gait Speed and Decreased Heel Strike Bilaterally  -Verbal cues for O2 line management  -Education on ambulating with staff over weekend to increase mobility. Balance:  Static Sitting Balance:  Modified Independent  Dynamic Sitting Balance: Supervision  Static Standing Balance: Stand By Assistance  Dynamic Standing Balance: Stand By Assistance, Contact Guard Assistance    Functional Outcome Measures: Not completed       ASSESSMENT:  Assessment: Patient progressing toward established goals. Activity Tolerance:  Patient tolerance of  treatment: good. O2 93% and greater on 2L O2. Equipment Recommendations: Other: monitor for needs - poss RW at discharge? Discharge Recommendations:  Continue to assess pending progress    Plan: Times per week: 5x BID, 1x QD  Current Treatment Recommendations: Strengthening, Gait Training, Patient/Caregiver Education & Training, Equipment Evaluation, Education, & procurement, Balance Training, Functional Mobility Training, Endurance Training, Transfer Training, Safety Education & Training, Home Exercise Program, Stair training    Patient Education  Patient Education: [employer], Gait,  - Patient Verbalized Understanding, - Patient Requires Continued Education    Goals:  Patient goals : go home  Short term goals  Time Frame for Short term goals: 2 weeks  Short term goal 1: Pt to be SBA for supine <> sit with no bed rail to get in/out of bed with decreased difficulty.   Short term goal 2: Pt to be Supervision for sit <> stand to get up to ambulate  Short term goal 3: Pt to ambulate > 60 ft with RW/4WW with Supervision for household distances  Short term goal 4: Pt to negotiate 4 steps with 1-2 rails with SBA for home access  Long term goals  Time Frame for Long term goals : 3 weeks  Long term goal 1: Pt to be Mod I for supine <> sit without rail to get in/out of bed  Long term goal 2: Pt to be Mod I for sit <> stand to get up to ambulate  Long term goal 3: Pt to ambulate > 150 ft with RW/4WW with Mod I for household and community distances  Long term goal 4: Pt to negotiate 4 steps with 1 rail with Supervision for home access    Following session, patient left in safe position with all fall risk precautions in place.

## 2021-02-05 NOTE — PLAN OF CARE
Problem: Nutrition  Goal: Optimal nutrition therapy  2/5/2021 1142 by Leonie Mcburney, RD, LD  Outcome: Ongoing  Nutrition Problem #1: Inadequate oral intake  Intervention: Food and/or Nutrient Delivery: Continue Current Diet  Nutritional Goals: Pt will consume 75% or more of meals during LOS

## 2021-02-05 NOTE — PLAN OF CARE
Problem: Breathing Pattern - Ineffective  Goal: Ability to achieve and maintain a regular respiratory rate  Outcome: Ongoing  Note: Unable to wean patients oxygen this day. Patient continues to use acapella and incentive spirometer for lung volume expansion. Remains afebrile. Ambulates in [place] with staff.

## 2021-02-05 NOTE — PROGRESS NOTES
in shower to wash face  Bathing: Stand By Assistance. while standing to wash bottom  Upper Extremity Dressing: Supervision and with set-up.  while seated  Lower Extremity Dressing: Minimal Assistance. socks  Tub Transfer: Contact Guard Assistance. tub shower with shower chair. BALANCE:  Sitting Balance:  Supervision. Standing Balance: Stand By Assistance, [employer]. BED MOBILITY:  Not Tested    TRANSFERS:  Sit to Stand:  Stand By Assistance  Stand to Sit: Stand By Assistance. FUNCTIONAL MOBILITY:  Assistive Device: 4 Wheeled Walker  Assist Level:  Stand By Assistance. Distance: To and from bathroom and To and from shower room  Able to maintain O2 throughout       ASSESSMENT:  Activity Tolerance:  Patient tolerance of  treatment: good. Assessment:   Pt is making good progress towards goals. Pt has met 2/4 STGs and 0/2 LTGs. Pt has exhibited improvement in HEP and functional mobility while maintaining O2 sats. Pt continues to exhibit decreased balance, endurance, safety awareness, and activity tolerance causing barriers to meeting pt's goals. Pt continues to require skilled OT intervention to improve ADL/IADL performance required for pt to return home at [facility].     Discharge Recommendations: Continue to assess pending progress, Home with Home health OT  Equipment Recommendations: Equipment Needed: Yes  Other: Patient would benefit from shower chair, pulse ox  Plan: Times per week: 6x  Current Treatment Recommendations: Functional Mobility Training, Endurance Training, Self-Care / ADL, Patient/Caregiver Education & Training, Strengthening, Balance Training, Safety Education & Training, Equipment Evaluation, Education, & procurement, Home Management Training    Patient Education  Patient Education: ADL's, Energy Conservation and Home Safety    Goals  Short term goals  Time Frame for Short term goals: 1 week  Short term goal 1: Pt will complete BUE ROM/light resistive exercises with min vcs for technique to increase indep and endurance with all self cares and IADLs. MET, REVISE    Short term goal 2: Pt will complete standing task x 3 minutes with 1-2 UE release and SBA while o2 stats remain avove 90% to increase indep and endurance with grooming and simple IADLs. NOT MET, CONTINUE  Short term goal 3: Pt will complete functional mobility HH distances with SBA and while O2 stats remain above 90% to increase stength and endurance with all self cares and grocery shopping. MET, REVISE    Short term goal 4: Pt will complete LB dressing with S, O2 stats remaining above 90% and 0 vcs for safety/taking rest breaks to increase indep in home environment. NOT MET, CONTINUE    Long term goals  Time Frame for Long term goals : 3 weeks  Long term goal 1: Pt will complete IADL task Mod Indep while demostrating EC techniques to increase endurance and safety with all cooking and laundry in home environment. NOT MET, CONTINUE    Long term goal 2: Pt will complete ADL routine including shower (when out of droplet +) with Mod indep while demonstrating EC techniques to increase endurance with all self cares. NOT MET, CONTINUE      Following session, patient left in safe position with all fall risk precautions in place.

## 2021-02-05 NOTE — PROGRESS NOTES
[address] [address]  INPATIENT PHYSICAL THERAPY  Progress Note  [facility] SKILLED NURSING UNIT - 8E-70/070-A  Time In: 2644  Time Out: 1233  Timed Code Treatment Minutes: 61 Minutes  Minutes: 59          Date: 2021  Patient Name: Gayla Medina,  Gender:  male        MRN: [id number]  : 1940  (79 y.o.)     Referring Practitioner: Ordering: Benjamín Gonzalez MD; Attending: Shea Berrios MD     Additional Pertinent Hx: Per ED note, pt \"is a 79 y.o. male who presents to the Emergency Department for the evaluation of cough and shortness of breath. Patient reports gradual onset of symptoms over the past 5 days with significant worsening of the shortness of breath over the past 2 days. He reports unproductive cough associated with decreased sense of taste and smell and some nasal congestion over the past week. Denies any known sick contacts and has not been tested for Covid since symptom onset. He reports worsening of shortness of breath with exertion, improvement with rest.  Reports generalized weakness that occurs with activity. He has been taking Tylenol and was also started on vitamin C, vitamin D, zinc by his daughter earlier in the season. He denies any pulmonary history. He is not a smoker. He denies any associated fevers, chills, generalized body aches, headache, sore throat, vomiting, diarrhea, wheezing, edema, orthopnea or dizziness. \"     Prior Level of Function:  Lives With: Spouse  Type of Home: House  Home Layout: One level  Home Access: Stairs to enter with rails  Entrance Stairs - Number of Steps: 3-4 steps with 1 rail  Home Equipment: (none)    Restrictions/Precautions:  Restrictions/Precautions: General Precautions, Fall Risk  Position Activity Restriction  Other position/activity restrictions:  O2 on 3L and 4L with activity; 2/1 4L at rest/activity; 2/4 2L at rest/2-3L with activity    SUBJECTIVE: Patient in room in chair, agreeable to PT. Pt anxious to return to home. Discussed importance of continued therapy at this time. PAIN: low back with bed mobility, pt noting pain comes and goes    OBJECTIVE:  Bed Mobility:  Rolling to Right: Stand By Assistance   Supine to Sit: Stand By Assistance with bed rail  Sit to Supine: Stand By Assistance with bed rail  *Patient requiring min assist with supine < > sit without bed rail. Pt anxious with bed mobility due to back pain. Education on possible recommendation for bed rail at home. Cues for positioning with bed mobility this date    Transfers:  Sit to Stand: Stand By Assistance  Stand to Sit:Stand By Assistance, [employer] Assistance    Ambulation:  Stand By Assistance, [employer] Assistance  Distance: 70', ~10'x8, 70'  Surface: Level Tile  Device:4 Wheeled Walker  Gait Deviations: Forward Flexed Posture, Slow Emilia, Decreased Step Length Bilaterally, Decreased Gait Speed and Decreased Heel Strike Bilaterally  Verbal cues for upright posture and increased stride length   -Patient ambulating to multiple surfaces with 4WW, with focus on managing O2 line. Pt requiring only min verbal cues for improved safety, good awareness of O2 line. Stairs:  Contact Guard Assistance  Number of Steps: 4  Height: 6\" step with One Handrail   Verbal cues to utilize bilateral hands on right hand rail    Exercise:  Patient was guided in 1 set exercise to both lower extremities. Standing: step ups 6\" step x 8 each LE, march with emphasis on increased hip flexion, hamstring curls, sit < > stand x 5 reps with no UE support. Exercises were completed for increased independence with functional mobility. Functional Outcome Measures: Completed   Timed up and Go Test (TUG)  34 seconds with a 4WW.  Increased time for O2 line management      Normative Reference Values  60-69 years:  8.1 seconds  70-79 years: 9.2 seconds  80-99 years: 11.3 seconds    < 10 seconds is normal, a score of > 14 seconds indicates high fall risk ASSESSMENT:  Assessment:   . Patient met 0/4 STG's and 0/4 LTG's. Patient has not met any goals at this time, however is demonstrating gradual improvements with mobility over the last week. Patient is gradually able to ambulate increased distances with 4WW, progressing sit < > stand to SBA with occasional CGA and cues for safety and has decreased O2 needs from 3-4L to 2-3L. Patient continues to be short of breath with activity, requiring frequent rest breaks. Patient would continue to benefit from skilled PT services to improve his ability to complete functional mobility, reduce his risk for falls and allow patient to return to PLOF. Activity Tolerance:  Patient tolerance of  treatment: good. O2 89-93% with activity on 2L, recovering to 92% and greater within 30-45 seconds when decreasing to 89-91 range. Occasional verbal cues for pursed lip breathing. Rest breaks with activity due to shortness of breath. Equipment Recommendations: Other: monitor for needs - poss RW at discharge?   Discharge Recommendations:  Discharge Recommendations: Continue to assess pending progress    Plan: Times per week: 5x BID, 1x QD  Current Treatment Recommendations: Strengthening, Gait Training, Patient/Caregiver Education & Training, Equipment Evaluation, Education, & procurement, Balance Training, Functional Mobility Training, Endurance Training, Transfer Training, Safety Education & Training, Home Exercise Program, Stair training    Patient Education  Patient Education: [employer], Transfers, Gait, Stairs, Verbal Exercise Instruction,  - Patient Verbalized Understanding, - Patient Requires Continued Education    Goals:  Patient goals : go home  Short term goals  Time Frame for Short term goals: 1 week  Short term goal 1: Pt to be Mod I for supine <> sit to get in/out of bed GOAL NOT MET, CONTINUE  Short term goal 2: Pt to be Supervision for sit <> stand to get up to ambulate GOAL NOT MET, CONTINUE  Short term goal 3: Pt to ambulate > 60 ft with RW/4WW with Supervision for household distances GOAL NOT MET, CONTINUE  Short term goal 4: Pt to negotiate 4 steps with 1-2 rails with SBA for home access GOAL NOT MET, CONTINUE  Long term goals  Time Frame for Long term goals : 3 weeks  Long term goal 1: Pt to be Mod I for supine <> sit without rail to get in/out of bed GOAL NOT MET, CONTINUE  Long term goal 2: Pt to be Mod I for sit <> stand to get up to ambulate GOAL NOT MET, CONTINUE  Long term goal 3: Pt to ambulate > 150 ft with RW/4WW with Mod I for household and community distances GOAL NOT MET, CONTINUE  Long term goal 4: Pt to negotiate 4 steps with 1 rail with Supervision for home access GOAL NOT MET, CONTINUE     Revised Short-Term Goals:    Short term goals  Time Frame for Short term goals: 2 weeks  Short term goal 1: Pt to be SBA for supine <> sit with no bed rail to get in/out of bed with decreased difficulty.   Short term goal 2: Pt to be Supervision for sit <> stand to get up to ambulate  Short term goal 3: Pt to ambulate > 60 ft with RW/4WW with Supervision for household distances  Short term goal 4: Pt to negotiate 4 steps with 1-2 rails with SBA for home access

## 2021-02-06 PROCEDURE — 97110 THERAPEUTIC EXERCISES: CPT

## 2021-02-06 PROCEDURE — 6360000002 HC RX W HCPCS: Performed by: FAMILY MEDICINE

## 2021-02-06 PROCEDURE — 1290000000 HC SEMI PRIVATE OTHER R&B

## 2021-02-06 PROCEDURE — 97535 SELF CARE MNGMENT TRAINING: CPT

## 2021-02-06 PROCEDURE — 6370000000 HC RX 637 (ALT 250 FOR IP): Performed by: INTERNAL MEDICINE

## 2021-02-06 PROCEDURE — 97530 THERAPEUTIC ACTIVITIES: CPT

## 2021-02-06 PROCEDURE — 97116 GAIT TRAINING THERAPY: CPT

## 2021-02-06 RX ADMIN — ASPIRIN 81 MG: 81 TABLET, CHEWABLE ORAL at 07:53

## 2021-02-06 RX ADMIN — OXYCODONE HYDROCHLORIDE AND ACETAMINOPHEN 500 MG: 500 TABLET ORAL at 07:53

## 2021-02-06 RX ADMIN — Medication 1000 UNITS: at 07:53

## 2021-02-06 RX ADMIN — PANTOPRAZOLE SODIUM 40 MG: 40 TABLET, DELAYED RELEASE ORAL at 06:04

## 2021-02-06 RX ADMIN — ENOXAPARIN SODIUM 30 MG: 30 INJECTION SUBCUTANEOUS at 07:54

## 2021-02-06 RX ADMIN — AMLODIPINE BESYLATE 5 MG: 5 TABLET ORAL at 07:53

## 2021-02-06 RX ADMIN — Medication 50 MG: at 07:53

## 2021-02-06 RX ADMIN — ACETAMINOPHEN 650 MG: 325 TABLET ORAL at 19:27

## 2021-02-06 RX ADMIN — ENOXAPARIN SODIUM 30 MG: 30 INJECTION SUBCUTANEOUS at 19:27

## 2021-02-06 RX ADMIN — LOSARTAN POTASSIUM 100 MG: 100 TABLET, FILM COATED ORAL at 07:53

## 2021-02-06 RX ADMIN — ACETAMINOPHEN 650 MG: 325 TABLET ORAL at 07:53

## 2021-02-06 ASSESSMENT — PAIN DESCRIPTION - PROGRESSION
CLINICAL_PROGRESSION: NOT CHANGED

## 2021-02-06 ASSESSMENT — PAIN SCALES - GENERAL: PAINLEVEL_OUTOF10: 3

## 2021-02-06 ASSESSMENT — PAIN DESCRIPTION - ONSET: ONSET: ON-GOING

## 2021-02-06 ASSESSMENT — PAIN DESCRIPTION - LOCATION: LOCATION: BACK

## 2021-02-06 ASSESSMENT — PAIN - FUNCTIONAL ASSESSMENT: PAIN_FUNCTIONAL_ASSESSMENT: ACTIVITIES ARE NOT PREVENTED

## 2021-02-06 ASSESSMENT — PAIN DESCRIPTION - ORIENTATION
ORIENTATION: LEFT
ORIENTATION: LOWER

## 2021-02-06 ASSESSMENT — PAIN DESCRIPTION - PAIN TYPE: TYPE: ACUTE PAIN

## 2021-02-06 NOTE — PROGRESS NOTES
[address] [address]  [address] [place] SKILLED NURSING UNIT  Occupational Therapy  Daily Note  Time:   Time In: 6935  Time Out: 1220  Timed Code Treatment Minutes: 54 Minutes  Minutes: 55          Date: 2021  Patient Name: Erick Esteban,   Gender: male      Room: [facility]/070-  MRN: [id number]  : 1940  (79 y.o.)  Referring Practitioner: Mukund De La Rosa MD Attending: Dr Alexia Cleveland  Diagnosis: [place] 19  Additional Pertinent Hx: Pt presents to the Emergency Department for the evaluation of cough and shortness of breath. Patient reports gradual onset of symptoms over the past 5 days with significant worsening of the shortness of breath over the past 2 days. He reports unproductive cough associated with decreased sense of taste and smell and some nasal congestion over the past week. Denies any known sick contacts and has not been tested for Covid since symptom onset. He reports worsening of shortness of breath with exertion, improvement with rest.  Reports generalized weakness that occurs with activity. Pt admitted to TCU on 21 for further therapy. Restrictions/Precautions:  Restrictions/Precautions: General Precautions, Fall Risk  Position Activity Restriction  Other position/activity restrictions:  O2 on 3L and 4L with activity; 2/1 4L at rest/activity; 2/4 2L at rest/2-3L with activity--- 2/6 .5L at rest and 1L with activity     SUBJECTIVE: Patient seated in bedside chair upon arrival. Agreeable to OT session    PAIN: Denies    COGNITION: WFL    ADL:   Grooming: Stand By Assistance. Standing sinkside to complete oral care and wash hands after toileting tasks  Toileting: Stand By Assistance. For hygiene and clothing management- increased time with use of bathroom  Toilet Transfer: Stand By Assistance. Antonette Olivo BALANCE:  Sitting Balance:  Modified Independent. Standing Balance: Contact Guard Assistance.  - SBA     BED MOBILITY:  Not Tested    TRANSFERS:  Sit to Stand:  Stand By Assistance. From various surfaces  Stand to Sit: Stand By Assistance. To various surfaces    FUNCTIONAL MOBILITY:  Assistive Device: 4 Wheeled Walker  Assist Level:  Stand By Assistance. Distance: To and from bathroom, To and from therapy gym and throughout therapy kitchen  Slow pace, no LOB noted. Required seated rest break after each trial of mobility. 2 vcs for management of O2 tubing. ADDITIONAL ACTIVITIES:  Patient identified a personal goal to increase UB strength and improve overall endurance so they can complete their toilet & shower transfers; skilled edu on UE strengthening and patient completed BUE strengthening exercises x20 reps x1 set this date with a 1# dumb bell in all distal joints and all planes. Completed BUE table top shoulder exercises x15 reps x1 set, holding each for approx 2-3 seconds. Patient tolerated fair, requiring rest breaks. Pt required cues for technique. Patient completed IADL homemaking task this date of making microwave macaroni and cheese cup- task was graded to challenge balance, safety, and activity tolerance. Patient was able to retrieve all items from graded heights with CGA for balance and 1 VCs for safety during the retrieval and transportation of items. Patient required 1 seated rest break throughout task and 1 seated rest break after task with a standing endurance of 5 minutes x1 trial and 3 minutes x1 trial. Patient required minimal VCs for management of O2 tubing. Patient on 0.5 L O2 via Nasal [place]  upon arrival to room. Patient O2 sats at 95 %. Increased to 1L with activity -Upon activity patient maintaining O2 at 90-94 %. Pt requiring rest break(s) to recover. ASSESSMENT:     Activity Tolerance:  Patient tolerance of  treatment: fair.        Discharge Recommendations: Continue to assess pending progress, Home with Home health OT   Equipment Recommendations: Equipment Needed: Yes  Other: Patient would benefit from shower chair, pulse ox  Plan: Times per week: 6x  Current Treatment Recommendations: Functional Mobility Training, Endurance Training, Self-Care / ADL, Patient/Caregiver Education & Training, Strengthening, Balance Training, Safety Education & Training, Equipment Evaluation, Education, & procurement, Home Management Training    Patient Education  Patient Education: safety with transfers and mobility, IADL strategies while managing O2 tubing, BUE exercises including table top exercises    Goals  Short term goals  Time Frame for Short term goals: 1 week  Short term goal 1: Pt will complete BUE ROM/light resistive exercises with min vcs for technique to increase indep and endurance with all self cares and IADLs. Short term goal 2: Pt will complete standing task x 3 minutes with 1-2 UE release and SBA while o2 stats remain avove 90% to increase indep and endurance with grooming and simple IADLs. Short term goal 3: Pt will complete functional mobility HH distances with SBA and whil O2 stats remain above 90% to increase stength and endurance with all self cares and grocery shopping. Short term goal 4: Pt will complete LB dressing with S, O2 stats remaining above 90% and 0 vcs for safety/taking rest breaks to increase indep in home environment. Long term goals  Time Frame for Long term goals : 3 weeks  Long term goal 1: Pt will complete IADL task Mod Indep while demostrating EC techniques to increase endurance and safety with all cooking and laundry in home environment. Long term goal 2: Pt will complete ADL routine including shower (when out of droplet +) with Mod indep while demonstrating EC techniques to increase endurance with all self cares. Following session, patient left in safe position with all fall risk precautions in place.

## 2021-02-06 NOTE — PROGRESS NOTES
Ambulated with this nurse around unit without oxygen. Was only on 0.5 liters and sPO2 was 96%. SpO2 did drop to 79% when we were just about back to his room. Oxygen re-applied at 0.5 liters and saturations came up within 1 minute to 92%. Denied shortness of breath during walk.

## 2021-02-06 NOTE — PLAN OF CARE
Problem: Gas Exchange - Impaired  Goal: Absence of hypoxia  2/6/2021 1145 by Blade Rm RN  Outcome: Ongoing  Remains on oxygen at 1 liters with saturations > 92%    Problem: Body Temperature -  Risk of, Imbalanced  Goal: Ability to maintain a body temperature within defined limits  2/6/2021 1145 by Blade Rm RN  Outcome: Ongoing  Afebrile    Problem: Falls - Risk of:  Goal: Will remain free from falls  Description: Will remain free from falls  2/6/2021 1145 by Blade Rm RN  Outcome: Ongoing  Falling star prevention in place. Bed and chair alarms in use. Call light in reach. Purposeful hourly rounding. Problem: Bleeding:  Goal: Will show no signs and symptoms of excessive bleeding  Description: Will show no signs and symptoms of excessive bleeding  2/6/2021 1145 by Blade Rm RN  Outcome: Ongoing  No signs of excessive bleeding noted this shift. Problem: Mobility - Impaired:  Goal: Mobility will improve  Description: Mobility will improve  2/6/2021 1145 by Blade Rm RN  Outcome: Ongoing  Patient continues with therapies and is working toward discharge goals. Problem: Skin Integrity:  Goal: Skin integrity will stabilize  Description: Skin integrity will stabilize  2/6/2021 1145 by Blade Rm RN  Outcome: Ongoing  Skin assessed daily for breakdown. Wounds monitored for healing. Problem: Skin Integrity:  Goal: Absence of new skin breakdown  Description: Absence of new skin breakdown  2/6/2021 1145 by Blade Rm RN  Outcome: Ongoing  No new skin breakdown noted since admission. Problem: Nutrition  Goal: Optimal nutrition therapy  2/6/2021 1145 by Blade Rm RN  Outcome: Ongoing  Tolerating current diet and po fluids well.

## 2021-02-06 NOTE — PROGRESS NOTES
Independent  Active : Yes  Additional Comments: Pt reports being Independent with all mobility in PLOF. Pt shared homemaking with his spouse prior to admission. Pt's spouse is also hospitalized on this same unit due to COVID-19 virus at this time. Restrictions/Precautions:  Restrictions/Precautions: General Precautions, Fall Risk  Position Activity Restriction  Other position/activity restrictions: 1/31 O2 on 3L and 4L with activity; 2/1 4L at rest/activity; 2/4 2L at rest/2-3L with activity--- 2/6 .5L at rest and with activity       SUBJECTIVE: pt up in chair he was cooperative for therapy - did monitor o2 sats throughout session and provided rest breaks when needed     PAIN: 0/10:       OBJECTIVE:  Bed Mobility:  Not Tested    Transfers:  Sit to Stand: Stand By Assistance  Stand to Sit:Stand By Assistance  ** pt demonstrated good awareness of his O2 line when going to turn to back upto surface   Ambulation:  Stand By Assistance  Distance: 135x1, 70x1 and short distances in room   Surface: Level Tile  Device 4 wheeled walker   Gait Deviations:  Slow Emilia he needed assist for O2 tank and occ cues for O2 awareness   Stairs:  Stand By Assistance  Number of Steps: 4  Height: 6\" step with Bilateral Handrails    Balance:  pt completed dynamic balance activity w/ one to no UE at support pt reaching above head and to knee ht and > 3-4 in out of base of support with SBA he was able to stand for ~ 2 1/2 min at a time     Exercise:  Patient was guided in 1 set(s) 12 reps of exercise to both lower extremities. Long arc quads, Standing heel/toe raises, Standing marches, Standing hip abduction/adduction, Standing hip extension, Standing hamstring curls, Standing hip flexion, Mini squats and standing upper trunk rotations, standing shoulder horz abd/add with breathing technique- pt took several rest breaks during exersises .   Exercises were completed for increased independence with functional mobility. Functional Outcome Measures: Not completed       ASSESSMENT:  Assessment: Patient progressing toward established goals. Activity Tolerance:  Patient tolerance of  treatment: fair. Required rest breaks pt on . 5L O2 throughout session on occ he dropped below 88% however recovered within sec and deep breathing and postural ex      Equipment Recommendations: Other: monitor for needs - poss RW at discharge? Discharge Recommendations:    Continue to assess pending progress    Plan: Times per week: 5x BID, 1x QD  Current Treatment Recommendations: Strengthening, Gait Training, Patient/Caregiver Education & Training, Equipment Evaluation, Education, & procurement, Balance Training, Functional Mobility Training, Endurance Training, Transfer Training, Safety Education & Training, Home Exercise Program, Stair training    Patient Education  Patient Education: Plan of Care  Goals:  Patient goals : go home  Short term goals  Time Frame for Short term goals: 2 weeks  Short term goal 1: Pt to be SBA for supine <> sit with no bed rail to get in/out of bed with decreased difficulty. Short term goal 2: Pt to be Supervision for sit <> stand to get up to ambulate  Short term goal 3: Pt to ambulate > 60 ft with RW/4WW with Supervision for household distances  Short term goal 4: Pt to negotiate 4 steps with 1-2 rails with SBA for home access  Long term goals  Time Frame for Long term goals : 3 weeks  Long term goal 1: Pt to be Mod I for supine <> sit without rail to get in/out of bed  Long term goal 2: Pt to be Mod I for sit <> stand to get up to ambulate  Long term goal 3: Pt to ambulate > 150 ft with RW/4WW with Mod I for household and community distances  Long term goal 4: Pt to negotiate 4 steps with 1 rail with Supervision for home access    Following session, patient left in safe position with all fall risk precautions in place.

## 2021-02-07 PROCEDURE — 6370000000 HC RX 637 (ALT 250 FOR IP): Performed by: INTERNAL MEDICINE

## 2021-02-07 PROCEDURE — 1290000000 HC SEMI PRIVATE OTHER R&B

## 2021-02-07 PROCEDURE — 6360000002 HC RX W HCPCS: Performed by: FAMILY MEDICINE

## 2021-02-07 RX ADMIN — PANTOPRAZOLE SODIUM 40 MG: 40 TABLET, DELAYED RELEASE ORAL at 08:26

## 2021-02-07 RX ADMIN — ENOXAPARIN SODIUM 30 MG: 30 INJECTION SUBCUTANEOUS at 19:26

## 2021-02-07 RX ADMIN — AMLODIPINE BESYLATE 5 MG: 5 TABLET ORAL at 08:26

## 2021-02-07 RX ADMIN — Medication 50 MG: at 08:26

## 2021-02-07 RX ADMIN — ASPIRIN 81 MG: 81 TABLET, CHEWABLE ORAL at 08:26

## 2021-02-07 RX ADMIN — LOSARTAN POTASSIUM 100 MG: 100 TABLET, FILM COATED ORAL at 08:26

## 2021-02-07 RX ADMIN — ENOXAPARIN SODIUM 30 MG: 30 INJECTION SUBCUTANEOUS at 08:26

## 2021-02-07 RX ADMIN — Medication 1000 UNITS: at 08:26

## 2021-02-07 RX ADMIN — ACETAMINOPHEN 650 MG: 325 TABLET ORAL at 21:05

## 2021-02-07 RX ADMIN — OXYCODONE HYDROCHLORIDE AND ACETAMINOPHEN 500 MG: 500 TABLET ORAL at 08:26

## 2021-02-07 ASSESSMENT — PAIN DESCRIPTION - ONSET: ONSET: ON-GOING

## 2021-02-07 ASSESSMENT — PAIN DESCRIPTION - PROGRESSION
CLINICAL_PROGRESSION: NOT CHANGED

## 2021-02-07 ASSESSMENT — PAIN DESCRIPTION - DESCRIPTORS: DESCRIPTORS: ACHING

## 2021-02-07 ASSESSMENT — PAIN DESCRIPTION - PAIN TYPE: TYPE: ACUTE PAIN

## 2021-02-07 ASSESSMENT — PAIN DESCRIPTION - FREQUENCY: FREQUENCY: INTERMITTENT

## 2021-02-07 ASSESSMENT — PAIN DESCRIPTION - LOCATION: LOCATION: BACK

## 2021-02-07 NOTE — PROGRESS NOTES
Ambulated once around unit with 0.5 L oxygen. Before walk, O2 saturation was 98%. After walk, saturation was 86% but recovered within 1 minute up to 95%. Tolerated well. Denied shortness of breath.

## 2021-02-07 NOTE — PLAN OF CARE
Problem: Gas Exchange - Impaired  Goal: Absence of hypoxia  Outcome: Ongoing      Remains on oxygen at 0.5 liters with saturations > 92%    Problem: Gas Exchange - Impaired  Goal: Promote optimal lung function  Outcome: Ongoing  Patient uses incentive spirometer and acapella     Problem: Body Temperature -  Risk of, Imbalanced  Goal: Ability to maintain a body temperature within defined limits  Outcome: Ongoing  Afebrile    Problem: Loneliness or Risk for Loneliness  Goal: Demonstrate positive use of time alone when socialization is not possible  Outcome: Ongoing  Talks with family members in spare time and watches television    Problem: Fatigue  Goal: Verbalize increase energy and improved vitality  Outcome: Ongoing  States that he is feeling stronger with  more energy     Problem: Falls - Risk of:  Goal: Will remain free from falls  Description: Will remain free from falls  Outcome: Ongoing  Falling star prevention in place. Bed and chair alarms in use. Call light in reach. Purposeful hourly rounding. Ambulates with standby assist with gate belt and 4 wheel walker with some shuffling of feet    Problem: Pain:  Goal: Control of acute pain  Description: Control of acute pain  Outcome: Ongoing  Did complain of some acute back pain from \"over doing it in therapy\" the last several days    Problem: Bleeding:  Goal: Will show no signs and symptoms of excessive bleeding  Description: Will show no signs and symptoms of excessive bleeding  Outcome: Ongoing  No signs of excessive bleeding noted this shift. Problem: Mobility - Impaired:  Goal: Mobility will improve  Description: Mobility will improve  Outcome: Ongoing  Patient continues with therapies and is working toward discharge goals. Problem: Skin Integrity:  Goal: Absence of new skin breakdown  Description: Absence of new skin breakdown  Outcome: Ongoing   No new skin breakdown noted since admission.     Problem: Nutrition  Goal: Optimal nutrition

## 2021-02-08 PROCEDURE — 97116 GAIT TRAINING THERAPY: CPT

## 2021-02-08 PROCEDURE — 97110 THERAPEUTIC EXERCISES: CPT

## 2021-02-08 PROCEDURE — 6370000000 HC RX 637 (ALT 250 FOR IP): Performed by: INTERNAL MEDICINE

## 2021-02-08 PROCEDURE — 6360000002 HC RX W HCPCS: Performed by: FAMILY MEDICINE

## 2021-02-08 PROCEDURE — 97535 SELF CARE MNGMENT TRAINING: CPT

## 2021-02-08 PROCEDURE — 1290000000 HC SEMI PRIVATE OTHER R&B

## 2021-02-08 PROCEDURE — 97530 THERAPEUTIC ACTIVITIES: CPT

## 2021-02-08 RX ADMIN — AMLODIPINE BESYLATE 5 MG: 5 TABLET ORAL at 08:22

## 2021-02-08 RX ADMIN — Medication 50 MG: at 08:23

## 2021-02-08 RX ADMIN — ENOXAPARIN SODIUM 30 MG: 30 INJECTION SUBCUTANEOUS at 08:23

## 2021-02-08 RX ADMIN — ACETAMINOPHEN 650 MG: 325 TABLET ORAL at 20:15

## 2021-02-08 RX ADMIN — LOSARTAN POTASSIUM 100 MG: 100 TABLET, FILM COATED ORAL at 08:23

## 2021-02-08 RX ADMIN — OXYCODONE HYDROCHLORIDE AND ACETAMINOPHEN 500 MG: 500 TABLET ORAL at 10:00

## 2021-02-08 RX ADMIN — ASPIRIN 81 MG: 81 TABLET, CHEWABLE ORAL at 08:23

## 2021-02-08 RX ADMIN — ACETAMINOPHEN 650 MG: 325 TABLET ORAL at 05:59

## 2021-02-08 RX ADMIN — ENOXAPARIN SODIUM 30 MG: 30 INJECTION SUBCUTANEOUS at 20:15

## 2021-02-08 RX ADMIN — PANTOPRAZOLE SODIUM 40 MG: 40 TABLET, DELAYED RELEASE ORAL at 05:59

## 2021-02-08 RX ADMIN — Medication 1000 UNITS: at 08:23

## 2021-02-08 ASSESSMENT — PAIN SCALES - GENERAL
PAINLEVEL_OUTOF10: 1
PAINLEVEL_OUTOF10: 0
PAINLEVEL_OUTOF10: 1
PAINLEVEL_OUTOF10: 0

## 2021-02-08 ASSESSMENT — PAIN DESCRIPTION - PROGRESSION: CLINICAL_PROGRESSION: NOT CHANGED

## 2021-02-08 NOTE — PROGRESS NOTES
[address] . [facility]  [facility] SKILLED NURSING UNIT  Occupational Therapy  Daily Note  Time:   Time In: 388  Time Out: 6024  Timed Code Treatment Minutes: 47 Minutes  Minutes: 54          Date: 2021  Patient Name: Erin Lindquist,   Gender: male      Room: [facility]/070-A  MRN: [id number]  : 1940  (79 y.o.)  Referring Practitioner: Gib Bumpers, MD Attending: Dr Shonda Lezama  Diagnosis: [place] 19  Additional Pertinent Hx: Pt presents to the Emergency Department for the evaluation of cough and shortness of breath. Patient reports gradual onset of symptoms over the past 5 days with significant worsening of the shortness of breath over the past 2 days. He reports unproductive cough associated with decreased sense of taste and smell and some nasal congestion over the past week. Denies any known sick contacts and has not been tested for Covid since symptom onset. He reports worsening of shortness of breath with exertion, improvement with rest.  Reports generalized weakness that occurs with activity. Pt admitted to TCU on 21 for further therapy. Restrictions/Precautions:  Restrictions/Precautions: General Precautions, Fall Risk  Position Activity Restriction  Other position/activity restrictions:  O2 on 3L and 4L with activity; 2/1 4L at rest/activity; 2/4 2L at rest/2-3L with activity--- 2/6 .5L at rest and 1L with activity; 2/8 .5L with activity, room air at rest     SUBJECTIVE: Patient seated in bedside chair upon arrival. Agreeable to OT session    PAIN: Denies    COGNITION: WFL    ADL:   Grooming: Stand By Assistance. Standing sinkside to complete oral care and wash hands after toileting tasks  Bathing: Stand By Assistance. Standing in shower to wash fabian area/bottom holding onto grab bar. Able to wash remaining areas seated on shower chair with supervision  Upper Extremity Dressing: with set-up.   To doff/don long sleeve shirt seated in chair with increased time d/t decreased ROM in B shoulders  Lower Extremity Dressing: Minimal Assistance. To doff/don socks. Able to thread BLE into pants/underwear seated with SBA. Pulled up over hips with BUE release from walker with SBA  Toileting: Stand By Assistance. For hygiene and clothing management  Toilet Transfer: Supervision. To/from STS using grab bar  Tub Transfer: [address] To step in/out of tub using grab bar. SBA to/from shower chair using grab bar. BALANCE:  Sitting Balance:  Supervision. Standing Balance: Stand By Assistance. BED MOBILITY:  Not Tested    TRANSFERS:  Sit to Stand:  Stand By Assistance. From shower chair; Supervision from other various surfaces  Stand to Sit: Stand By Assistance. To shower chair; Supervision to other various surfaces    FUNCTIONAL MOBILITY:  Assistive Device: Rolling Walker  Assist Level:  Stand By Assistance. Distance: To and from bathroom, To and from shower room and 1 lap around unit  Fair pace, no LOB noted. Seated rest break required after each trial of mobility. Completed functional mobility to/from bathroom and to/from shower room with 0.5L of O2- sats maintained 94-95%. Monitored O2 sats during 1 lap around unit with O2 sats dropping to 89%- able to recover within 10 seconds to 92%. Discussed plan of care with patient in regards to patient's progression towards therapy goals. Patient very eager to return home with goal being to wean off O2 completely. ASSESSMENT:   Assessment: Pt is making great progress towards goals. Factors facilitating goal achievement include: pleasant, cooperative, motivated, great family support. Barriers to goal achievement include: decreased activity tolerance, decreased ROM in B shoulders, and decreased safety awareness with management of O2 tubing however has improved from previous session. Family education has been addressed on the following topics: discussion provided on patient's progression with granddaughter Manuelito Dorman.  Pt continues to require skilled Occupational Therapy to address the following performance deficits balance, ADLs using LHAE including sock aid, safety awareness with O2 tubing, and activity tolerance. Without skilled OT intervention pt is at risk for functional decline, increased falls, and readmission to hospital.     Activity Tolerance:  Patient tolerance of  treatment: fair. Discharge Recommendations: Home with Home health OT   Equipment Recommendations: Equipment Needed: Yes  Other: Patient would benefit from shower chair, pulse ox  Plan: Times per week: 6x  Current Treatment Recommendations: Functional Mobility Training, Endurance Training, Self-Care / ADL, Patient/Caregiver Education & Training, Strengthening, Balance Training, Safety Education & Training, Equipment Evaluation, Education, & procurement, Home Management Training    Patient Education  Patient Education: safety with transfers and mobility, ADL strategies, management of O2 during ADL routine    Goals  Short term goals  Time Frame for Short term goals: 1 week  Short term goal 1: Pt will complete BUE ROM/light resistive exercises with min vcs for technique to increase indep and endurance with all self cares and IADLs. Short term goal 2: Pt will complete standing task x 3 minutes with 1-2 UE release and SBA while o2 stats remain avove 90% to increase indep and endurance with grooming and simple IADLs. Short term goal 3: Pt will complete functional mobility HH distances with SBA and whil O2 stats remain above 90% to increase stength and endurance with all self cares and grocery shopping. Short term goal 4: Pt will complete LB dressing with S, O2 stats remaining above 90% and 0 vcs for safety/taking rest breaks to increase indep in home environment.   Long term goals  Time Frame for Long term goals : 3 weeks  Long term goal 1: Pt will complete IADL task Mod Indep while demostrating EC techniques to increase endurance and safety with all cooking and laundry in home environment. Long term goal 2: Pt will complete ADL routine including shower (when out of droplet +) with Mod indep while demonstrating EC techniques to increase endurance with all self cares. Following session, patient left in safe position with all fall risk precautions in place.

## 2021-02-08 NOTE — PROGRESS NOTES
[facility]  INPATIENT PHYSICAL THERAPY  DAILY NOTE  - [place] SKILLED NURSING UNIT - 8E-70/070-A    Time In: 1400  Time Out: 1430  Timed Code Treatment Minutes: 30 Minutes  Minutes: 30          Date: 2021  Patient Name: Mgehana Bautista,  Gender:  male        MRN: [id number]  : 1940  (79 y.o.)     Referring Practitioner: Ordering: Marcial Garcia MD; Attending: Christina Angela MD     Additional Pertinent Hx: Per ED note, pt \"is a 79 y.o. male who presents to the Emergency Department for the evaluation of cough and shortness of breath. Patient reports gradual onset of symptoms over the past 5 days with significant worsening of the shortness of breath over the past 2 days. He reports unproductive cough associated with decreased sense of taste and smell and some nasal congestion over the past week. Denies any known sick contacts and has not been tested for Covid since symptom onset. He reports worsening of shortness of breath with exertion, improvement with rest.  Reports generalized weakness that occurs with activity. He has been taking Tylenol and was also started on vitamin C, vitamin D, zinc by his daughter earlier in the season. He denies any pulmonary history. He is not a smoker. He denies any associated fevers, chills, generalized body aches, headache, sore throat, vomiting, diarrhea, wheezing, edema, orthopnea or dizziness. \"     Prior Level of Function:  Lives With: Spouse  Type of Home: House  Home Layout: One level  Home Access: Stairs to enter with rails  Entrance Stairs - Number of Steps: 3-4 steps with 1 rail  Home Equipment: (none)   Bathroom Shower/Tub: Tub/Shower unit  Bathroom Toilet: Standard  Bathroom Equipment: Grab bars in shower  Bathroom Accessibility: Accessible    Receives Help From: Family  ADL Assistance: [address] [place] [address] Avenue: Independent  Homemaking Responsibilities: Yes  Ambulation Assistance: Independent  Transfer Assistance: Independent  Active : Yes  Additional Comments: Pt reports being Independent with all mobility in PLOF. Pt shared homemaking with his spouse prior to admission. Pt's spouse is also hospitalized on this same unit due to COVID-19 virus at this time. Restrictions/Precautions:  Restrictions/Precautions: General Precautions, Fall Risk  Position Activity Restriction  Other position/activity restrictions: 1/31 O2 on 3L and 4L with activity; 2/1 4L at rest/activity; 2/4 2L at rest/2-3L with activity--- 2/6 .5L at rest and 1L with activity; 2/8 .5L with activity, room air at rest     SUBJECTIVE: Patient in bedside chair upon arrival and agreeable to therapy. Provided education on breathing exercises and rest breaks following exertion. Patient on room air, O2 sats 94%. Trialed room air with activity, drops to 88% with quick recovery to >90% within seconds. PAIN: denies    OBJECTIVE:  Bed Mobility:  Not Tested    Transfers:  Sit to Stand: Stand By Assistance, improved sequencing and hand placement and locking breaks without cues  Stand to Sit:Stand By Assistance,  improved sequencing and hand placement and locking breaks without cues    Ambulation:  Stand By Assistance  Distance: ~250ft x2  Surface: Level Tile  Device:4 Wheeled Walker  Gait Deviations: Forward Flexed Posture, Slow Emilia, Decreased Step Length Bilaterally, Decreased Gait Speed, Decreased Heel Strike Bilaterally and Mild Path Deviations    Balance:  Dynamic gait: CGA            -weaving cones and 4WW maneuverability forward, backing up and turns.            -ambulating in therapy gym and using reacher to  cones. Exercise:  None this session. Functional Outcome Measures: Not completed       ASSESSMENT:  Assessment: Patient progressing toward established goals. Activity Tolerance:  Patient tolerance of  treatment: good. Equipment Recommendations: Other: monitor for needs - poss RW at discharge?   Discharge Recommendations: Continue to assess pending progress    Plan: Times per week: 5x BID, 1x QD  Current Treatment Recommendations: Strengthening, Gait Training, Patient/Caregiver Education & Training, Equipment Evaluation, Education, & procurement, Balance Training, Functional Mobility Training, Endurance Training, Transfer Training, Safety Education & Training, Home Exercise Program, Stair training    Patient Education  Patient Education: Plan of Care, Precautions/Restrictions, Equipment Education, Transfers, Reviewed Prior Education, Gait    Goals:  Patient goals : go home  Short term goals  Time Frame for Short term goals: 2 weeks  Short term goal 1: Pt to be SBA for supine <> sit with no bed rail to get in/out of bed with decreased difficulty. Short term goal 2: Pt to be Supervision for sit <> stand to get up to ambulate  Short term goal 3: Pt to ambulate > 60 ft with RW/4WW with Supervision for household distances  Short term goal 4: Pt to negotiate 4 steps with 1-2 rails with SBA for home access  Long term goals  Time Frame for Long term goals : 3 weeks  Long term goal 1: Pt to be Mod I for supine <> sit without rail to get in/out of bed  Long term goal 2: Pt to be Mod I for sit <> stand to get up to ambulate  Long term goal 3: Pt to ambulate > 150 ft with RW/4WW with Mod I for household and community distances  Long term goal 4: Pt to negotiate 4 steps with 1 rail with Supervision for home access    Following session, patient left in safe position with all fall risk precautions in place.

## 2021-02-08 NOTE — PROGRESS NOTES
[address] [address]  INPATIENT PHYSICAL THERAPY  DAILY NOTE  [address] 8E-70/070-A    Time In: 706  Time Out: 08  Timed Code Treatment Minutes: 61 Minutes  Minutes: 60          Date: 2021  Patient Name: Marlon Tobar,  Gender:  male        MRN: [id number]  : 1940  (79 y.o.)     Referring Practitioner: Ordering: Tor Lamb MD; Attending: Marikay Hodgkins, MD     Additional Pertinent Hx: Per ED note, pt \"is a 79 y.o. male who presents to the Emergency Department for the evaluation of cough and shortness of breath. Patient reports gradual onset of symptoms over the past 5 days with significant worsening of the shortness of breath over the past 2 days. He reports unproductive cough associated with decreased sense of taste and smell and some nasal congestion over the past week. Denies any known sick contacts and has not been tested for Covid since symptom onset. He reports worsening of shortness of breath with exertion, improvement with rest.  Reports generalized weakness that occurs with activity. He has been taking Tylenol and was also started on vitamin C, vitamin D, zinc by his daughter earlier in the season. He denies any pulmonary history. He is not a smoker. He denies any associated fevers, chills, generalized body aches, headache, sore throat, vomiting, diarrhea, wheezing, edema, orthopnea or dizziness. \"     Prior Level of Function:  Lives With: Spouse  Type of Home: House  Home Layout: One level  Home Access: Stairs to enter with rails  Entrance Stairs - Number of Steps: 3-4 steps with 1 rail  Home Equipment: (none)   Bathroom Shower/Tub: Tub/Shower unit  Bathroom Toilet: Standard  Bathroom Equipment: Grab bars in shower  Bathroom Accessibility: Accessible    Receives Help From: Family  ADL Assistance: [address]: Independent  Homemaking Responsibilities: Yes  Ambulation Assistance: Independent  Transfer Assistance: Independent  Active : Yes  Additional Comments: Pt reports being Independent with all mobility in PLOF. Pt shared homemaking with his spouse prior to admission. Pt's spouse is also hospitalized on this same unit due to COVID-19 virus at this time. Restrictions/Precautions:  Restrictions/Precautions: General Precautions, Fall Risk  Position Activity Restriction  Other position/activity restrictions: 1/31 O2 on 3L and 4L with activity; 2/1 4L at rest/activity; 2/4 2L at rest/2-3L with activity--- 2/6 .5L at rest and 1L with activity     SUBJECTIVE: Patient laying in bed upon arrival and agreeable to therapy. Patient requested to use restroom at beginning of session. Patient on . 5L O2 during session and maintained 88-92% with activity at the lowest. Patient at 94% on room air at end of session, left on room air to eat breakfast.     PAIN: denies    OBJECTIVE:  Bed Mobility:  Supine to Sit: Stand By Assistance, with head of bed raised, with increased time for completion  Scooting: Stand By Assistance    Transfers:  Sit to Stand: Stand By Assistance  Stand to Sit:Stand By Assistance    Ambulation:  Stand By Assistance  Distance: ~180ft, ~250ft  Surface: Level Tile  Device:4 Wheeled Walker  Gait Deviations:  Slow Emilia, Decreased Step Length Bilaterally, Decreased Gait Speed, Decreased Heel Strike Bilaterally, Narrow Base of Support and Mild Path Deviations    Stairs:  Contact Guard Assistance  Number of Steps: 4  Height: 6\" step with Bilateral Handrails   **completed 4x3 trials, forward and lateral leading R/L, non-reciprocal    Balance:  Dynamic gait: CGA            -stepping forward over hurdles leading R/L and side-stepping over hurdles, using single UE support, noted improved step height            - monster walk forward and side-stepping with orange tband around ankles, single UE support    Exercise:  Patient was guided in 1 set(s) 15 reps of exercise to both lower extremities.   Seated tyler distances  Long term goal 4: Pt to negotiate 4 steps with 1 rail with Supervision for home access    Following session, patient left in safe position with all fall risk precautions in place.

## 2021-02-08 NOTE — PROGRESS NOTES
[facility]  Transitional Care Unit  Team Conference Note    Patient Name: Fortunato Tavarez   MRN: [id number]    : 1940  (79 y.o.)  Gender: male   Referring Practitioner: Ordering: Rand Velazco MD; Attending: Rhoda Tripp MD       Team Conference Date: 2021   Admission Date:     7:95 PM  Re-Certification Date:     :  Tentative Discharge Plan and current psychosocial issues: Patient is an 79-year-old  male who has admitted to [address] [place] [address] following a stay on acute for COVID. Patient and his spouse both [facility]. Patient and his spouse were living together in a private residence prior to hospitalization. Patient was independent with self-care, managing medications, finances,completing light housekeeping task, laundry, cooking and driving. Patient reports he would like to return home once he is able to discharge from [address] [place] [address]. Patient reports that he has a strong support system in place with adult children and other family members. Patient reports he and his wife have 2 daughters and one son, all who have remained in the area. SW anticipates patient will benefit from ongoing skilled therapies and medical equipment. SW will continue to monitor progress and maintain contact with patient and patient's family regarding length of stay and recommendations. SW will continue to assist with ongoing discharge planning.     Nursing:     Weight:  Weight: has been stable     Wounds/Incisions/Ulcers:  No skin/wound issues identified  Bowel: continent  Bladder:continent     Pain: Patient's pain is currently controlled with tylenol    Education Provided:  Diabetic:  No  Peg Tube:No    [place] Care:  Education:NA  Removal Necessary:  NA  Supplies Needed: NA     Anticoagulant Use:  Patient on lovenox for anticoagulation, which is being managed by Primary Care Physician    Antibiotic Use:  Patient not on antibiotics    Psychotropic Medication Use: Patient not on psychotropic medications. Oxygen Use: No    Home Medications Reconciled: N/A    Physical Therapy:   Patient is making good gains towards goals set for him. Patient progressing mobility from CGA to SBA with a 4WW and gradually decreasing O2 needs to . 5L/room air. Patient continues to require rest breaks due to shortness of breath and weakness, however making gradual gains and recovering quickly. Patient would benefit from continued skilled PT services for improved safety with O2 line management if needed, to progress patient to independent PLOF and reduce risk for falls. Patient caregiver for spouse. PT Equipment Recommendations  Other: monitor for needs - poss RW at discharge? Occupational  Therapy:  Pt is making great progress towards goals. Factors facilitating goal achievement include: pleasant, cooperative, motivated, great family support. Barriers to goal achievement include: decreased activity tolerance, decreased ROM in B shoulders, and decreased safety awareness with management of O2 tubing however has improved from previous session. Family education has been addressed on the following topics: discussion provided on patient's progression with granddaughter Dee Braden. Pt continues to require skilled Occupational Therapy to address the following performance deficits balance, ADLs using LHAE including sock aid, safety awareness with O2 tubing, and activity tolerance. Without skilled OT intervention pt is at risk for functional decline, increased falls, and readmission to hospital.     Equipment: Patient would benefit from shower chair, pulse ox       Nutrition:    Weight: 161 lb 3.2 oz (73.1 kg) / Body mass index is 26.02 kg/m². Please see nutrition note for details.     Family Education: No family available for education  Fall Risk:  Falling star program initiated    Goal Achievement:   Patient Continues to progress toward goal achievement    Discharge Plan   Estimated Length of Stay: 2/11/21  Destination: home health  Services at Discharge: [address], Occupational Therapy, Nursing and aide 3x week  Equipment at Discharge: Needs: pulse ox, shower chair. four wheeled walker  Factors facilitating achievement of predicted outcomes: Family support, Motivated, Cooperative and Pleasant  Barriers to the achievement of predicted outcomes: Limited safety awareness and Decreased endurance    Readmission Risk              Risk of Unplanned Readmission:        12         High (20-31)     Moderate (10-19)     Low (0-9)    Team Members Present at Conference:  Physician: Dr. Yamil Meade MD  Nurse: Malorie Ramirez RN  :  ZITA Funez  Occupational Therapist:  BALWINDER Garcia  Physical Therapist:   Phong Moran, MILLIE  Speech Therapist: Speech Therapist: N/A. All team members have reviewed and updated as necessary and appropriate the established interdisciplinary plan of care within the medical record of Vikram Marcus. Pt's team conference attended (see above.)  Pt seen on rounds with LSW, to review the results with patient and family. Discussed length of stay as above. Physical Exam:  General:  Alert and oriented  Vitals:   Vitals:    02/09/21 0757   BP: (!) 143/67   Pulse: 75   Resp: 18   Temp: 98 °F (36.7 °C)   SpO2: 94%     Heart:  Regular rate and rhythm  Lungs:  Clear to auscultation  Abdomen:  soft with positive bowel sounds   Skin atrophic  Head normocephalic  Membranes moist  Neuro weak  Psy cooperative    Assessment:  Progressing in full rehabilitation program (see above)    Plan:  Continue Rehab            Continue current medications            Spent 37 minutes today in patient management and care    Vikram Marcus was evaluated today and a DME order was entered for a wheeled walker with seat because he requires this to successfully complete daily living tasks of toileting, personal cares, ambulating and meal preparation.   A wheeled walker with seat is necessary due to the patient's unsteady gait, upper body weakness, inability to  and ambulation device, ambulating only short distances by pushing a walker, and the need to sit for a short time before resuming ambulation. These tasks cannot be completed with a lesser ambulation device such as a cane, crutch, or standard walker. The need for this equipment was discussed with the patient and he understands and is in agreement.             Electronically signed by Sherri Pimentel MD on 2/9/2021 at 8:16 AM

## 2021-02-08 NOTE — PLAN OF CARE
Problem: Airway Clearance - Ineffective  Goal: Achieve or maintain patent airway  2/8/2021 1703 by Rossy Warren LPN  Outcome: Ongoing  Note: Patient continues to maintain a clear airway with no complications. 2/8/2021 1618 by Rossy Warren LPN  Outcome: Ongoing     Problem: Gas Exchange - Impaired  Goal: Absence of hypoxia  Outcome: Ongoing  Note: Patient continues to maintain an O2 of 90% or greater while on room air. Problem: Breathing Pattern - Ineffective  Goal: Ability to achieve and maintain a regular respiratory rate  Outcome: Ongoing  Note: Patient continues to maintain a respiratory rate within limits even while walking laps in the [place] with therapy and nursing staff. Problem: Body Temperature -  Risk of, Imbalanced  Goal: Ability to maintain a body temperature within defined limits  Outcome: Ongoing  Note: Patient has shown no s/s of temperature being outside defined limits. Patients temperature was 97.8     Problem: Risk for Fluid Volume Deficit  Goal: Maintain absence of muscle cramping  Outcome: Ongoing  Note: Patient has no had s/s of muscle cramping today. Patient has stated he has been at a 0 for pain level today. Problem: Loneliness or Risk for Loneliness  Goal: Demonstrate positive use of time alone when socialization is not possible  Outcome: Ongoing  Note: Patient has spoken on the phone several times today with family and friends. Problem: Fatigue  Goal: Verbalize increase energy and improved vitality  Outcome: Ongoing  Note: Patient has shown signs of increased energy by walking in the halls every 2 hours. Problem: Falls - Risk of:  Goal: Will remain free from falls  Description: Will remain free from falls  Outcome: Ongoing  Note: Patient has not had a fall today. He has been utilizing his call light and had staff assisting him with all walks.      Problem: Pain:  Goal: Pain level will decrease  Description: Pain level will decrease  Outcome: Ongoing  Note: Patient states his pain is a 0 out of 10. Problem: Bleeding:  Goal: Will show no signs and symptoms of excessive bleeding  Description: Will show no signs and symptoms of excessive bleeding  Outcome: Ongoing  Note: Patient shows no sign/symptoms of bleeding. Patient is on Lovenox. Problem: Mobility - Impaired:  Goal: Mobility will improve  Description: Mobility will improve  Outcome: Ongoing  Note: Patients mobility continues to improve daily. Patient continues to increase the number of times he walks with staff in the halls. Problem: Skin Integrity:  Goal: Skin integrity will stabilize  Description: Skin integrity will stabilize  Outcome: Ongoing  Note: Patient shows no signs of skin integrity changing today. Problem: Discharge Planning:  Goal: Patients continuum of care needs are met  Description: Patients continuum of care needs are met  Outcome: Ongoing  Note: Patient and staff continue to work together with his family towards him being discharged as soon as goals are met. Problem: Skin Integrity:  Goal: Absence of new skin breakdown  Description: Absence of new skin breakdown  Outcome: Ongoing  Note: Patient shows no signs of any new skin breakdown issues today. Skin is intact, dry, and warm. Problem: Nutrition  Goal: Optimal nutrition therapy  Outcome: Ongoing  Note: Patient continues to improve his appetite. He has ate 100% of all his meals today.

## 2021-02-08 NOTE — CARE COORDINATION
Erick Esteban was evaluated today and a DME order was entered for a wheeled walker with seat because he requires this to successfully complete daily living tasks of toileting, personal cares, ambulating and meal preparation. A wheeled walker with seat is necessary due to the patient's unsteady gait, upper body weakness, inability to  and ambulation device, ambulating only short distances by pushing a walker, and the need to sit for a short time before resuming ambulation. These tasks cannot be completed with a lesser ambulation device such as a cane, crutch, or standard walker. The need for this equipment was discussed with the patient and he understands and is in agreement.   Electronically signed by Olena Guillermo MD on 2/11/2021 at 10:46 AM

## 2021-02-09 LAB
ANION GAP SERPL CALCULATED.3IONS-SCNC: 7 MEQ/L (ref 8–16)
BUN BLDV-MCNC: 14 MG/DL (ref 7–22)
CALCIUM SERPL-MCNC: 8.4 MG/DL (ref 8.5–10.5)
CHLORIDE BLD-SCNC: 101 MEQ/L (ref 98–111)
CO2: 26 MEQ/L (ref 23–33)
CREAT SERPL-MCNC: 0.7 MG/DL (ref 0.4–1.2)
GFR SERPL CREATININE-BSD FRML MDRD: > 90 ML/MIN/1.73M2
GLUCOSE BLD-MCNC: 82 MG/DL (ref 70–108)
POTASSIUM SERPL-SCNC: 4.4 MEQ/L (ref 3.5–5.2)
SODIUM BLD-SCNC: 134 MEQ/L (ref 135–145)

## 2021-02-09 PROCEDURE — 1290000000 HC SEMI PRIVATE OTHER R&B

## 2021-02-09 PROCEDURE — 6370000000 HC RX 637 (ALT 250 FOR IP): Performed by: INTERNAL MEDICINE

## 2021-02-09 PROCEDURE — 6360000002 HC RX W HCPCS: Performed by: FAMILY MEDICINE

## 2021-02-09 PROCEDURE — 36415 COLL VENOUS BLD VENIPUNCTURE: CPT

## 2021-02-09 PROCEDURE — 97116 GAIT TRAINING THERAPY: CPT

## 2021-02-09 PROCEDURE — 97535 SELF CARE MNGMENT TRAINING: CPT

## 2021-02-09 PROCEDURE — 80048 BASIC METABOLIC PNL TOTAL CA: CPT

## 2021-02-09 PROCEDURE — 97530 THERAPEUTIC ACTIVITIES: CPT

## 2021-02-09 PROCEDURE — 97110 THERAPEUTIC EXERCISES: CPT

## 2021-02-09 RX ADMIN — ENOXAPARIN SODIUM 30 MG: 30 INJECTION SUBCUTANEOUS at 08:00

## 2021-02-09 RX ADMIN — ENOXAPARIN SODIUM 30 MG: 30 INJECTION SUBCUTANEOUS at 19:47

## 2021-02-09 RX ADMIN — ASPIRIN 81 MG: 81 TABLET, CHEWABLE ORAL at 08:00

## 2021-02-09 RX ADMIN — PANTOPRAZOLE SODIUM 40 MG: 40 TABLET, DELAYED RELEASE ORAL at 06:31

## 2021-02-09 RX ADMIN — AMLODIPINE BESYLATE 5 MG: 5 TABLET ORAL at 08:00

## 2021-02-09 RX ADMIN — OXYCODONE HYDROCHLORIDE AND ACETAMINOPHEN 500 MG: 500 TABLET ORAL at 08:00

## 2021-02-09 RX ADMIN — Medication 1000 UNITS: at 08:00

## 2021-02-09 RX ADMIN — ACETAMINOPHEN 650 MG: 325 TABLET ORAL at 19:47

## 2021-02-09 RX ADMIN — Medication 50 MG: at 08:00

## 2021-02-09 RX ADMIN — LOSARTAN POTASSIUM 100 MG: 100 TABLET, FILM COATED ORAL at 08:00

## 2021-02-09 ASSESSMENT — PAIN DESCRIPTION - ONSET: ONSET: ON-GOING

## 2021-02-09 ASSESSMENT — PAIN DESCRIPTION - PROGRESSION: CLINICAL_PROGRESSION: NOT CHANGED

## 2021-02-09 ASSESSMENT — PAIN DESCRIPTION - PAIN TYPE: TYPE: ACUTE PAIN

## 2021-02-09 ASSESSMENT — PAIN SCALES - GENERAL
PAINLEVEL_OUTOF10: 0
PAINLEVEL_OUTOF10: 1

## 2021-02-09 NOTE — PROGRESS NOTES
[address] [place] [address]  Recreational Therapy  Daily Note  - [facility]    Time Spent with Patient: 15 minutes    Date:  2/9/2021       Patient Name: Meghana Bautista      MRN: [id number]      YOB: 1940 [place] [address] y.o.)       Gender: male  Diagnosis: COVID 23  Referring Practitioner: Lopez Crouch MD Attending: Dr Rojelio Guerrero    RESTRICTIONS/PRECAUTIONS:  Restrictions/Precautions: General Precautions, Fall Risk  Vision: Impaired  Hearing: Within functional limits    PAIN: 0-no c/o pain     SUBJECTIVE:  I am going home thursday    OBJECTIVE:  Pt reading the paper upon arrival-states he is content in his room with his newspaper and the tv-states a lot of times the tv is on for some noise-looking forward to going home on Thursday and states his wife is doing well at home-bright and pleasant         Patient Education  New Education Provided: Importance of Leisure,     Electronically signed by: LATA Cazares  Date: 2/9/2021

## 2021-02-09 NOTE — PLAN OF CARE
Problem: Gas Exchange - Impaired  Goal: Absence of hypoxia  2/8/2021 2346 by Yimi Traylor RN  Outcome: Ongoing   Patient 02 sat mid 90's on room air.  Will continue to monitor through the night

## 2021-02-09 NOTE — PLAN OF CARE
Problem: Gas Exchange - Impaired  Goal: Promote optimal lung function  Outcome: Ongoing  Note: Patient has used acapella and incentive spirometer several times throughout the shift. Oxygen was discontinued and he remains above 90% o2 on room air. Problem: Falls - Risk of:  Goal: Will remain free from falls  Description: Will remain free from falls  Outcome: Ongoing  Note: Patient is on falling star program. When ambulating he wears non skid socks or shoes and a gait belt. He also uses a walker. Patient uses call light appropriately when needed assistance from staff members. Bed and chair alarms are in use. Call light within reach. Problem: Pain:  Goal: Pain level will decrease  Description: Pain level will decrease  Outcome: Ongoing  Note: Patient complains of small discomfort in his side but  denies needed and medication to relieve the pain. Educated on non medication comfort measures. Will continue to monitor.

## 2021-02-09 NOTE — PROGRESS NOTES
[address] [place] [address]  INPATIENT PHYSICAL THERAPY  DAILY NOTE  [address] [id number]  Time In: 3675  Time Out: 1219  Timed Code Treatment Minutes: 29 Minutes  Minutes: 34          Date: 2021  Patient Name: Beatriz Renteria,  Gender:  male        MRN: [id number]  : 1940  (79 y.o.)     Referring Practitioner: Ordering: Susan Moyer MD; Attending: Shekhar Calixto MD     Additional Pertinent Hx: Per ED note, pt \"is a 79 y.o. male who presents to the Emergency Department for the evaluation of cough and shortness of breath. Patient reports gradual onset of symptoms over the past 5 days with significant worsening of the shortness of breath over the past 2 days. He reports unproductive cough associated with decreased sense of taste and smell and some nasal congestion over the past week. Denies any known sick contacts and has not been tested for Covid since symptom onset. He reports worsening of shortness of breath with exertion, improvement with rest.  Reports generalized weakness that occurs with activity. He has been taking Tylenol and was also started on vitamin C, vitamin D, zinc by his daughter earlier in the season. He denies any pulmonary history. He is not a smoker. He denies any associated fevers, chills, generalized body aches, headache, sore throat, vomiting, diarrhea, wheezing, edema, orthopnea or dizziness. \"     Prior Level of Function:  Lives With: Spouse  Type of Home: House  Home Layout: One level  Home Access: Stairs to enter with rails  Entrance Stairs - Number of Steps: 3-4 steps with 1 rail  Home Equipment: (none)   Bathroom Shower/Tub: Tub/Shower unit  Bathroom Toilet: Standard  Bathroom Equipment: Grab bars in shower  Bathroom Accessibility: Accessible    Receives Help From: Family  ADL Assistance: [facility] [address]: Independent  Homemaking Responsibilities: Yes  Ambulation Assistance: Independent  Transfer Assistance: Independent  Active : Yes  Additional Comments: Pt reports being Independent with all mobility in PLOF. Pt shared homemaking with his spouse prior to admission. Pt's spouse is also hospitalized on this same unit due to COVID-19 virus at this time. Restrictions/Precautions:  Restrictions/Precautions: General Precautions, Fall Risk  Position Activity Restriction  Other position/activity restrictions: 1/31 O2 on 3L and 4L with activity; 2/1 4L at rest/activity; 2/4 2L at rest/2-3L with activity--- 2/6 .5L at rest and 1L with activity; 2/8 .5L with activity, room air at rest; 2/9 room air     SUBJECTIVE: Patient in room in chair, agreeable to PT. Pt excited for return home 2/11. Patient's O2 90-93% with activity on room air. PAIN: no complaints of pain    OBJECTIVE:  Bed Mobility:  Not Tested    Transfers:  Sit to Stand: Supervision, Stand By Assistance  Stand to Sit:Stand By Assistance   Car Transfer: Stand By Valma Tammy cues for hand placement, completed 2x, good carryover second trial  Verbal cues for hand placement ~25% of the time    Ambulation:  Stand By Assistance, supervision  Distance: 150', 45', 75'x2, 39', 150'  Surface: Level Tile  Device:4 Wheeled Walker  Gait Deviations:   Forward Flexed Posture, Decreased Step Length Bilaterally, Decreased Gait Speed and Decreased Heel Strike Bilaterally  Verbal cues for increased stride length with fatigue, otherwise good carryover from earlier session this date  -Education on keeping his safety in mind at home when attempting to assist spouse    Stairs:  Contact Guard Assistance  Number of Steps: 4  Height: 6\" step with One Handrail with bilateral hands on one hand rail  Pt completing with sidestep technique, improved quality with descent    Balance:  Static Sitting Balance:  Independent  Static Standing Balance: Modified Independent, Supervision  Dynamic Standing Balance: Supervision, Stand By Assistance, Contact Guard Assistance    Functional Outcome Measures: Not completed       ASSESSMENT:  Assessment: Patient progressing toward established goals. Activity Tolerance:  Patient tolerance of  treatment: good. Equipment Recommendations: Other: monitor for needs - poss RW at discharge? Discharge Recommendations:  Continue to assess pending progress    Plan: Times per week: 5x BID, 1x QD  Current Treatment Recommendations: Strengthening, Gait Training, Patient/Caregiver Education & Training, Equipment Evaluation, Education, & procurement, Balance Training, Functional Mobility Training, Endurance Training, Transfer Training, Safety Education & Training, Home Exercise Program, Stair training    Patient Education  Patient Education: Transfers, Gait, Stairs,  - Patient Verbalized Understanding, - Patient Requires Continued Education    Goals:  Patient goals : go home  Short term goals  Time Frame for Short term goals: 2 weeks  Short term goal 1: Pt to be SBA for supine <> sit with no bed rail to get in/out of bed with decreased difficulty. Short term goal 2: Pt to be Supervision for sit <> stand to get up to ambulate  Short term goal 3: Pt to ambulate > 60 ft with RW/4WW with Supervision for household distances  Short term goal 4: Pt to negotiate 4 steps with 1-2 rails with SBA for home access  Long term goals  Time Frame for Long term goals : 3 weeks  Long term goal 1: Pt to be Mod I for supine <> sit without rail to get in/out of bed  Long term goal 2: Pt to be Mod I for sit <> stand to get up to ambulate  Long term goal 3: Pt to ambulate > 150 ft with RW/4WW with Mod I for household and community distances  Long term goal 4: Pt to negotiate 4 steps with 1 rail with Supervision for home access    Following session, patient left in safe position with all fall risk precautions in place.

## 2021-02-09 NOTE — PROGRESS NOTES
[address] [place] [address]  INPATIENT PHYSICAL THERAPY  DAILY NOTE  [facility] SKILLED NURSING UNIT - 8E-70/070-A  Time In: 701  Time Out: 347  Timed Code Treatment Minutes: 64 Minutes  Minutes: 56          Date: 2021  Patient Name: Shashank Booth,  Gender:  male        MRN: [id number]  : 1940  (79 y.o.)     Referring Practitioner: Ordering: Daniel Dacosta MD; Attending: Praneeth Vasquez MD     Additional Pertinent Hx: Per ED note, pt \"is a 79 y.o. male who presents to the Emergency Department for the evaluation of cough and shortness of breath. Patient reports gradual onset of symptoms over the past 5 days with significant worsening of the shortness of breath over the past 2 days. He reports unproductive cough associated with decreased sense of taste and smell and some nasal congestion over the past week. Denies any known sick contacts and has not been tested for Covid since symptom onset. He reports worsening of shortness of breath with exertion, improvement with rest.  Reports generalized weakness that occurs with activity. He has been taking Tylenol and was also started on vitamin C, vitamin D, zinc by his daughter earlier in the season. He denies any pulmonary history. He is not a smoker. He denies any associated fevers, chills, generalized body aches, headache, sore throat, vomiting, diarrhea, wheezing, edema, orthopnea or dizziness. \"     Prior Level of Function:  Lives With: Spouse  Type of Home: House  Home Layout: One level  Home Access: Stairs to enter with rails  Entrance Stairs - Number of Steps: 3-4 steps with 1 rail  Home Equipment: (none)   Bathroom Shower/Tub: Tub/Shower unit  Bathroom Toilet: Standard  Bathroom Equipment: Grab bars in shower  Bathroom Accessibility: Accessible    Receives Help From: Family  ADL Assistance: [address] [place] [address] Avenue: Independent  Homemaking Responsibilities: Yes  Ambulation Assistance: Independent  Transfer Assistance: Independent  Active : Yes  Additional Comments: Pt reports being Independent with all mobility in PLOF. Pt shared homemaking with his spouse prior to admission. Pt's spouse is also hospitalized on this same unit due to COVID-19 virus at this time. Restrictions/Precautions:  Restrictions/Precautions: General Precautions, Fall Risk  Position Activity Restriction  Other position/activity restrictions: 1/31 O2 on 3L and 4L with activity; 2/1 4L at rest/activity; 2/4 2L at rest/2-3L with activity--- 2/6 .5L at rest and 1L with activity; 2/8 .5L with activity, room air at rest; 2/9 room air     SUBJECTIVE: Patient in room in chair, agreeable to PT. Pt's O2 95% on room air. PAIN: left hip, mild, not rated    OBJECTIVE:  Bed Mobility:  Rolling to Right: Supervision   Supine to Sit: Stand By Assistance  Sit to Supine: Stand By Assistance    Completed once with bed rail and once without, increased difficulty without bed rail, but able to complete. Pt notes attempting on his own with staff to increase independence. Transfers:  Sit to Stand: Stand By Assistance  Stand to Sit:Stand By Assistance   Verbal cues for hand placement ~25% of the time  -Pt completing transfers chair to chair this date to improve safety  -Education on utilizing seat on 4WW only as needed, encouraged use of standard chair as available. Education on placing 4WW against wall if able before sitting down to ensure 4WW does not move. Ambulation:  Stand By Assistance  Distance: 90', 20', 70', 30'x2 short distances in therapy gym  Surface: Level Tile  Device:4 Wheeled Walker  Gait Deviations: Forward Flexed Posture, Slow Emilia, Decreased Step Length Bilaterally, Decreased Gait Speed and Decreased Heel Strike Bilaterally  -Verbal cues for increased stride length. Emphasis on improved gait pattern this date, good carryover throughout. Pt notes plans to get assessed for shoe lift prior to getting covid.     Stairs:  Contact Guard Assistance  Number of Steps: 4 x2  Height: 6\" step with One Handrail-bilateral hands on one hand rail  Verbal cues for LE sequencing, ascending second time with right leg first and improved technique noted    Balance:  Static Standing Balance: Supervision  Dynamic Standing Balance: Stand By Assistance, Contact Guard Assistance    Exercise:  Standing: side step ups x 10 each LE, forward step ups x 5 each LE. Standing: hip abduction while standing on 6\" step. Verbal cues for foot positioning with side step/hip abduction with tendency to demonstrate external rotation especially on left. Exercises were completed for increased independence with functional mobility. Functional Outcome Measures: Completed   Timed up and Go Test (TUG)  21 seconds with 4WW       Normative Reference Values  60-69 years:  8.1 seconds  70-79 years: 9.2 seconds  80-99 years: 11.3 seconds    < 10 seconds is normal, a score of > 14 seconds indicates high fall risk         ASSESSMENT:  Assessment: Patient progressing toward established goals. Activity Tolerance:  Patient tolerance of  treatment: good. O2 90-93% throughout most of session, 88-89% with more challenging activities such as exercises completed this date, however quick recovery, <1 minute to 92% and greater. Good demonstration of pursed lip breathing. Education on resting when needed at home to avoid decrease in O2. Pt has pulse ox for home. Equipment Recommendations: Other: 4WW-ordered 2/8.   Discharge Recommendations:  Home with home health PT    Plan: Times per week: 5x BID, 1x QD  Current Treatment Recommendations: Strengthening, Gait Training, Patient/Caregiver Education & Training, Equipment Evaluation, Education, & procurement, Balance Training, Functional Mobility Training, Endurance Training, Transfer Training, Safety Education & Training, Home Exercise Program, Stair training    Patient Education  Patient Education: Transfers, Gait, Stairs, Verbal Exercise Instruction,  - Patient Verbalized Understanding, - Patient Requires Continued Education    Goals:  Patient goals : go home  Short term goals  Time Frame for Short term goals: 2 weeks  Short term goal 1: Pt to be SBA for supine <> sit with no bed rail to get in/out of bed with decreased difficulty. Short term goal 2: Pt to be Supervision for sit <> stand to get up to ambulate  Short term goal 3: Pt to ambulate > 60 ft with RW/4WW with Supervision for household distances  Short term goal 4: Pt to negotiate 4 steps with 1-2 rails with SBA for home access  Long term goals  Time Frame for Long term goals : 3 weeks  Long term goal 1: Pt to be Mod I for supine <> sit without rail to get in/out of bed  Long term goal 2: Pt to be Mod I for sit <> stand to get up to ambulate  Long term goal 3: Pt to ambulate > 150 ft with RW/4WW with Mod I for household and community distances  Long term goal 4: Pt to negotiate 4 steps with 1 rail with Supervision for home access    Following session, patient left in safe position with all fall risk precautions in place.

## 2021-02-09 NOTE — PROGRESS NOTES
[facility]  [address] [place] SKILLED NURSING UNIT  Occupational Therapy  Daily Note  Time:   Time In: 848  Time Out: 5777  Timed Code Treatment Minutes: 47 Minutes  Minutes: 54          Date: 2021  Patient Name: Fatemeh Cedillo,   Gender: male      Room: [facility]/070-  MRN: [id number]  : 1940  (79 y.o.)  Referring Practitioner: Alena Martinez MD Attending: Dr Paulo Phillip  Diagnosis: COVID 19  Additional Pertinent Hx: Pt presents to the Emergency Department for the evaluation of cough and shortness of breath. Patient reports gradual onset of symptoms over the past 5 days with significant worsening of the shortness of breath over the past 2 days. He reports unproductive cough associated with decreased sense of taste and smell and some nasal congestion over the past week. Denies any known sick contacts and has not been tested for Covid since symptom onset. He reports worsening of shortness of breath with exertion, improvement with rest.  Reports generalized weakness that occurs with activity. Pt admitted to TCU on 21 for further therapy. Restrictions/Precautions:  Restrictions/Precautions: General Precautions, Fall Risk  Position Activity Restriction  Other position/activity restrictions:  O2 on 3L and 4L with activity; 2/1 4L at rest/activity; 2/4 2L at rest/2-3L with activity--- 2/6 .5L at rest and 1L with activity; 2/8 .5L with activity, room air at rest; 2/9 room air     SUBJECTIVE: Patient seated in bedside chair upon arrival. Agreeable to OT session    PAIN: Denies    COGNITION: WFL    ADL:   Lower Extremity Dressing: Modified Independent. To doff/don socks seated in chair x2 trials without use of AE. BALANCE:  Sitting Balance:  Modified Independent. Standing Balance: Supervision.     *Patient identified one of their personal goals is to be able to sustain functional standing positions in order to complete various ADL and IADL skills in standing position, such as sinkside grooming or washing dishes. Dynamic standing task was then facilitated to challenge balance and activity tolerance. Patient required supervision, and [facility]'ed an endurance of 5 minutes. BED MOBILITY:  Not Tested    TRANSFERS:  Sit to Stand:  Supervision. From various surfaces  Stand to Sit: Supervision. To various surfaces    FUNCTIONAL MOBILITY:  Assistive Device: 4 Wheeled Walker  Assist Level:  Supervision. Distance: To and from therapy gym and throughout therapy kitchen and 2 laps around unit  Seated rest break required after each trial of mobility. After walking for approx 3 minutes 30 seconds O2 sats decreased to 89% however recovered within a few seconds to 92%      ADDITIONAL ACTIVITIES:  Patient identified a personal goal to increase UB strength and improve overall endurance so they can complete their toilet & shower transfers; skilled edu on UE strengthening and patient completed BUE strengthening exercises x20 reps x1 set this date with a 1# dumb bell in all distal joints and all planes. Completed BUE table top exercises with shoulder flexion and shoulder horizontal abduction x20 reps x1 set. Patient tolerated fair, requiring rest breaks. Pt required cues for technique. Patient completed IADL homemaking task this date of making cake in a mug - task was graded to challenge balance, safety, and activity tolerance. Patient was able to retrieve all items from graded heights with supervision and no VCs for safety during the retrieval and transportation of items. Patient required 1 seated rest break throughout task and 1 seated rest break after task with a standing endurance of 4 minutes 30 seconds x1 trial and 2 minutes x1 trial. Required seated rest break after task. Checked O2 after task with readings fluctuating between 92-94%. ASSESSMENT:     Activity Tolerance:  Patient tolerance of  treatment: fair.        Discharge Recommendations: Home with [address] [place] [address] Recommendations: Equipment Needed: Yes  Other: [address] daughter purchased shower chair and pulse ox. Does not need LHAE. Plan: Times per week: 6x  Current Treatment Recommendations: Functional Mobility Training, Endurance Training, Self-Care / ADL, Patient/Caregiver Education & Training, Strengthening, Balance Training, Safety Education & Training, Equipment Evaluation, Education, & procurement, Home Management Training    Patient Education  Patient Education: ADL's, IADL's and Home Exercise Program, equipment needs     Goals  Short term goals  Time Frame for Short term goals: 1 week  Short term goal 1: Pt will complete BUE ROM/light resistive exercises with min vcs for technique to increase indep and endurance with all self cares and IADLs. Short term goal 2: Pt will complete standing task x 3 minutes with 1-2 UE release and SBA while o2 stats remain avove 90% to increase indep and endurance with grooming and simple IADLs. Short term goal 3: Pt will complete functional mobility HH distances with SBA and whil O2 stats remain above 90% to increase stength and endurance with all self cares and grocery shopping. Short term goal 4: Pt will complete LB dressing with S, O2 stats remaining above 90% and 0 vcs for safety/taking rest breaks to increase indep in home environment. Long term goals  Time Frame for Long term goals : 3 weeks  Long term goal 1: Pt will complete IADL task Mod Indep while demostrating EC techniques to increase endurance and safety with all cooking and laundry in home environment. Long term goal 2: Pt will complete ADL routine including shower (when out of droplet +) with Mod indep while demonstrating EC techniques to increase endurance with all self cares. Following session, patient left in safe position with all fall risk precautions in place.

## 2021-02-10 PROCEDURE — 6370000000 HC RX 637 (ALT 250 FOR IP): Performed by: INTERNAL MEDICINE

## 2021-02-10 PROCEDURE — 6360000002 HC RX W HCPCS: Performed by: FAMILY MEDICINE

## 2021-02-10 PROCEDURE — 97116 GAIT TRAINING THERAPY: CPT

## 2021-02-10 PROCEDURE — 97530 THERAPEUTIC ACTIVITIES: CPT

## 2021-02-10 PROCEDURE — 1290000000 HC SEMI PRIVATE OTHER R&B

## 2021-02-10 PROCEDURE — 97535 SELF CARE MNGMENT TRAINING: CPT

## 2021-02-10 PROCEDURE — 97110 THERAPEUTIC EXERCISES: CPT

## 2021-02-10 RX ADMIN — OXYCODONE HYDROCHLORIDE AND ACETAMINOPHEN 500 MG: 500 TABLET ORAL at 08:43

## 2021-02-10 RX ADMIN — AMLODIPINE BESYLATE 5 MG: 5 TABLET ORAL at 08:43

## 2021-02-10 RX ADMIN — ENOXAPARIN SODIUM 30 MG: 30 INJECTION SUBCUTANEOUS at 19:45

## 2021-02-10 RX ADMIN — Medication 50 MG: at 08:43

## 2021-02-10 RX ADMIN — ENOXAPARIN SODIUM 30 MG: 30 INJECTION SUBCUTANEOUS at 08:43

## 2021-02-10 RX ADMIN — ASPIRIN 81 MG: 81 TABLET, CHEWABLE ORAL at 08:43

## 2021-02-10 RX ADMIN — PANTOPRAZOLE SODIUM 40 MG: 40 TABLET, DELAYED RELEASE ORAL at 05:34

## 2021-02-10 RX ADMIN — ACETAMINOPHEN 650 MG: 325 TABLET ORAL at 21:28

## 2021-02-10 RX ADMIN — Medication 1000 UNITS: at 08:43

## 2021-02-10 RX ADMIN — LOSARTAN POTASSIUM 100 MG: 100 TABLET, FILM COATED ORAL at 08:43

## 2021-02-10 ASSESSMENT — PAIN SCALES - GENERAL
PAINLEVEL_OUTOF10: 1
PAINLEVEL_OUTOF10: 0

## 2021-02-10 NOTE — PLAN OF CARE
Problem: Gas Exchange - Impaired  Goal: Absence of hypoxia  2/10/2021 1631 by Sophia Wayne RN  Outcome: Ongoing  Note: Patient remains on room air      Problem: Breathing Pattern - Ineffective  Goal: Ability to achieve and maintain a regular respiratory rate  2/10/2021 1631 by Sophia Wayne RN  Outcome: Ongoing  Note: No SOB noted with activity or at rest     Problem: Body Temperature -  Risk of, Imbalanced  Goal: Ability to maintain a body temperature within defined limits  2/10/2021 1631 by Sophia Wayne RN  Outcome: Ongoing  Note: VS stable /69   Pulse 89   Temp 97.4 °F (36.3 °C) (Oral)   Resp 22   Ht 5' 6\" (1.676 m)   Wt 161 lb 3.2 oz (73.1 kg)   SpO2 95%   BMI 26.02 kg/m²       Problem: Loneliness or Risk for Loneliness  Goal: Demonstrate positive use of time alone when socialization is not possible  2/10/2021 1631 by Sophia Wayne RN  Outcome: Ongoing  Note: Patient uses personal cell phone to communicate with family and friends. Problem: Falls - Risk of:  Goal: Will remain free from falls  Description: Will remain free from falls  2/10/2021 1631 by Sophia Wayne RN  Outcome: Ongoing  Note: No falls sustained at this time. Patient alert to call light and uses appropriately to alert staff to needs. Bed/chair alarms in use. Problem: Pain:  Goal: Pain level will decrease  Description: Pain level will decrease  2/10/2021 1631 by Sophia Wayne RN  Outcome: Ongoing  Note: No falls sustained at this time. Patient alert to call light and uses appropriately to alert staff to needs. Bed/chair alarms in use. Problem: Bleeding:  Goal: Will show no signs and symptoms of excessive bleeding  Description: Will show no signs and symptoms of excessive bleeding  2/10/2021 1631 by Sophia Wayne RN  Outcome: Ongoing  Note: No s/s excessive bleeding noted.  On lovenox      Problem: Mobility - Impaired:  Goal: Mobility will improve  Description: Mobility will improve  2/10/2021 1631 by Lester Carpenter RN  Outcome: Ongoing  Note: 1 assist supervision with ambulation. Independent bed mobility and toilet hygiene      Problem: Skin Integrity:  Goal: Skin integrity will stabilize  Description: Skin integrity will stabilize  2/10/2021 1631 by Lester Carpenter RN  Outcome: Ongoing  Note: No new skin issues noted this shift. Problem: Discharge Planning:  Goal: Patients continuum of care needs are met  Description: Patients continuum of care needs are met  2/10/2021 1631 by Lester Carpenter RN  Outcome: Ongoing  Note: Patient to discharge tomorrow to home.  Daughter to transport around [phone]

## 2021-02-10 NOTE — PROGRESS NOTES
[facility]  INPATIENT PHYSICAL THERAPY  DISCHARGE NOTE  - [place] SKILLED NURSING UNIT - 8E-70/070-A     Time In: 1302  Time Out: 1340  Timed Code Treatment Minutes: 45 Minutes  Minutes: 38          Date: 2/10/2021  Patient Name: Gayla Medina,  Gender:  male        MRN: [id number]  : 1940  (79 y.o.)     Referring Practitioner: Ordering: Benjamín Gonzalez MD; Attending: Shea Berrios MD     Additional Pertinent Hx: Per ED note, pt \"is a 79 y.o. male who presents to the Emergency Department for the evaluation of cough and shortness of breath. Patient reports gradual onset of symptoms over the past 5 days with significant worsening of the shortness of breath over the past 2 days. He reports unproductive cough associated with decreased sense of taste and smell and some nasal congestion over the past week. Denies any known sick contacts and has not been tested for Covid since symptom onset. He reports worsening of shortness of breath with exertion, improvement with rest.  Reports generalized weakness that occurs with activity. He has been taking Tylenol and was also started on vitamin C, vitamin D, zinc by his daughter earlier in the season. He denies any pulmonary history. He is not a smoker. He denies any associated fevers, chills, generalized body aches, headache, sore throat, vomiting, diarrhea, wheezing, edema, orthopnea or dizziness. \"     Prior Level of Function:  Lives With: Spouse  Type of Home: House  Home Layout: One level  Home Access: Stairs to enter with rails  Entrance Stairs - Number of Steps: 3-4 steps with 1 rail  Home Equipment: (none)   Bathroom Shower/Tub: Tub/Shower unit  Bathroom Toilet: Standard  Bathroom Equipment: Grab bars in shower  Bathroom Accessibility: Accessible    Receives Help From: Family  ADL Assistance: [address]: Independent  Homemaking Responsibilities: Yes  Ambulation Assistance: Independent  Transfer Assistance: Independent  Active : Yes  Additional Comments: Pt reports being Independent with all mobility in PLOF. Pt shared homemaking with his spouse prior to admission. Pt's spouse is also hospitalized on this same unit due to COVID-19 virus at this time. Restrictions/Precautions:  Restrictions/Precautions: General Precautions, Fall Risk  Position Activity Restriction  Other position/activity restrictions: 1/31 O2 on 3L and 4L with activity; 2/1 4L at rest/activity; 2/4 2L at rest/2-3L with activity--- 2/6 .5L at rest and 1L with activity; 2/8 .5L with activity, room air at rest; 2/9 room air      SUBJECTIVE: Pt sitting up in room chair and is excited to be going home tomorrow. Pt agreeable to therapy. PAIN: not reported      OBJECTIVE:  Bed Mobility:  Supine to Sit: Modified Independent  Sit to Supine: Modified Independent     Transfers:  Sit to Stand: Modified Independent  Stand to Sit:Modified Independent    Ambulation:  Modified Independent, Supervision  Distance: 180 ft and 120 ft  Surface: Level Tile  Device:4 Wheeled Walker  Gait Deviations:  Decreased Step Length Bilaterally and Decreased Gait Speed    Balance:  grossly steady with RW    Exercise:  Patient was guided in 1 set(s) 12 reps of exercise to both lower extremities. Hip abduction/adduction, Seated marches, Seated hamstring curls, Seated heel/toe raises and Long arc quads. Exercises were completed for increased independence with functional mobility. Functional Outcome Measures: Other      Timed up and Go Test (TUG)  16 seconds       Normative Reference Values  60-69 years:  8.1 seconds  70-79 years: 9.2 seconds  80-99 years: 11.3 seconds    < 10 seconds is normal, a score of > 14 seconds indicates high fall risk      ASSESSMENT:  Assessment: Patient progressing toward established goals. and meeting all short term and all but 1 long term goal. Pt also had a great improvement on TUG test from 32 sec at eval down to 16 sec today.  Pt has progressed with endurance as well as with safety for mobility during admission. Pt would benefit from continued Home Health PT to further address endurance building and functional mobility within the home. Activity Tolerance:  Patient tolerance of  treatment: good. Equipment Recommendations: Other: monitor for needs - 4WW ordered for discharge  Discharge Recommendations:   Home with Home health PT    Plan: Times per week: 5x BID, 1x QD  Current Treatment Recommendations: Strengthening, Gait Training, Patient/Caregiver Education & Training, Equipment Evaluation, Education, & procurement, Balance Training, Functional Mobility Training, Endurance Training, Transfer Training, Safety Education & Training, Home Exercise Program, Stair training    Patient Education  Patient Education: Plan of Care, Home Exercise Program    Goals:  Patient goals : go home  Short term goals  Time Frame for Short term goals: 2 weeks  Short term goal 1: Pt to be SBA for supine <> sit with no bed rail to get in/out of bed with decreased difficulty. - MET  Short term goal 2: Pt to be Supervision for sit <> stand to get up to ambulate - MET  Short term goal 3: Pt to ambulate > 60 ft with RW/4WW with Supervision for household distances - MET  Short term goal 4: Pt to negotiate 4 steps with 1-2 rails with SBA for home access - MET  Long term goals  Time Frame for Long term goals : 3 weeks  Long term goal 1: Pt to be Mod I for supine <> sit without rail to get in/out of bed - MET  Long term goal 2: Pt to be Mod I for sit <> stand to get up to ambulate - MET  Long term goal 3: Pt to ambulate > 150 ft with RW/4WW with Mod I for household and community distances - MET  Long term goal 4: Pt to negotiate 4 steps with 1 rail with Supervision for home access - NOT MET    Following session, patient left in safe position with all fall risk precautions in place. Oral Mickey.  Gia Covarrubias Stigler [address]

## 2021-02-10 NOTE — PROGRESS NOTES
[facility]  Portia Mendoza 894 UNIT  Occupational Therapy  Discharge Note  Time:   Time In: 7425  Time Out: 0840  Timed Code Treatment Minutes: 62 Minutes  Minutes: 62          Date: 2/10/2021  Patient Name: Fatemeh Cedillo,   Gender: male      Room: [address] [place] [address]  MRN: [id number]  : 1940  (79 y.o.)  Referring Practitioner: Alena Martinez MD Attending: Dr Paulo Phillip  Diagnosis: [place] 19  Additional Pertinent Hx: Pt presents to the Emergency Department for the evaluation of cough and shortness of breath. Patient reports gradual onset of symptoms over the past 5 days with significant worsening of the shortness of breath over the past 2 days. He reports unproductive cough associated with decreased sense of taste and smell and some nasal congestion over the past week. Denies any known sick contacts and has not been tested for Covid since symptom onset. He reports worsening of shortness of breath with exertion, improvement with rest.  Reports generalized weakness that occurs with activity. Pt admitted to TCU on 21 for further therapy. Restrictions/Precautions:  Restrictions/Precautions: General Precautions, Fall Risk  Position Activity Restriction  Other position/activity restrictions:  O2 on 3L and 4L with activity; 2/1 4L at rest/activity; 2/4 2L at rest/2-3L with activity--- 2/6 .5L at rest and 1L with activity; 2/8 .5L with activity, room air at rest; 2/9 room air    SUBJECTIVE: Pt lying in bed upon OT arrival, agreeable to ADL session. PAIN: did not rate    COGNITION: WFL--Good insight noted this date with good initiation of RBs and slef monitoring of O2    ADL:     EATING:Independent. Lysbeth Carrel CARE Score: 6. ORAL HYGIENE:Independent. Lysbeth Carrel CARE Score: 6. TOILETING HYGIENE:Independent. Lysbeth Carrel CARE Score: 6. SHOWERING/BATHING:Independent. Lysbeth Carrel CARE Score: 6. UPPER BODY DRESSING:Independent. Lysbeth Carrel CARE Score: 6. LOWER BODY DRESSING:Independent. Lysbeth Carrel CARE Score: 6. FOOTWEAR:Independent   . CARE Score: 6. TOILET TRANSFER: Independent. [facility] CARE Score: 6.     *Extended time and RBs required throughout ADL, but no physical assist required this date. BALANCE:  Sitting Balance:  Modified Independent. Standing Balance: Modified Independent. BED MOBILITY:  Supine to Sit: Modified Independent    Scooting: Modified Independent      TRANSFERS:  Sit to Stand:  Modified Independent. Stand to Sit: Modified Independent. FUNCTIONAL MOBILITY:  Assistive Device: 4 Wheeled Walker  Assist Level:  Modified Independent. Distance: To and from bathroom, To and from shower room and To and from therapy gym       ADDITIONAL ACTIVITIES:  Pt completed B UE exs with yellow resistance band x 10 reps, x 1 set, while following a handout. Good tolerance of exs, with min RBs throughout, and 0 cues for tech with resistance band. ASSESSMENT:  Activity Tolerance:  Patient tolerance of  treatment: good. Patient on  Room Air  upon arrival to room. Patient O2 sats at 97 %. Upon activity patient dropping O2 at 88-92 %, with quick recover above 90% and good insight noted with pt initiating use of pulse ox and RBs. Assessment:   Pt has made  excellent progress towards goals. Pt has met 4/4 STGs and 1/2 LTGs. Pt has exhibited improvement in following HEP, standing tolerance/endurance, functional mobility, ADLs, and overall endurance. Pt was admitted to unit with suplemantal O2 and was able to wean off O2 this stay. Pt with improved insight into extent of covid and need for initiating RBs/monitoring O2. Pt continues to exhibit decreased balance, endurance, safety awareness, and activity tolerance causing barriers to meeting pt's goals. Pt continues to require skilled OT intervention to improve ADL/IADL performance required for pt to return [facility].     Discharge Recommendations: Home with Home health OT  Equipment Recommendations: Equipment Needed: Yes  Other: Grand daughter purchased shower chair and pulse ox. Does not need LHAE. Plan: Times per week: 6x  Current Treatment Recommendations: Functional Mobility Training, Endurance Training, Self-Care / ADL, Patient/Caregiver Education & Training, Strengthening, Balance Training, Safety Education & Training, Equipment Evaluation, Education, & procurement, Home Management Training    Patient Education  Patient Education: ADL's, Home Exercise Program and Equipment Education    Goals  Short term goals  Time Frame for Short term goals: 1 week  Short term goal 1: Pt will complete BUE ROM/light resistive exercises with min vcs for technique to increase indep and endurance with all self cares and IADLs. MET  Short term goal 2: Pt will complete standing task x 3 minutes with 1-2 UE release and SBA while o2 stats remain avove 90% to increase indep and endurance with grooming and simple IADLs. MET  Short term goal 3: Pt will complete functional mobility HH distances with SBA and whil O2 stats remain above 90% to increase stength and endurance with all self cares and grocery shopping. MET  Short term goal 4: Pt will complete LB dressing with S, O2 stats remaining above 90% and 0 vcs for safety/taking rest breaks to increase indep in home environment. MET  Long term goals  Time Frame for Long term goals : 3 weeks  Long term goal 1: Pt will complete IADL task Mod Indep while demostrating EC techniques to increase endurance and safety with all cooking and laundry in home environment. NOT MET  Long term goal 2: Pt will complete ADL routine including shower (when out of droplet +) with Mod indep while demonstrating EC techniques to increase endurance with all self cares. MET    Following session, patient left in safe position with all fall risk precautions in place.

## 2021-02-10 NOTE — PROGRESS NOTES
[facility]  INPATIENT PHYSICAL THERAPY  DAILY NOTE  - [place] [facility] - [id number]    Time In: 1102  Time Out: 1205  Timed Code Treatment Minutes: 63 Minutes  Minutes: 63          Date: 2/10/2021  Patient Name: Noel Nguyen,  Gender:  male        MRN: [id number]  : 1940  (79 y.o.)     Referring Practitioner: Ordering: Everett Rabago MD; Attending: Omayra Ye MD     Additional Pertinent Hx: Per ED note, pt \"is a 79 y.o. male who presents to the Emergency Department for the evaluation of cough and shortness of breath. Patient reports gradual onset of symptoms over the past 5 days with significant worsening of the shortness of breath over the past 2 days. He reports unproductive cough associated with decreased sense of taste and smell and some nasal congestion over the past week. Denies any known sick contacts and has not been tested for Covid since symptom onset. He reports worsening of shortness of breath with exertion, improvement with rest.  Reports generalized weakness that occurs with activity. He has been taking Tylenol and was also started on vitamin C, vitamin D, zinc by his daughter earlier in the season. He denies any pulmonary history. He is not a smoker. He denies any associated fevers, chills, generalized body aches, headache, sore throat, vomiting, diarrhea, wheezing, edema, orthopnea or dizziness. \"     Prior Level of Function:  Lives With: Spouse  Type of Home: House  Home Layout: One level  Home Access: Stairs to enter with rails  Entrance Stairs - Number of Steps: 3-4 steps with 1 rail  Home Equipment: (none)   Bathroom Shower/Tub: Tub/Shower unit  Bathroom Toilet: Standard  Bathroom Equipment: Grab bars in shower  Bathroom Accessibility: Accessible    Receives Help From: Family  ADL Assistance: [facility] [address] [facility] [address]: Independent  Homemaking Responsibilities: Yes  Ambulation Assistance: Independent  Transfer Assistance: Independent  Active : Yes  Additional Comments: Pt reports being Independent with all mobility in PLOF. Pt shared homemaking with his spouse prior to admission. Pt's spouse is also hospitalized on this same unit due to COVID-19 virus at this time. Restrictions/Precautions:  Restrictions/Precautions: General Precautions, Fall Risk  Position Activity Restriction  Other position/activity restrictions: 1/31 O2 on 3L and 4L with activity; 2/1 4L at rest/activity; 2/4 2L at rest/2-3L with activity--- 2/6 .5L at rest and 1L with activity; 2/8 .5L with activity, room air at rest; 2/9 room air     SUBJECTIVE: Patient in recliner upon arrival and agreeable to therapy. Patient motivated for discharge and return home tomorrow. Patient on room air, O2 sats 88-93% with activity. PAIN: denies    OBJECTIVE:  Bed Mobility:  Rolling to Left: Independent   Rolling to Right: Independent   Supine to Sit: Modified Independent  Sit to Supine: Modified Independent   Scooting: Independent    Transfers:  Sit to Stand: Supervision  Stand to Sit:Supervision, occasional cues to lock 4WW brakes  Car:Modified Independent  chair>bed: Supervision, used 1ZS    Ambulation:  Supervision  Distance: level: ~200ft, 15ft, 50ft, 40ft, 20ft, 40ft, 50ft, 100ft, 100ft    Uneven: 10ft  Surface: Level Tile  Device:4 Wheeled Walker  Gait Deviations: Forward Flexed Posture, Decreased Step Length Bilaterally, Decreased Gait Speed and Decreased Heel Strike Bilaterally    Stairs:  Supervision  Number of Steps: 1 (platform step)  Height: 6\" step with 4 Wheeled Walker, performed x2 trials, first trial required cues for technique, second trial able to complete without cues  Stairs:  Supervision  Number of Steps: 12  Height: 6\" step with Bilateral Handrails    Balance:  Dynamic Standing Balance: Modified Independent, Patient stood at 4WW and used reacher to  object from floor level.     Exercise:  NuStep level 3 for ~3min and ~5min    Functional Outcome Measures: Completed   IRF-ANDRES completed 2/10    ASSESSMENT:  Assessment: Patient progressing toward established goals. Activity Tolerance:  Patient tolerance of  treatment: good. Equipment Recommendations: Other: monitor for needs - poss RW at discharge? Discharge Recommendations:  Home with Home health PT    Plan: Times per week: 5x BID, 1x QD  Current Treatment Recommendations: Strengthening, Gait Training, Patient/Caregiver Education & Training, Equipment Evaluation, Education, & procurement, Balance Training, Functional Mobility Training, Endurance Training, Transfer Training, Safety Education & Training, Home Exercise Program, Stair training    Patient Education  Patient Education: Plan of Care, Precautions/Restrictions, [employer] Mobility, Equipment Education, Transfers, Reviewed Prior Education, Gait, Stairs, Car Transfers, Up in Chair for All Meals    Goals:  Patient goals : go home  Short term goals  Time Frame for Short term goals: 2 weeks  Short term goal 1: Pt to be SBA for supine <> sit with no bed rail to get in/out of bed with decreased difficulty. Short term goal 2: Pt to be Supervision for sit <> stand to get up to ambulate  Short term goal 3: Pt to ambulate > 60 ft with RW/4WW with Supervision for household distances  Short term goal 4: Pt to negotiate 4 steps with 1-2 rails with SBA for home access  Long term goals  Time Frame for Long term goals : 3 weeks  Long term goal 1: Pt to be Mod I for supine <> sit without rail to get in/out of bed  Long term goal 2: Pt to be Mod I for sit <> stand to get up to ambulate  Long term goal 3: Pt to ambulate > 150 ft with RW/4WW with Mod I for household and community distances  Long term goal 4: Pt to negotiate 4 steps with 1 rail with Supervision for home access    Following session, patient left in safe position with all fall risk precautions in place.

## 2021-02-10 NOTE — DISCHARGE INSTR - DIET
DIET GENERAL     Good nutrition is important when healing from an illness, injury, or surgery. Follow any nutrition recommendations given to you during your hospital stay.  If you were given an oral nutrition supplement while in the hospital, continue to take this supplement at home. You can take it with meals, in-between meals, and/or before bedtime. These supplements can be purchased at most local grocery stores, pharmacies, and chain super-stores.  If you have any questions about your diet or nutrition, call the hospital and ask for the dietitian.

## 2021-02-10 NOTE — PLAN OF CARE
Problem: Airway Clearance - Ineffective  Goal: Achieve or maintain patent airway  Outcome: Ongoing  Note: Patient maintains airway with no complications  Problem: Gas Exchange - Impaired  Goal: Absence of hypoxia  Outcome: Ongoing  Note: Patient sats 94% on room air. Problem: Gas Exchange - Impaired  Goal: Promote optimal lung function  2/10/2021 0329 by Lakhwinder Ramirez RN  Outcome: Ongoing  Note: Patient continues to use incentive sprirometer and acapella. Problem: Breathing Pattern - Ineffective  Goal: Ability to achieve and maintain a regular respiratory rate  Outcome: Ongoing  Note: Respiratory with in normal limits at rest and with exertion     Problem: Body Temperature -  Risk of, Imbalanced  Goal: Ability to maintain a body temperature within defined limits  Outcome: Ongoing  Note:   Vitals:    02/09/21 1946   BP: (!) 137/59   Pulse: 66   Resp: 20   Temp: 97.4 °F (36.3 °C)   SpO2: 94%         Problem: Loneliness or Risk for Loneliness  Goal: Demonstrate positive use of time alone when socialization is not possible  Outcome: Ongoing     Problem: Fatigue  Goal: Verbalize increase energy and improved vitality  Outcome: Ongoing     Problem: Patient Education: Go to Patient Education Activity  Goal: Patient/Family Education  Outcome: Ongoing     Problem: Falls - Risk of:  Goal: Will remain free from falls  Description: Will remain free from falls  2/10/2021 0329 by Lakhwinder Ramirez RN  Outcome: Ongoing  Note: No falls noted. Patient uses call light appropriately to alert staff to needs. Patient wears non slip slippers and gait belt when ambulating. 2/9/2021 1350 by Ana Luisa Spring RN  Outcome: Ongoing  Note: Patient is on falling star program. When ambulating he wears non skid socks or shoes and a gait belt. He also uses a walker. Patient uses call light appropriately when needed assistance from staff members. Bed and chair alarms are in use. Call light within reach.       Problem: Falls - Risk of:  Goal: Absence of physical injury  Description: Absence of physical injury  Outcome: Ongoing     Problem: Pain:  Goal: Pain level will decrease  Description: Pain level will decrease  2/10/2021 0329 by Best Thayer RN  Outcome: Ongoing  Note: Patient rates pain 2/10 on side. PRN pain medication available. Repositioning and rest were also effective     Problem: Pain:  Goal: Control of acute pain  Description: Control of acute pain  Outcome: Ongoing   Care plan reviewed with patient. Patient verbalizes understanding of care plan and treatment goals.

## 2021-02-11 VITALS
HEIGHT: 66 IN | TEMPERATURE: 97.3 F | OXYGEN SATURATION: 94 % | RESPIRATION RATE: 18 BRPM | WEIGHT: 161.2 LBS | DIASTOLIC BLOOD PRESSURE: 60 MMHG | HEART RATE: 81 BPM | SYSTOLIC BLOOD PRESSURE: 136 MMHG | BODY MASS INDEX: 25.91 KG/M2

## 2021-02-11 PROCEDURE — 6370000000 HC RX 637 (ALT 250 FOR IP): Performed by: INTERNAL MEDICINE

## 2021-02-11 PROCEDURE — 94761 N-INVAS EAR/PLS OXIMETRY MLT: CPT

## 2021-02-11 RX ORDER — ASCORBIC ACID 500 MG
500 TABLET ORAL DAILY
COMMUNITY
Start: 2021-02-12

## 2021-02-11 RX ORDER — CHOLECALCIFEROL (VITAMIN D3) 25 MCG
1000 TABLET ORAL DAILY
COMMUNITY
Start: 2021-02-12

## 2021-02-11 RX ORDER — ASPIRIN 81 MG/1
81 TABLET, CHEWABLE ORAL DAILY
COMMUNITY
Start: 2021-02-12

## 2021-02-11 RX ADMIN — LOSARTAN POTASSIUM 100 MG: 100 TABLET, FILM COATED ORAL at 07:50

## 2021-02-11 RX ADMIN — Medication 1000 UNITS: at 07:50

## 2021-02-11 RX ADMIN — Medication 50 MG: at 07:50

## 2021-02-11 RX ADMIN — OXYCODONE HYDROCHLORIDE AND ACETAMINOPHEN 500 MG: 500 TABLET ORAL at 07:50

## 2021-02-11 RX ADMIN — PANTOPRAZOLE SODIUM 40 MG: 40 TABLET, DELAYED RELEASE ORAL at 05:56

## 2021-02-11 RX ADMIN — ASPIRIN 81 MG: 81 TABLET, CHEWABLE ORAL at 07:50

## 2021-02-11 RX ADMIN — AMLODIPINE BESYLATE 5 MG: 5 TABLET ORAL at 07:50

## 2021-02-11 NOTE — PLAN OF CARE
2/11/21, 10:54 AM EST    Patient goals/plan/ treatment preferences discussed by Khurram Harris  and . Patient goals/plan/ treatment preferences reviewed with patient/ family. Patient is discharging home with home health services through requested [facility],PT-OT-RN-Aide. Patient/ family verbalized understanding of discharge plan and are in agreement with goal/plan/treatment preferences. Understanding was demonstrated using the teach back method. AVS provided by RN at time of discharge, which includes all necessary medical information pertaining to the patients current course of illness, treatment, post-discharge goals of care, and treatment preferences.

## 2021-02-11 NOTE — PROGRESS NOTES
Patient discharged home with home health services with daughter. Discharge instructions and personal belongings given to patient. Voiced understanding of discharge instructions. Denied questions.

## 2021-02-11 NOTE — PROGRESS NOTES
A home oxygen evaluation has been completed. [x]Patient is an inpatient. It is expected that the patient will be discharged within the next 48 hours. Qualified provider to write orders for possible sleep study or home oxygen prescription. Social service/care managers will arrange for home oxygen if ordered. If patient is active, arrange for Home Medical supplier to assess for Oxygen Conserving Device per pulse oximetry. []Patient is an outpatient. Results will be faxed to the ordering provider. Qualified provider to write orders for possible sleep study or home oxygen prescription. Patient was placed on room air for 4 days. SpO2 was 96 % on room air at rest. Patients SpO2 was 89% or above and did not qualify for home oxygen. Patient was walked for 6 minutes. SpO2 was 91 % during walking. Patients SpO2 was 89% or above and did not qualify for home oxygen. Patient does not have a positive pressure airway device at home. Patient is not  diagnosed with Obstructive Sleep Apnea. Patient will not be set up/instructed on a nocturnal study. Results will be given to qualified provider. Note: For any SpO2 at 02% see policy and procedure for possible qualifications.

## 2021-02-11 NOTE — PROGRESS NOTES
[facility]  Recreational Therapy  Discharge Note  Transitional Care Unit           Date:  2/11/2021       Patient Name: Tammi Cha      MRN: [id number]       YOB: 1940 (79 y.o.)       Gender: male  Diagnosis: COVID 23  Referring Practitioner: Darlene Seals MD Attending: Dr Kenyon Green    Patient discharged from Recreational Therapy at this time. See recreational therapy notes for details.     Electronically signed by: LATA Pierre  Date: 2/11/2021

## 2021-02-11 NOTE — DISCHARGE SUMMARY
TCU  Discharge Summary     Patient Identification:  Michelet Cha  : 1940  Admit date: 2021  Discharge date: 21   Attending provider: Amos Page MD        Primary care provider: Juan Meade MD     Discharge Diagnoses: Active Hospital Problems    Diagnosis Date Noted    Pneumonia due to COVID-19 virus [U07.1, J12.82] 2021    Muscular deconditioning [R29.898] 2021    Essential hypertension [I10] 2021       TCU Course:   Michelet Cha is a 79 y.o. male admitted to the transitional care unit on 2021 for the continued time with therapies following the acute hospital stay for covid-19 pneumonia. He worked with therapies and showed progress in strength and independence. He will however still need Home Health services following discharge. He was medically stable during the stay and will be discharged to home in stable condition. Consults:   none    Significant Diagnostics:   Results for Fercho Buckley (MRN [id number]) as of 2021 10:45   Ref. Range 2021 04:41   Sodium Latest Ref Range: 135 - 145 meq/L 134 (L)   Potassium Latest Ref Range: 3.5 - 5.2 meq/L 4.4   Chloride Latest Ref Range: 98 - 111 meq/L 101   CO2 Latest Ref Range: 23 - 33 meq/L 26   BUN Latest Ref Range: 7 - 22 mg/dL 14   Creatinine Latest Ref Range: 0.4 - 1.2 mg/dL 0.7   Anion Gap Latest Ref Range: 8.0 - 16.0 meq/L 7.0 (L)   Est, Glom Filt Rate Latest Units: ml/min/1.73m2 >90   Glucose Latest Ref Range: 70 - 108 mg/dL 82   Calcium Latest Ref Range: 8.5 - 10.5 mg/dL 8.4 (L)       No results for input(s): POCGLU in the last 72 hours. Patient Instructions:       Follow-up visits: See after visit summary from hospitalization    Discharge Medications:   [facility]   Home Medication Instructions IPT:[id number]    Printed on:21 1042   Medication Information                      amLODIPine (NORVASC) 5 MG tablet  Take 5 mg by mouth daily             ascorbic acid (VITAMIN C) 500 MG tablet  Take 1 tablet by mouth daily             aspirin 81 MG chewable tablet  Take 1 tablet by mouth daily             Cholecalciferol (VITAMIN D) 25 MCG TABS  Take 1 tablet by mouth daily             losartan (COZAAR) 100 MG tablet  Take 100 mg by mouth daily             Multiple Vitamins-Minerals (ICAPS AREDS 2 PO)  Take by mouth daily                 35 minutes spent preparing the patient for discharge    Vivian Proctor MD

## 2021-02-12 ENCOUNTER — CARE COORDINATION (OUTPATIENT)
Dept: CASE MANAGEMENT | Age: 81
End: 2021-02-12

## 2021-02-12 NOTE — CARE COORDINATION
Patient contacted regarding [id number] diagnosis\". Discussed COVID-19 related testing which was available at this time. Test results were positive. Patient informed of results, if available? Yes    Care Transition Nurse/ Ambulatory Care Manager contacted the patient by telephone to perform post discharge assessment. Call within 2 business days of discharge: Yes. Verified name and  with patient as identifiers. Provided introduction to self, and explanation of the CTN/ACM role, and reason for call due to risk factors for infection and/or exposure to COVID-19. Symptoms reviewed with patient who verbalized the following symptoms: shortness of breath. Due to new onset of symptoms encounter was not routed to provider for escalation. Discussed follow-up appointments. If no appointment was previously scheduled, appointment scheduling offered: Yes  [facility] follow up appointment(s): 21  Non-[facility] follow up appointment(s): N/A    Non-face-to-face services provided:  Obtained and reviewed discharge summary and/or continuity of care documents     Advance Care Planning:   Does patient have an Advance Directive:  decision maker updated. Patient has following risk factors of: no known risk factors. CTN/ACM reviewed discharge instructions, medical action plan and red flags such as increased shortness of breath, increasing fever and signs of decompensation with patient who verbalized understanding. Discussed exposure protocols and quarantine with CDC Guidelines What to do if you are sick with coronavirus disease .  Patient was given an opportunity for questions and concerns. The patient agrees to contact the [facility] exposure line [phone], local [facility] department [place] [facility]: ([phone]) and PCP office for questions related to their healthcare. CTN/ACM provided contact information for future needs.     Reviewed and educated patient on any new and changed medications related to discharge diagnosis     Patient/family/caregiver given information for [employer] Loop and agrees to enroll no  Patient's preferred e-mail: N/A  Patient's preferred phone number:on file   Based on Loop alert triggers, patient will be contacted by nurse care manager for worsening symptoms. Plan for follow-up call in 5-7 days based on severity of symptoms and risk factors.

## 2021-02-17 ENCOUNTER — CARE COORDINATION (OUTPATIENT)
Dept: CASE MANAGEMENT | Age: 81
End: 2021-02-17

## 2021-02-17 NOTE — CARE COORDINATION
Covid-19 Outreach call, no answer.   Left VM with contact information and request  for return call at [address], [address] [place] [address] Coordination Transition

## 2021-02-24 ENCOUNTER — CARE COORDINATION (OUTPATIENT)
Dept: CASE MANAGEMENT | Age: 81
End: 2021-02-24

## 2021-02-24 NOTE — CARE COORDINATION
Date/Time:  2/24/2021 1:19 PM  Attempted to reach patient by telephone. for Covid 19 follow up  Left HIPPA compliant message requesting a return call at [phone]. Will attempt to reach patient again.

## 2022-02-08 ENCOUNTER — HOSPITAL ENCOUNTER (OUTPATIENT)
Age: 82
Discharge: HOME | End: 2022-02-08
Payer: MEDICARE

## 2022-02-08 DIAGNOSIS — Z13.6 SCREENING FOR ISCHEMIC HEART DISEASE: ICD-10-CM

## 2022-02-08 DIAGNOSIS — I10 ESSENTIAL HYPERTENSION: ICD-10-CM

## 2022-02-08 LAB
ANION GAP SERPL CALCULATED.3IONS-SCNC: 12 MEQ/L (ref 8–16)
BASOPHILS # BLD: 0.3 %
BASOPHILS ABSOLUTE: 0 THOU/MM3 (ref 0–0.1)
BUN BLDV-MCNC: 21 MG/DL (ref 7–22)
CALCIUM SERPL-MCNC: 9.5 MG/DL (ref 8.5–10.5)
CHLORIDE BLD-SCNC: 104 MEQ/L (ref 98–111)
CHOLESTEROL, TOTAL: 150 MG/DL (ref 100–199)
CO2: 25 MEQ/L (ref 23–33)
CREAT SERPL-MCNC: 0.9 MG/DL (ref 0.4–1.2)
EOSINOPHIL # BLD: 1.3 %
EOSINOPHILS ABSOLUTE: 0.1 THOU/MM3 (ref 0–0.4)
ERYTHROCYTE [DISTWIDTH] IN BLOOD BY AUTOMATED COUNT: 12.4 % (ref 11.5–14.5)
ERYTHROCYTE [DISTWIDTH] IN BLOOD BY AUTOMATED COUNT: 49 FL (ref 35–45)
GFR SERPL CREATININE-BSD FRML MDRD: 81 ML/MIN/1.73M2
GLUCOSE BLD-MCNC: 84 MG/DL (ref 70–108)
HCT VFR BLD CALC: 40.8 % (ref 42–52)
HDLC SERPL-MCNC: 53 MG/DL
HEMOGLOBIN: 13.1 GM/DL (ref 14–18)
IMMATURE GRANS (ABS): 0.02 THOU/MM3 (ref 0–0.07)
IMMATURE GRANULOCYTES: 0.2 %
LDL CHOLESTEROL CALCULATED: 75 MG/DL
LYMPHOCYTES # BLD: 35.6 %
LYMPHOCYTES ABSOLUTE: 3.1 THOU/MM3 (ref 1–4.8)
MCH RBC QN AUTO: 34 PG (ref 26–33)
MCHC RBC AUTO-ENTMCNC: 32.1 GM/DL (ref 32.2–35.5)
MCV RBC AUTO: 106 FL (ref 80–94)
MONOCYTES # BLD: 7.8 %
MONOCYTES ABSOLUTE: 0.7 THOU/MM3 (ref 0.4–1.3)
NUCLEATED RED BLOOD CELLS: 0 /100 WBC
PLATELET # BLD: 181 THOU/MM3 (ref 130–400)
PMV BLD AUTO: 10.7 FL (ref 9.4–12.4)
POTASSIUM SERPL-SCNC: 5 MEQ/L (ref 3.5–5.2)
RBC # BLD: 3.85 MILL/MM3 (ref 4.7–6.1)
SEG NEUTROPHILS: 54.8 %
SEGMENTED NEUTROPHILS ABSOLUTE COUNT: 4.8 THOU/MM3 (ref 1.8–7.7)
SODIUM BLD-SCNC: 141 MEQ/L (ref 135–145)
TRIGL SERPL-MCNC: 108 MG/DL (ref 0–199)
WBC # BLD: 8.7 THOU/MM3 (ref 4.8–10.8)

## 2022-02-08 PROCEDURE — 80061 LIPID PANEL: CPT

## 2022-02-08 PROCEDURE — 36415 COLL VENOUS BLD VENIPUNCTURE: CPT

## 2022-02-08 PROCEDURE — 80048 BASIC METABOLIC PNL TOTAL CA: CPT

## 2022-02-08 PROCEDURE — 85025 COMPLETE CBC W/AUTO DIFF WBC: CPT

## 2022-04-07 ENCOUNTER — HOSPITAL ENCOUNTER (OUTPATIENT)
Age: 82
Discharge: HOME | End: 2022-04-07
Payer: MEDICARE

## 2022-04-07 LAB
ANION GAP SERPL CALCULATED.3IONS-SCNC: 12 MEQ/L (ref 8–16)
BUN BLDV-MCNC: 20 MG/DL (ref 7–22)
CALCIUM SERPL-MCNC: 9.5 MG/DL (ref 8.5–10.5)
CHLORIDE BLD-SCNC: 101 MEQ/L (ref 98–111)
CO2: 26 MEQ/L (ref 23–33)
CREAT SERPL-MCNC: 0.9 MG/DL (ref 0.4–1.2)
EKG ATRIAL RATE: 55 BPM
EKG P AXIS: -4 DEGREES
EKG Q-T INTERVAL: 398 MS
EKG QRS DURATION: 100 MS
EKG QTC CALCULATION (BAZETT): 394 MS
EKG R AXIS: -36 DEGREES
EKG T AXIS: 1 DEGREES
EKG VENTRICULAR RATE: 59 BPM
ERYTHROCYTE [DISTWIDTH] IN BLOOD BY AUTOMATED COUNT: 12.6 % (ref 11.5–14.5)
ERYTHROCYTE [DISTWIDTH] IN BLOOD BY AUTOMATED COUNT: 48.3 FL (ref 35–45)
GFR SERPL CREATININE-BSD FRML MDRD: 81 ML/MIN/1.73M2
GLUCOSE BLD-MCNC: 90 MG/DL (ref 70–108)
HCT VFR BLD CALC: 39.5 % (ref 42–52)
HEMOGLOBIN: 12.8 GM/DL (ref 14–18)
MCH RBC QN AUTO: 33.6 PG (ref 26–33)
MCHC RBC AUTO-ENTMCNC: 32.4 GM/DL (ref 32.2–35.5)
MCV RBC AUTO: 103.7 FL (ref 80–94)
PLATELET # BLD: 171 THOU/MM3 (ref 130–400)
PMV BLD AUTO: 10.1 FL (ref 9.4–12.4)
POTASSIUM SERPL-SCNC: 4.5 MEQ/L (ref 3.5–5.2)
RBC # BLD: 3.81 MILL/MM3 (ref 4.7–6.1)
SODIUM BLD-SCNC: 139 MEQ/L (ref 135–145)
WBC # BLD: 7.9 THOU/MM3 (ref 4.8–10.8)

## 2022-04-07 PROCEDURE — 36415 COLL VENOUS BLD VENIPUNCTURE: CPT

## 2022-04-07 PROCEDURE — 80048 BASIC METABOLIC PNL TOTAL CA: CPT

## 2022-04-07 PROCEDURE — 93010 ELECTROCARDIOGRAM REPORT: CPT | Performed by: INTERNAL MEDICINE

## 2022-04-07 PROCEDURE — 85027 COMPLETE CBC AUTOMATED: CPT

## 2022-04-07 PROCEDURE — 93005 ELECTROCARDIOGRAM TRACING: CPT | Performed by: SPECIALIST

## 2022-04-22 NOTE — PROGRESS NOTES
EKG 4/7/22 given to anesthesia for review.  Per Dr. Feldman Found ok to proceed at surgery center 4/29 without further intervention

## 2022-04-22 NOTE — PROGRESS NOTES
In preparation for their surgical procedure above patient was screened for Obstructive Sleep Apnea (JOHN) using the STOP-Bang Questionnaire by the Pre-Admission Testing department. This is a pre-surgical screening tool for patient safety and serves as a recommendation, this WILL NOT cause cancellation of surgery. STOP-Bang Questionnaire  * Do you currently see a pulmonologist?  No     If yes STOP, do not complete. Patient follows with Dr.     1.  Do you snore loudly (able to be heard in the next room)? No    2. Do you often feel tired or sleepy during the daytime? No       3. Has anyone ever told you that you stop breathing during your sleep? No    4. Do you have or are you being treated for high blood pressure? Yes      5. BMI more than 35? BMI (Calculated): 23.7        No    6. Age over 48 years? 79 y.o. Yes    7. Neck Circumference greater than 17 inches for male or 16 inches for female? Measured           (visits only)            Not Applicable    8. Gender Male? Yes      TOTAL SCORE: 3    JOHN - Low Risk : Yes to 0 - 2 questions  JOHN - Intermediate Risk : Yes to 3 - 4 questions  JOHN - High Risk : Yes to 5 - 8 questions    Adapted from:   STOP Questionnaire: A Tool to Screen Patients for Obstructive Sleep Apnea   [facility] Dye[facility]., Karon Danielson M.B.B.S., Glenn Campos M.D., Neelam Gray. Sherif Kelly, Ph.D., ABNER Arriaza.B.B.S., Richa Gurrola M.Sc., Maurice Khalil M.D., Adriana Medellin. [facility]    Anesthesiology 2008; 214:078-88 Copyright 2008, the [address] [employer], [facility].   ----------------------------------------------------------------------------------------------------------------

## 2022-04-22 NOTE — PROGRESS NOTES
NPO after midnight  [employer] and drivers license  Wear comfortable clean clothing  Do not bring jewelry  Shower night before and morning of surgery with a liquid antibacterial soap  Bring list of medications with dosage and how often taken  Follow all instructions given by your physician   needed at discharge  No visitors allowed in with patient at this time  Please bring and wear mask  Call [phone] for any questions

## 2022-04-29 ENCOUNTER — HOSPITAL ENCOUNTER (OUTPATIENT)
Age: 82
Setting detail: OUTPATIENT SURGERY
Discharge: HOME | End: 2022-04-29
Attending: SPECIALIST | Admitting: SPECIALIST
Payer: MEDICARE

## 2022-04-29 ENCOUNTER — ANESTHESIA (OUTPATIENT)
Dept: OPERATING ROOM | Age: 82
End: 2022-04-29
Payer: MEDICARE

## 2022-04-29 ENCOUNTER — ANESTHESIA EVENT (OUTPATIENT)
Dept: OPERATING ROOM | Age: 82
End: 2022-04-29
Payer: MEDICARE

## 2022-04-29 VITALS
DIASTOLIC BLOOD PRESSURE: 59 MMHG | SYSTOLIC BLOOD PRESSURE: 121 MMHG | RESPIRATION RATE: 16 BRPM | OXYGEN SATURATION: 99 %

## 2022-04-29 VITALS
OXYGEN SATURATION: 98 % | HEART RATE: 69 BPM | TEMPERATURE: 97 F | WEIGHT: 166.8 LBS | BODY MASS INDEX: 25.28 KG/M2 | DIASTOLIC BLOOD PRESSURE: 67 MMHG | SYSTOLIC BLOOD PRESSURE: 150 MMHG | HEIGHT: 68 IN | RESPIRATION RATE: 18 BRPM

## 2022-04-29 PROCEDURE — 7100000010 HC PHASE II RECOVERY - FIRST 15 MIN: Performed by: SPECIALIST

## 2022-04-29 PROCEDURE — 6360000002 HC RX W HCPCS: Performed by: NURSE ANESTHETIST, CERTIFIED REGISTERED

## 2022-04-29 PROCEDURE — 3700000000 HC ANESTHESIA ATTENDED CARE: Performed by: SPECIALIST

## 2022-04-29 PROCEDURE — 2580000003 HC RX 258: Performed by: SPECIALIST

## 2022-04-29 PROCEDURE — 3600000002 HC SURGERY LEVEL 2 BASE: Performed by: SPECIALIST

## 2022-04-29 PROCEDURE — 7100000011 HC PHASE II RECOVERY - ADDTL 15 MIN: Performed by: SPECIALIST

## 2022-04-29 PROCEDURE — 3600000012 HC SURGERY LEVEL 2 ADDTL 15MIN: Performed by: SPECIALIST

## 2022-04-29 PROCEDURE — 3700000001 HC ADD 15 MINUTES (ANESTHESIA): Performed by: SPECIALIST

## 2022-04-29 PROCEDURE — 2709999900 HC NON-CHARGEABLE SUPPLY: Performed by: SPECIALIST

## 2022-04-29 PROCEDURE — 6360000002 HC RX W HCPCS: Performed by: SPECIALIST

## 2022-04-29 PROCEDURE — 2500000003 HC RX 250 WO HCPCS: Performed by: NURSE ANESTHETIST, CERTIFIED REGISTERED

## 2022-04-29 PROCEDURE — 2500000003 HC RX 250 WO HCPCS: Performed by: SPECIALIST

## 2022-04-29 RX ORDER — LIDOCAINE HYDROCHLORIDE 20 MG/ML
INJECTION, SOLUTION EPIDURAL; INFILTRATION; INTRACAUDAL; PERINEURAL PRN
Status: DISCONTINUED | OUTPATIENT
Start: 2022-04-29 | End: 2022-04-29 | Stop reason: SDUPTHER

## 2022-04-29 RX ORDER — PROPOFOL 10 MG/ML
INJECTION, EMULSION INTRAVENOUS PRN
Status: DISCONTINUED | OUTPATIENT
Start: 2022-04-29 | End: 2022-04-29 | Stop reason: SDUPTHER

## 2022-04-29 RX ORDER — LIDOCAINE HYDROCHLORIDE AND EPINEPHRINE 10; 10 MG/ML; UG/ML
INJECTION, SOLUTION INFILTRATION; PERINEURAL PRN
Status: DISCONTINUED | OUTPATIENT
Start: 2022-04-29 | End: 2022-04-29 | Stop reason: ALTCHOICE

## 2022-04-29 RX ORDER — FENTANYL CITRATE 50 UG/ML
INJECTION, SOLUTION INTRAMUSCULAR; INTRAVENOUS PRN
Status: DISCONTINUED | OUTPATIENT
Start: 2022-04-29 | End: 2022-04-29 | Stop reason: SDUPTHER

## 2022-04-29 RX ORDER — SODIUM CHLORIDE 9 MG/ML
INJECTION, SOLUTION INTRAVENOUS CONTINUOUS
Status: DISCONTINUED | OUTPATIENT
Start: 2022-04-29 | End: 2022-04-29 | Stop reason: HOSPADM

## 2022-04-29 RX ADMIN — FENTANYL CITRATE 25 MCG: 50 INJECTION, SOLUTION INTRAMUSCULAR; INTRAVENOUS at 12:49

## 2022-04-29 RX ADMIN — FENTANYL CITRATE 25 MCG: 50 INJECTION, SOLUTION INTRAMUSCULAR; INTRAVENOUS at 12:29

## 2022-04-29 RX ADMIN — Medication 2000 MG: at 12:23

## 2022-04-29 RX ADMIN — PROPOFOL 40 MG: 10 INJECTION, EMULSION INTRAVENOUS at 12:21

## 2022-04-29 RX ADMIN — FENTANYL CITRATE 50 MCG: 50 INJECTION, SOLUTION INTRAMUSCULAR; INTRAVENOUS at 12:19

## 2022-04-29 RX ADMIN — LIDOCAINE HYDROCHLORIDE 70 MG: 20 INJECTION, SOLUTION EPIDURAL; INFILTRATION; INTRACAUDAL; PERINEURAL at 12:20

## 2022-04-29 RX ADMIN — PROPOFOL 30 MG: 10 INJECTION, EMULSION INTRAVENOUS at 12:49

## 2022-04-29 RX ADMIN — SODIUM CHLORIDE: 9 INJECTION, SOLUTION INTRAVENOUS at 12:17

## 2022-04-29 ASSESSMENT — PULMONARY FUNCTION TESTS
PIF_VALUE: 0

## 2022-04-29 ASSESSMENT — PAIN SCALES - GENERAL: PAINLEVEL_OUTOF10: 0

## 2022-04-29 ASSESSMENT — PAIN - FUNCTIONAL ASSESSMENT: PAIN_FUNCTIONAL_ASSESSMENT: 0-10

## 2022-04-29 NOTE — PROGRESS NOTES
1311: Patient arrived to room via chair, head dressing in place, no bleeding noted, Iv infusing via gravity, family at bedside. 1320: patient ambulated to restroom without difficulty. 1345: Discharge instructions reviewed with patient and family member. All questions were addressed and answered. Patient and family member verbalized understanding of discharge plan. 1350: patient getting dressed. 1355: patient ambulated to discharge lobby in stable condition. Patient discharged home with family.

## 2022-04-29 NOTE — OP NOTE
Operative Note    Patient name: Kimber Manzanares             Medical Record Number: [id number]    Primary Care Physician: Nanci Aguirre MD     1940    Date of Procedure: 2022    Pre-operative Diagnosis: 4cm2 defect of right posterior upper scalp s/p MOHS for squamous cell carcinoma    Post-operative Diagnosis: Same    Procedure Performed: Full thickness skin graft (4 cm2) repair of right posterior upper scalp defect (CPT 04277)    Surgeons/Assistants: Dr. Edu Norton MD     Estimated Blood Loss: 5ml     Complications: none immediately appreciated    Procedure: With the patient lying in the supine position and under adequate anesthesia per the anesthesia team.   Local anesthesia consisting of 19 ml of 1% Lidocaine 1:100,000 with epinephrine solution of the donor site of the occipital scalp as well as the right upper posterior scalp defect. The area was prepped and draped in the standard surgical fashion. Pathology called back to the room and stated the margin were free. The patient has very thin skin and a 4cm2 defect which could not be closed primarily due to tension and also numerous neighboring scars from previous operations. Therefore along a right occipital scalp scar a hair bearing 4cm2 graft was taken. It was defatted and secured in position with a 3-0 silk suture tie and then a 5-0 fast absorbable suture was placed in a simple running fashion. A Bacitracin Xeroform and moist cotton ball tie over bolster was secured using the tails of the silk sutures. The donor site was closed with skin staples. Bacitracin with bulky sterile head wrap was applied. The patient tolerated the procedure well and remained hemodynamically stable throughout the procedure and was quite comfortable throughout the operative course.     Clinical staging for cancer cases:  Ct  Cn  Cm    Edu Norton MD  Electronically signed by me on 2022 at 1:06 PM  Operative Note      Patient: Kimber Manzanares  Date of Birth: 1940  MRN: [id number]    Date of Procedure: 4/29/2022    Pre-Op Diagnosis: SCC RIGHT POSTERIOR UPPER SCALP    Post-Op Diagnosis: Same       Procedure(s):  MOHS DEFECT REPAIR SCC RIGHT POSTERIOR UPPER SCALP-FTSG FROM SCALP FLAP TISSUE    Surgeon(s):  Kassidy Baldwin MD    Assistant:   * No surgical staff found *    Anesthesia: Monitor Anesthesia Care    Estimated Blood Loss (mL): Minimal    Complications: None    Specimens:   * No specimens in log *    Implants:  * No implants in log *      Drains: * No LDAs found *    Findings: 4cm2 defect of right posterior upper scalp s/p MOHS for squamous cell carcinoma      Detailed Description of Procedure:   Full thickness skin graft (4 cm2) repair of right posterior upper scalp defect (CPT 62332)      Electronically signed by Kassidy Baldwin MD on 4/29/2022 at 1:06 PM

## 2022-04-29 NOTE — ANESTHESIA POSTPROCEDURE EVALUATION
Department of Anesthesiology  Postprocedure Note    Patient: Kavya lPata  MRN: [id number]  YOB: 1940  Date of evaluation: 4/29/2022  Time:  2:05 PM     Procedure Summary     Date: 04/29/22 Room / Location: [address] [place] [address] 01 / [facility]    Anesthesia Start: 1215 Anesthesia Stop: 1310    Procedure: MOHS DEFECT REPAIR SCC RIGHT POSTERIOR UPPER SCALP-FTSG FROM SCALP FLAP TISSUE (Right Face) Diagnosis: (SCC RIGHT POSTERIOR UPPER SCALP)    Surgeons: Charlotte Hurd MD Responsible Provider: Ben Zepeda MD    Anesthesia Type: MAC ASA Status: 2          Anesthesia Type: MAC    Jose Phase I:      Jose Phase II: Jose Score: 10    Last vitals: Reviewed and per EMR flowsheets.        Anesthesia Post Evaluation    Patient location during evaluation: PACU  Patient participation: complete - patient participated  Level of consciousness: awake and alert  Airway patency: patent  Nausea & Vomiting: no nausea  Complications: no  Cardiovascular status: blood pressure returned to baseline and hemodynamically stable  Respiratory status: acceptable and spontaneous ventilation  Hydration status: euvolemic

## 2022-04-29 NOTE — ANESTHESIA PRE PROCEDURE
Department of Anesthesiology  Preprocedure Note       Name:  Gonzalez Aaron   Age:  79 y.o.  :  1940                                          MRN:  [id number]         Date:  2022      Surgeon: Kimo Bain):  Elaine Crouch MD    Procedure: Procedure(s):  MOHS DEFECT REPAIR SCC RIGHT POSTERIOR UPPER SCALP-FTSG    Medications prior to admission:   Prior to Admission medications    Medication Sig Start Date End Date Taking? Authorizing Provider   losartan (COZAAR) 100 MG tablet Take 1 tablet by mouth daily 22   Johnny Santos MD   amLODIPine (NORVASC) 5 MG tablet Take 1 tablet by mouth daily 22   Johnny Santos MD   aspirin 81 MG chewable tablet Take 1 tablet by mouth daily 21   Rigo Maher MD   ascorbic acid (VITAMIN C) 500 MG tablet Take 1 tablet by mouth daily 21   Rigo Maher MD   Cholecalciferol (VITAMIN D) 25 MCG TABS Take 1 tablet by mouth daily 21   Rigo Maher MD   Multiple Vitamins-Minerals (ICAPS AREDS 2 PO) Take by mouth daily    Historical Provider, MD       Current medications:    Current Facility-Administered Medications   Medication Dose Route Frequency Provider Last Rate Last Admin    0.9 % sodium chloride infusion   IntraVENous Continuous Elaine Crouch MD        ceFAZolin (ANCEF) 2000 mg in sterile water 20 mL IV syringe  2,000 mg IntraVENous Once Elaine Crouch MD           Allergies:  No Known Allergies    Problem List:    Patient Active Problem List   Diagnosis Code    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, I96    Acute hypoxemic respiratory failure due to COVID-19 (HCC) U07.1, J96.01    Essential hypertension I10    Pneumonia due to COVID-19 virus U07.1, J12.82    Muscular deconditioning R29.898       Past Medical History: Diagnosis Date    Arthritis     Cancer [facility]) 1977    kidney, right - surgery & radiation    Cancer ([facility] [address]     skin    Hypertension        Past Surgical History:        Procedure Laterality Date    ABDOMEN SURGERY Bilateral 12/12/2018    LESION LEFT FLANK AND RIGHT UPPER LATERAL ABDOMEN performed by Vikas Covarrubias MD at [facility]      last one 2013    EAR SURGERY Right 4/26/2019    MOHS REPAIR SCC RIGHT HELICAL RIM WITH FULL THICKNESS SKIN GRAFT performed by Vikas Covarrubias MD at [address] [facility] [address]    right    MOHS SURGERY  03/16/2018    MOHS repair SCC at apex of scalp    MOHS SURGERY N/A 03/21/2018    Mohs repair of SCC of apex scalp    MOHS SURGERY  03/23/2018    Closure of MOHS of scalp    [place] OFFICE/OUTPT VISIT,PROCEDURE ONLY N/A 3/16/2018    SCC OF APEX OF SCALP radical removal of bone performed by Vikas Covarrubias MD at [address] OFFICE/OUTPT VISIT,PROCEDURE ONLY N/A 3/21/2018    MOHS REPAIR FOR SQUAMOUS CELL CARCINOMA OF APEX OF SCALP performed by Vikas Covarrubias MD at [address] OFFICE/OUTPT VISIT,PROCEDURE ONLY N/A 3/23/2018    CLOSURE OF MOHS OF THE SCALP performed by Vikas Covarrubias MD at [facility]         Social History:    Social History     Tobacco Use    Smoking status: Never Smoker    Smokeless tobacco: Never Used   Substance Use Topics    Alcohol use: Yes     Comment: occasional                                Counseling given: Not Answered      Vital Signs (Current):   Vitals:    04/22/22 1321 04/29/22 1037   BP:  (!) 160/70   Pulse:  66   Resp:  16   Temp:  96.6 °F (35.9 °C)   TempSrc:  Temporal   SpO2:  98%   Weight: 160 lb (72.6 kg) 166 lb 12.8 oz (75.7 kg)   Height: 5' 9\" (1.753 m) 5' 8\" (1.727 m)                                              BP Readings from Last 3 Encounters:   04/29/22 (!) 160/70   02/01/22 126/82   02/11/21 136/60       NPO Status: Time of last liquid consumption: 1900                        Time of last solid consumption: 1900                        Date of last liquid consumption: 04/28/22                        Date of last solid food consumption: 04/28/22    BMI:   Wt Readings from Last 3 Encounters:   04/29/22 166 lb 12.8 oz (75.7 kg)   02/01/22 165 lb (74.8 kg)   02/08/21 161 lb 3.2 oz (73.1 kg)     Body mass index is 25.36 kg/m². CBC:   Lab Results   Component Value Date    WBC 7.9 04/07/2022    RBC 3.81 04/07/2022    HGB 12.8 04/07/2022    HCT 39.5 04/07/2022    .7 04/07/2022    RDW 13.9 04/26/2018     04/07/2022       CMP:   Lab Results   Component Value Date     04/07/2022    K 4.5 04/07/2022    K 4.1 01/18/2021     04/07/2022    CO2 26 04/07/2022    BUN 20 04/07/2022    CREATININE 0.9 04/07/2022    LABGLOM 81 04/07/2022    GLUCOSE 90 04/07/2022    PROT 5.9 01/23/2021    CALCIUM 9.5 04/07/2022    BILITOT 0.7 01/23/2021    ALKPHOS 90 01/23/2021    AST 25 01/23/2021    ALT 18 01/23/2021       POC Tests: No results for input(s): POCGLU, POCNA, POCK, POCCL, POCBUN, POCHEMO, POCHCT in the last 72 hours. Coags:   Lab Results   Component Value Date    INR 1.12 01/17/2021    APTT 20.5 01/17/2021       HCG (If Applicable): No results found for: PREGTESTUR, PREGSERUM, HCG, HCGQUANT     ABGs: No results found for: PHART, PO2ART, WQX4BHG, ZSM9NPL, BEART, Q4FLCUUI     Type & Screen (If Applicable):  Lab Results   Component Value Date    LABRH POS 01/17/2021       Drug/Infectious Status (If Applicable):  No results found for: HIV, HEPCAB    COVID-19 Screening (If Applicable):   Lab Results   Component Value Date    COVID19 DETECTED 01/17/2021           Anesthesia Evaluation   no history of anesthetic complications:   Airway: Mallampati: II        Dental:          Pulmonary:normal exam              Patient did not smoke on day of surgery.                  Cardiovascular:    (+) hypertension:,

## 2022-05-02 NOTE — H&P
[facility]  History and Physical Update    Pt Name: Regulo Martin  MRN: [id number]  YOB: 1940  Date of evaluation: 5/2/2022    I have examined the patient and reviewed the H&P/Consult and there are no changes to the patient or plans.       Chtira Manjarrez MD  Electronically signed 5/2/2022 at 7:04 AM

## 2022-11-13 NOTE — CARE COORDINATION
1/26/21, 12:43 PM EST    DISCHARGE PLANNING EVALUATION    Order received due to TCU denial and possible new ecf in 1 to 2 days. Wife is in room 2 with Covid. SW spoke with pt due to wife have some new confusion this admission. Pt states he was not aware of TCU being consulted and is not really wanting TCU or ECF. Pt did give sw names of [facility] for possible options for he and his wife. SW called both facilities and both are not accepting covid unless 10 or 11 days post pos covid test and no symptoms. SW updated pt, states he does not want to give a back up plan and states he and his wife will return home at discharge. Pt states he is moving around fine in his room and so is his wife. Pt knows about wife's confusion and states its because she is in the hospital and needs to get her home. Pt states he has 2 daughters and 1 son that live close and will stay with them when they get home. Pt states he would be agreeable to [facility]. Pt states he will  Speak with his children later today to get a arrangements in place for wife's possible discharge tomorrow. SW updated RN Marco Antonio Galaviz. None

## 2023-04-11 ENCOUNTER — HOSPITAL ENCOUNTER (OUTPATIENT)
Age: 83
Discharge: HOME | End: 2023-04-11
Payer: MEDICARE

## 2023-04-11 DIAGNOSIS — Z13.6 SCREENING FOR ISCHEMIC HEART DISEASE: ICD-10-CM

## 2023-04-11 DIAGNOSIS — I10 ESSENTIAL HYPERTENSION: ICD-10-CM

## 2023-04-11 LAB
ANION GAP SERPL CALC-SCNC: 9 MEQ/L (ref 8–16)
BASOPHILS ABSOLUTE: 0 THOU/MM3 (ref 0–0.1)
BASOPHILS NFR BLD AUTO: 0.4 %
BUN SERPL-MCNC: 24 MG/DL (ref 7–22)
CALCIUM SERPL-MCNC: 9.2 MG/DL (ref 8.5–10.5)
CHLORIDE SERPL-SCNC: 105 MEQ/L (ref 98–111)
CHOLEST SERPL-MCNC: 148 MG/DL (ref 100–199)
CO2 SERPL-SCNC: 26 MEQ/L (ref 23–33)
CREAT SERPL-MCNC: 0.9 MG/DL (ref 0.4–1.2)
DEPRECATED RDW RBC AUTO: 50.4 FL (ref 35–45)
EOSINOPHIL NFR BLD AUTO: 3.1 %
EOSINOPHILS ABSOLUTE: 0.2 THOU/MM3 (ref 0–0.4)
ERYTHROCYTE [DISTWIDTH] IN BLOOD BY AUTOMATED COUNT: 13.1 % (ref 11.5–14.5)
GFR SERPL CREATININE-BSD FRML MDRD: > 60 ML/MIN/1.73M2
GLUCOSE SERPL-MCNC: 87 MG/DL (ref 70–108)
HCT VFR BLD AUTO: 38.6 % (ref 42–52)
HDLC SERPL-MCNC: 66 MG/DL
HGB BLD-MCNC: 12.4 GM/DL (ref 14–18)
IMM GRANULOCYTES # BLD AUTO: 0.01 THOU/MM3 (ref 0–0.07)
IMM GRANULOCYTES NFR BLD AUTO: 0.1 %
LDLC SERPL CALC-MCNC: 65 MG/DL
LYMPHOCYTES ABSOLUTE: 2.9 THOU/MM3 (ref 1–4.8)
LYMPHOCYTES NFR BLD AUTO: 38.7 %
MCH RBC QN AUTO: 33.4 PG (ref 26–33)
MCHC RBC AUTO-ENTMCNC: 32.1 GM/DL (ref 32.2–35.5)
MCV RBC AUTO: 104 FL (ref 80–94)
MONOCYTES ABSOLUTE: 0.6 THOU/MM3 (ref 0.4–1.3)
MONOCYTES NFR BLD AUTO: 8.7 %
NEUTROPHILS NFR BLD AUTO: 49 %
NRBC BLD AUTO-RTO: 0 /100 WBC
PLATELET # BLD AUTO: 214 THOU/MM3 (ref 130–400)
PMV BLD AUTO: 10.4 FL (ref 9.4–12.4)
POTASSIUM SERPL-SCNC: 4.6 MEQ/L (ref 3.5–5.2)
RBC # BLD AUTO: 3.71 MILL/MM3 (ref 4.7–6.1)
SEGMENTED NEUTROPHILS ABSOLUTE COUNT: 3.6 THOU/MM3 (ref 1.8–7.7)
SODIUM SERPL-SCNC: 140 MEQ/L (ref 135–145)
TRIGL SERPL-MCNC: 85 MG/DL (ref 0–199)
WBC # BLD AUTO: 7.4 THOU/MM3 (ref 4.8–10.8)

## 2023-04-11 PROCEDURE — 80061 LIPID PANEL: CPT

## 2023-04-11 PROCEDURE — 36415 COLL VENOUS BLD VENIPUNCTURE: CPT

## 2023-04-11 PROCEDURE — 85025 COMPLETE CBC W/AUTO DIFF WBC: CPT

## 2023-04-11 PROCEDURE — 80048 BASIC METABOLIC PNL TOTAL CA: CPT

## 2024-04-21 PROBLEM — U07.1 ACUTE HYPOXEMIC RESPIRATORY FAILURE DUE TO COVID-19 (HCC): Status: RESOLVED | Noted: 2021-01-17 | Resolved: 2024-04-21

## 2024-04-21 PROBLEM — J96.01 ACUTE HYPOXEMIC RESPIRATORY FAILURE DUE TO COVID-19 (HCC): Status: RESOLVED | Noted: 2021-01-17 | Resolved: 2024-04-21

## 2024-05-03 ENCOUNTER — HOSPITAL ENCOUNTER (OUTPATIENT)
Age: 84
Discharge: HOME | End: 2024-05-03
Payer: MEDICARE

## 2024-05-03 DIAGNOSIS — Z13.6 SCREENING FOR ISCHEMIC HEART DISEASE: ICD-10-CM

## 2024-05-03 DIAGNOSIS — I10 ESSENTIAL HYPERTENSION: ICD-10-CM

## 2024-05-03 LAB
ANION GAP SERPL CALC-SCNC: 13 MEQ/L (ref 8–16)
BASOPHILS ABSOLUTE: 0 THOU/MM3 (ref 0–0.1)
BASOPHILS NFR BLD AUTO: 0.4 %
BUN SERPL-MCNC: 25 MG/DL (ref 7–22)
CALCIUM SERPL-MCNC: 9.5 MG/DL (ref 8.5–10.5)
CHLORIDE SERPL-SCNC: 102 MEQ/L (ref 98–111)
CHOLEST SERPL-MCNC: 134 MG/DL (ref 100–199)
CO2 SERPL-SCNC: 22 MEQ/L (ref 23–33)
CREAT SERPL-MCNC: 1.1 MG/DL (ref 0.4–1.2)
DEPRECATED RDW RBC AUTO: 48.8 FL (ref 35–45)
EOSINOPHIL NFR BLD AUTO: 2.5 %
EOSINOPHILS ABSOLUTE: 0.2 THOU/MM3 (ref 0–0.4)
ERYTHROCYTE [DISTWIDTH] IN BLOOD BY AUTOMATED COUNT: 12.9 % (ref 11.5–14.5)
GFR SERPL CREATININE-BSD FRML MDRD: 66 ML/MIN/1.73M2
GLUCOSE SERPL-MCNC: 86 MG/DL (ref 70–108)
HCT VFR BLD AUTO: 37.4 % (ref 42–52)
HDLC SERPL-MCNC: 61 MG/DL
HGB BLD-MCNC: 12.5 GM/DL (ref 14–18)
IMM GRANULOCYTES # BLD AUTO: 0.01 THOU/MM3 (ref 0–0.07)
IMM GRANULOCYTES NFR BLD AUTO: 0.1 %
LDLC SERPL CALC-MCNC: 60 MG/DL
LYMPHOCYTES ABSOLUTE: 3.6 THOU/MM3 (ref 1–4.8)
LYMPHOCYTES NFR BLD AUTO: 44.8 %
MCH RBC QN AUTO: 34.4 PG (ref 26–33)
MCHC RBC AUTO-ENTMCNC: 33.4 GM/DL (ref 32.2–35.5)
MCV RBC AUTO: 103 FL (ref 80–94)
MONOCYTES ABSOLUTE: 0.6 THOU/MM3 (ref 0.4–1.3)
MONOCYTES NFR BLD AUTO: 7.5 %
NEUTROPHILS ABSOLUTE: 3.6 THOU/MM3 (ref 1.8–7.7)
NRBC BLD AUTO-RTO: 0 /100 WBC
PLATELET # BLD AUTO: 174 THOU/MM3 (ref 130–400)
PMV BLD AUTO: 10.7 FL (ref 9.4–12.4)
POTASSIUM SERPL-SCNC: 4.5 MEQ/L (ref 3.5–5.2)
RBC # BLD AUTO: 3.63 MILL/MM3 (ref 4.7–6.1)
SODIUM SERPL-SCNC: 137 MEQ/L (ref 135–145)
TRIGL SERPL-MCNC: 67 MG/DL (ref 0–199)
WBC # BLD AUTO: 8.1 THOU/MM3 (ref 4.8–10.8)

## 2024-05-03 PROCEDURE — 80061 LIPID PANEL: CPT

## 2024-05-03 PROCEDURE — 36415 COLL VENOUS BLD VENIPUNCTURE: CPT

## 2024-05-03 PROCEDURE — 80048 BASIC METABOLIC PNL TOTAL CA: CPT

## 2024-05-03 PROCEDURE — 85025 COMPLETE CBC W/AUTO DIFF WBC: CPT

## 2025-06-04 ENCOUNTER — HOSPITAL ENCOUNTER (OUTPATIENT)
Age: 85
Discharge: HOME | End: 2025-06-04
Payer: MEDICARE

## 2025-06-04 DIAGNOSIS — Z13.6 SCREENING FOR ISCHEMIC HEART DISEASE: ICD-10-CM

## 2025-06-04 DIAGNOSIS — I10 ESSENTIAL HYPERTENSION: ICD-10-CM

## 2025-06-04 LAB
ANION GAP SERPL CALC-SCNC: 12 MEQ/L (ref 8–16)
BASOPHILS ABSOLUTE: 0 THOU/MM3 (ref 0–0.1)
BASOPHILS NFR BLD AUTO: 0.6 %
BUN SERPL-MCNC: 30 MG/DL (ref 8–23)
CALCIUM SERPL-MCNC: 9.4 MG/DL (ref 8.8–10.2)
CHLORIDE SERPL-SCNC: 104 MEQ/L (ref 98–111)
CHOLEST SERPL-MCNC: 143 MG/DL (ref 100–199)
CO2 SERPL-SCNC: 22 MEQ/L (ref 22–29)
CREAT SERPL-MCNC: 1.1 MG/DL (ref 0.7–1.2)
DEPRECATED RDW RBC AUTO: 52.7 FL (ref 35–45)
EOSINOPHIL NFR BLD AUTO: 5 %
EOSINOPHILS ABSOLUTE: 0.4 THOU/MM3 (ref 0–0.4)
ERYTHROCYTE [DISTWIDTH] IN BLOOD BY AUTOMATED COUNT: 13.8 % (ref 11.5–14.5)
GFR SERPL CREATININE-BSD FRML MDRD: 65 ML/MIN/1.73M2
GLUCOSE SERPL-MCNC: 81 MG/DL (ref 74–109)
HCT VFR BLD AUTO: 35.8 % (ref 42–52)
HDLC SERPL-MCNC: 71 MG/DL
HGB BLD-MCNC: 11.8 GM/DL (ref 14–18)
IMM GRANULOCYTES # BLD AUTO: 0.01 THOU/MM3 (ref 0–0.07)
IMM GRANULOCYTES NFR BLD AUTO: 0.1 %
LDLC SERPL CALC-MCNC: 59 MG/DL
LYMPHOCYTES ABSOLUTE: 3.5 THOU/MM3 (ref 1–4.8)
LYMPHOCYTES NFR BLD AUTO: 43.1 %
MCH RBC QN AUTO: 34.3 PG (ref 26–33)
MCHC RBC AUTO-ENTMCNC: 33 GM/DL (ref 32.2–35.5)
MCV RBC AUTO: 104.1 FL (ref 80–94)
MONOCYTES ABSOLUTE: 0.7 THOU/MM3 (ref 0.4–1.3)
MONOCYTES NFR BLD AUTO: 8 %
NEUTROPHILS ABSOLUTE: 3.5 THOU/MM3 (ref 1.8–7.7)
NEUTROPHILS NFR BLD AUTO: 43.2 %
NRBC BLD AUTO-RTO: 0 /100 WBC
PLATELET # BLD AUTO: 218 THOU/MM3 (ref 130–400)
PMV BLD AUTO: 10.6 FL (ref 9.4–12.4)
POTASSIUM SERPL-SCNC: 4.8 MEQ/L (ref 3.5–5.2)
RBC # BLD AUTO: 3.44 MILL/MM3 (ref 4.7–6.1)
SODIUM SERPL-SCNC: 138 MEQ/L (ref 135–145)
TRIGL SERPL-MCNC: 64 MG/DL (ref 0–199)
WBC # BLD AUTO: 8.2 THOU/MM3 (ref 4.8–10.8)

## 2025-06-04 PROCEDURE — 85025 COMPLETE CBC W/AUTO DIFF WBC: CPT

## 2025-06-04 PROCEDURE — 80061 LIPID PANEL: CPT

## 2025-06-04 PROCEDURE — 80048 BASIC METABOLIC PNL TOTAL CA: CPT

## 2025-06-04 PROCEDURE — 36415 COLL VENOUS BLD VENIPUNCTURE: CPT

## (undated) DEVICE — GLOVE SURG SZ 8 L11.77IN FNGR THK9.8MIL STRW LTX POLYMER

## (undated) DEVICE — DRESSING,GAUZE,XEROFORM,CURAD,5"X9",ST: Brand: CURAD

## (undated) DEVICE — CHLORAPREP 26ML CLEAR

## (undated) DEVICE — SPONGE GZ W4XL4IN COT 12 PLY TYP VII WVN C FLD DSGN

## (undated) DEVICE — GLOVE SURG SZ 65 THK91MIL LTX FREE SYN POLYISOPRENE

## (undated) DEVICE — SOLUTION IV 500ML 0.9% SOD CHL PH 5 INJ USP VIAFLX PLAS

## (undated) DEVICE — TUBING, SUCTION, 1/4" X 12', STRAIGHT: Brand: MEDLINE

## (undated) DEVICE — STERILE COTTON BALLS LARGE 5/P: Brand: MEDLINE

## (undated) DEVICE — INTENDED FOR TISSUE SEPARATION, AND OTHER PROCEDURES THAT REQUIRE A SHARP SURGICAL BLADE TO PUNCTURE OR CUT.: Brand: BARD-PARKER ® CARBON RIB-BACK BLADES

## (undated) DEVICE — PACK PROCEDURE SURG PLAS SC MIN SRHP LF

## (undated) DEVICE — STRIP,CLOSURE,WOUND,MEDI-STRIP,1/2X4: Brand: MEDLINE

## (undated) DEVICE — SOLUTION IV 1000ML 0.9% SOD CHL PH 5 INJ USP VIAFLX PLAS

## (undated) DEVICE — SUREFIT, DUAL DISPERSIVE ELECTRODE, CONTACT QUALITY MONITOR: Brand: SUREFIT

## (undated) DEVICE — GOWN,SIRUS,NON REINFRCD,LARGE,SET IN SL: Brand: MEDLINE

## (undated) DEVICE — GLOVE ORANGE PI 7 1/2   MSG9075

## (undated) DEVICE — Device

## (undated) DEVICE — SUTURE NONABSORBABLE BRAIDED 4-0 SH 30 IN BLK PERMA HND K831H

## (undated) DEVICE — BANDAGE COMPR M W4INXL10YD WHT BGE VELC E MTRX HK AND LOOP

## (undated) DEVICE — GLOVE ORANGE PI 7   MSG9070

## (undated) DEVICE — SYRINGE IRRIG 60ML SFT PLIABLE BLB EZ TO GRP 1 HND USE W/

## (undated) DEVICE — SPONGE LAP W18XL18IN WHT COT 4 PLY FLD STRUNG RADPQ DISP ST

## (undated) DEVICE — BANDAGE,GAUZE,4.5"X4.1YD,STERILE,LF: Brand: MEDLINE

## (undated) DEVICE — CONTAINER,SPECIMEN,PNEU TUBE,4OZ,OR STRL: Brand: MEDLINE

## (undated) DEVICE — GARMENT COMPR STD FOR 17IN CALF UNIF THER FLOTRN

## (undated) DEVICE — MARKER,SKIN,WI/RULER AND LABELS: Brand: MEDLINE

## (undated) DEVICE — COAGULATOR ELECSURG BLADE 10 FTX1 IN PTFE STRL ULTRACLEAN

## (undated) DEVICE — YANKAUER,BULB TIP,W/O VENT,RIGID,STERILE: Brand: MEDLINE

## (undated) DEVICE — GOWN,SIRUS,NONRNF,SETINSLV,XL,20/CS: Brand: MEDLINE

## (undated) DEVICE — TRAY PREP DRY W/ PREM GLV 2 APPL 6 SPNG 2 UNDPD 1 OVERWRAP

## (undated) DEVICE — 3M™ TRANSPORE™ WHITE SURGICAL TAPE 1534-2, 2 INCH X 10 YARD (5CM X 9,1M), 6 ROLLS/CARTON 10 CARTONS/CASE: Brand: 3M™ TRANSPORE™

## (undated) DEVICE — SUTURE MCRYL SZ 4-0 L18IN ABSRB UD P-3 L13MM 3/8 CIR PRIM Y494G

## (undated) DEVICE — PAD,ABDOMINAL,5"X9",ST,LF,25/BX: Brand: MEDLINE INDUSTRIES, INC.

## (undated) DEVICE — 3M™ STERI-STRIP™ COMPOUND BENZOIN TINCTURE 40 BAGS/CARTON 4 CARTONS/CASE C1544: Brand: 3M™ STERI-STRIP™

## (undated) DEVICE — GAUZE,SPONGE,8"X4",12PLY,XRAY,STRL,LF: Brand: MEDLINE

## (undated) DEVICE — COTTON BALL ST

## (undated) DEVICE — DERMATOME BLADES: Brand: DERMATOME